# Patient Record
Sex: FEMALE | Race: WHITE | NOT HISPANIC OR LATINO | Employment: OTHER | URBAN - METROPOLITAN AREA
[De-identification: names, ages, dates, MRNs, and addresses within clinical notes are randomized per-mention and may not be internally consistent; named-entity substitution may affect disease eponyms.]

---

## 2017-01-05 ENCOUNTER — OFFICE VISIT (OUTPATIENT)
Dept: PODIATRY | Facility: CLINIC | Age: 82
End: 2017-01-05
Payer: COMMERCIAL

## 2017-01-05 DIAGNOSIS — L60.2 ONYCHAUXIS: Primary | ICD-10-CM

## 2017-01-05 PROCEDURE — 17999 UNLISTD PX SKN MUC MEMB SUBQ: CPT | Mod: CSM,,,

## 2017-01-05 PROCEDURE — 99499 UNLISTED E&M SERVICE: CPT | Mod: CSM,,,

## 2017-01-05 PROCEDURE — 99999 PR PBB SHADOW E&M-EST. PATIENT-LVL II: CPT | Mod: PBBFAC,,,

## 2017-01-05 NOTE — MR AVS SNAPSHOT
[facility]  [address] [facility]  [facility] [address]  Phone: [phone]                  Judy Thompson   2017 11:00 AM   Office Visit    Description:  Female : 1933   Provider:  Braeden Vazquez DPM   [facility]           Reason for Visit     Routine Foot Care                To Do List           Future Appointments        Provider Department Dept Phone    2017 10:00 AM DIANA Quinn - Outpatient Rehab [phone]    2017 11:00 AM LAB, [place] [facility] [phone]    2017 1:40 PM Kristopher Santizo MD [facility] Hematology [phone]    2017 2:00 PM [address]AIR [address] [facility] CHE[facility] [phone]    3/9/2017 11:15 AM [facility] [phone]      Goals (5 Years of Data)     None      [facility]     [facility] Nurse Care Line -  Assistance  Registered nurses in the [facility] provide clinical advisement, health education, appointment booking, and other advisory services.  Call for this free service at [phone].             Medications           Message regarding Medications     Verify the changes and/or additions to your medication regime listed below are the same as discussed with your clinician today.  If any of these changes or additions are incorrect, please notify your healthcare provider.             Verify that the below list of medications is an accurate representation of the medications you are currently taking.  If none reported, the list may be blank. If incorrect, please contact your healthcare provider. Carry this list with you in case of emergency.           Current Medications     ASCORBATE CALCIUM (VITAMIN C ORAL) Take 1 tablet by mouth.    aspirin 81 mg Tab Take 1 tablet by mouth Daily.    benazepril (LOTENSIN) 40 MG tablet TAKE 1 TABLET BY MOUTH EVERY DAY    calcium carbonate-vit D3-min (CALCIUM 600 +  MINERALS) 600 mg calcium- 400 unit Tab Take 1 tablet by mouth once daily.    cholecalciferol, vitamin D3, (VITAMIN D3) 400 unit Chew Take by mouth.    DENOSUMAB (PROLIA SUBQ) Inject into the skin. Every 6 months    diazePAM (VALIUM) 5 MG tablet Take 1 tablet (5 mg total) by mouth 2 (two) times daily as needed.    DUEXIS 800-26.6 mg Tab Take 800 mg by mouth 3 (three) times daily.     FSH/FLX/PRM/BLK/BOR/OM3,6,9 #5 (OMEGA 3-6-9 FATTY ACIDS ORAL) Take 1 capsule by mouth.    glucosamine-chondroitin 500-400 mg tablet Take 1 tablet by mouth once daily.    multivitamin (THERAGRAN) per tablet Take 1 tablet by mouth Daily.    tavaborole (KERYDIN) 5 % Karuna Apply 1 application topically once daily.           Clinical Reference Information           Allergies as of 1/5/2017     Codeine      Immunizations Administered on Date of Encounter - 1/5/2017     None

## 2017-01-07 NOTE — PROGRESS NOTES
Pt presents for routine non-covered foot care. Pt. does not have high risk feet. Pedal pulses are palpable. Nails are elongated, thickened Bilaterally. Diagnosis is onychauxis. Nails were reduced Bilaterally. Patient tolerated well and related relief. RTC p.r.n. as PROC B

## 2017-01-10 RX ORDER — BENAZEPRIL HYDROCHLORIDE 40 MG/1
TABLET ORAL
Qty: 90 TABLET | Refills: 0 | Status: SHIPPED | OUTPATIENT
Start: 2017-01-10 | End: 2017-04-11 | Stop reason: SDUPTHER

## 2017-01-13 ENCOUNTER — CLINICAL SUPPORT (OUTPATIENT)
Dept: REHABILITATION | Facility: HOSPITAL | Age: 82
End: 2017-01-13
Attending: FAMILY MEDICINE
Payer: COMMERCIAL

## 2017-01-13 DIAGNOSIS — M81.0 OSTEOPOROSIS: ICD-10-CM

## 2017-01-13 DIAGNOSIS — Z91.81 RISK FOR FALLS: ICD-10-CM

## 2017-01-13 PROCEDURE — G8979 MOBILITY GOAL STATUS: HCPCS | Mod: CJ,PN

## 2017-01-13 PROCEDURE — G8978 MOBILITY CURRENT STATUS: HCPCS | Mod: CJ,PN

## 2017-01-13 PROCEDURE — 97112 NEUROMUSCULAR REEDUCATION: CPT | Mod: PN

## 2017-01-13 PROCEDURE — 97110 THERAPEUTIC EXERCISES: CPT | Mod: PN

## 2017-01-13 PROCEDURE — 97116 GAIT TRAINING THERAPY: CPT | Mod: PN
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
## 2017-01-18 ENCOUNTER — TELEPHONE (OUTPATIENT)
Dept: HEMATOLOGY/ONCOLOGY | Facility: CLINIC | Age: 82
End: 2017-01-18

## 2017-01-18 ENCOUNTER — CLINICAL SUPPORT (OUTPATIENT)
Dept: REHABILITATION | Facility: HOSPITAL | Age: 82
End: 2017-01-18
Attending: FAMILY MEDICINE
Payer: COMMERCIAL

## 2017-01-18 DIAGNOSIS — Z91.81 RISK FOR FALLS: ICD-10-CM

## 2017-01-18 DIAGNOSIS — M81.0 OSTEOPOROSIS: ICD-10-CM

## 2017-01-18 PROCEDURE — 97116 GAIT TRAINING THERAPY: CPT | Mod: PN

## 2017-01-18 PROCEDURE — 97112 NEUROMUSCULAR REEDUCATION: CPT | Mod: PN

## 2017-01-18 PROCEDURE — 97110 THERAPEUTIC EXERCISES: CPT | Mod: PN

## 2017-01-18 NOTE — TELEPHONE ENCOUNTER
----- Message from Julee Cordoba sent at 1/18/2017  2:51 PM CST -----  Contact: self: [phone]  Patient has a blood test on 1/20/17. She would like to change it to 1/21/17. Please call to reschedule.
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
## 2017-01-20 ENCOUNTER — TELEPHONE (OUTPATIENT)
Dept: HEMATOLOGY/ONCOLOGY | Facility: CLINIC | Age: 82
End: 2017-01-20

## 2017-01-20 NOTE — TELEPHONE ENCOUNTER
----- Message from Evon Esposito sent at 1/19/2017  2:42 PM CST -----  Pt called stated that she's been waiting on a call back regarding changing her lab appt to Saturday/pls call back at [phone]

## 2017-01-21 ENCOUNTER — LAB VISIT (OUTPATIENT)
Dept: LAB | Facility: HOSPITAL | Age: 82
End: 2017-01-21
Attending: INTERNAL MEDICINE
Payer: COMMERCIAL

## 2017-01-21 DIAGNOSIS — C50.911 BREAST CANCER, RIGHT: ICD-10-CM

## 2017-01-21 DIAGNOSIS — S72.001A HIP FRACTURE, RIGHT, CLOSED, INITIAL ENCOUNTER: ICD-10-CM

## 2017-01-21 DIAGNOSIS — M81.0 OSTEOPOROSIS, SENILE: ICD-10-CM

## 2017-01-21 LAB
ANION GAP SERPL CALC-SCNC: 13 MMOL/L
BUN SERPL-MCNC: 16 MG/DL
CALCIUM SERPL-MCNC: 9.4 MG/DL
CHLORIDE SERPL-SCNC: 101 MMOL/L
CO2 SERPL-SCNC: 23 MMOL/L
CREAT SERPL-MCNC: 0.7 MG/DL
EST. GFR  (AFRICAN AMERICAN): >60 ML/MIN/1.73 M^2
EST. GFR  (NON AFRICAN AMERICAN): >60 ML/MIN/1.73 M^2
GLUCOSE SERPL-MCNC: 97 MG/DL
POTASSIUM SERPL-SCNC: 4.6 MMOL/L
SODIUM SERPL-SCNC: 137 MMOL/L

## 2017-01-21 PROCEDURE — 80048 BASIC METABOLIC PNL TOTAL CA: CPT | Mod: PO

## 2017-01-21 PROCEDURE — 36415 COLL VENOUS BLD VENIPUNCTURE: CPT | Mod: PO

## 2017-01-23 ENCOUNTER — OFFICE VISIT (OUTPATIENT)
Dept: HEMATOLOGY/ONCOLOGY | Facility: CLINIC | Age: 82
End: 2017-01-23
Payer: COMMERCIAL

## 2017-01-23 ENCOUNTER — INFUSION (OUTPATIENT)
Dept: INFUSION THERAPY | Facility: HOSPITAL | Age: 82
End: 2017-01-23
Attending: INTERNAL MEDICINE
Payer: COMMERCIAL

## 2017-01-23 VITALS
WEIGHT: 138.69 LBS | DIASTOLIC BLOOD PRESSURE: 74 MMHG | HEART RATE: 61 BPM | SYSTOLIC BLOOD PRESSURE: 173 MMHG | HEIGHT: 63 IN | RESPIRATION RATE: 19 BRPM | BODY MASS INDEX: 24.57 KG/M2

## 2017-01-23 DIAGNOSIS — S72.001A HIP FRACTURE, RIGHT, CLOSED, INITIAL ENCOUNTER: ICD-10-CM

## 2017-01-23 DIAGNOSIS — C50.211 MALIGNANT NEOPLASM OF UPPER-INNER QUADRANT OF RIGHT FEMALE BREAST: ICD-10-CM

## 2017-01-23 DIAGNOSIS — M81.0 OSTEOPOROSIS: ICD-10-CM

## 2017-01-23 DIAGNOSIS — M81.0 OSTEOPOROSIS: Primary | ICD-10-CM

## 2017-01-23 DIAGNOSIS — M81.0 OSTEOPOROSIS, SENILE: Primary | ICD-10-CM

## 2017-01-23 PROCEDURE — 1157F ADVNC CARE PLAN IN RCRD: CPT | Mod: S$GLB,,, | Performed by: INTERNAL MEDICINE

## 2017-01-23 PROCEDURE — 1160F RVW MEDS BY RX/DR IN RCRD: CPT | Mod: S$GLB,,, | Performed by: INTERNAL MEDICINE

## 2017-01-23 PROCEDURE — 96372 THER/PROPH/DIAG INJ SC/IM: CPT | Mod: PN

## 2017-01-23 PROCEDURE — 99214 OFFICE O/P EST MOD 30 MIN: CPT | Mod: S$GLB,,, | Performed by: INTERNAL MEDICINE

## 2017-01-23 PROCEDURE — 1159F MED LIST DOCD IN RCRD: CPT | Mod: S$GLB,,, | Performed by: INTERNAL MEDICINE

## 2017-01-23 PROCEDURE — 63600175 PHARM REV CODE 636 W HCPCS: Mod: PN | Performed by: INTERNAL MEDICINE

## 2017-01-23 PROCEDURE — 3078F DIAST BP <80 MM HG: CPT | Mod: S$GLB,,, | Performed by: INTERNAL MEDICINE

## 2017-01-23 PROCEDURE — 1125F AMNT PAIN NOTED PAIN PRSNT: CPT | Mod: S$GLB,,, | Performed by: INTERNAL MEDICINE

## 2017-01-23 PROCEDURE — 99999 PR PBB SHADOW E&M-EST. PATIENT-LVL III: CPT | Mod: PBBFAC,,, | Performed by: INTERNAL MEDICINE

## 2017-01-23 PROCEDURE — 3077F SYST BP >= 140 MM HG: CPT | Mod: S$GLB,,, | Performed by: INTERNAL MEDICINE

## 2017-01-23 RX ADMIN — DENOSUMAB 60 MG: 60 INJECTION SUBCUTANEOUS at 02:01
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
## 2017-01-24 ENCOUNTER — CLINICAL SUPPORT (OUTPATIENT)
Dept: REHABILITATION | Facility: HOSPITAL | Age: 82
End: 2017-01-24
Attending: FAMILY MEDICINE
Payer: COMMERCIAL

## 2017-01-24 DIAGNOSIS — M81.0 OSTEOPOROSIS: ICD-10-CM

## 2017-01-24 DIAGNOSIS — Z91.81 RISK FOR FALLS: ICD-10-CM

## 2017-01-24 PROCEDURE — 97112 NEUROMUSCULAR REEDUCATION: CPT | Mod: PN

## 2017-01-24 PROCEDURE — 97110 THERAPEUTIC EXERCISES: CPT | Mod: PN

## 2017-01-24 PROCEDURE — 97116 GAIT TRAINING THERAPY: CPT | Mod: PN
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
## 2017-01-26 ENCOUNTER — CLINICAL SUPPORT (OUTPATIENT)
Dept: REHABILITATION | Facility: HOSPITAL | Age: 82
End: 2017-01-26
Attending: FAMILY MEDICINE
Payer: COMMERCIAL

## 2017-01-26 DIAGNOSIS — M81.0 OSTEOPOROSIS: ICD-10-CM

## 2017-01-26 DIAGNOSIS — Z91.81 RISK FOR FALLS: ICD-10-CM

## 2017-01-26 PROCEDURE — 97110 THERAPEUTIC EXERCISES: CPT | Mod: PN

## 2017-01-26 PROCEDURE — 97112 NEUROMUSCULAR REEDUCATION: CPT | Mod: PN

## 2017-01-26 PROCEDURE — 97116 GAIT TRAINING THERAPY: CPT | Mod: PN

## 2017-01-26 NOTE — PROGRESS NOTES
"Name: Judy Thompson  Clinic Number: [id number]  Date of Treatment: 01/26/2017   Diagnosis:   Encounter Diagnoses   Name Primary?    Risk for falls     Osteoporosis        Visit number: 10  Start date: 12/8/16  POC End date: 2/2/17    Time in: 10:58  Time out: 11:50  Tx time: 50 min     Precautions: HTN, decreased balance, R ANKIT in 2015 in [place]    Subjective:    Patient reports improvement of pain in her R low back since falling onto the step. Rates her R knee pain to be 3/10 on a 0-10 scale with 0 being no pain and 10 being the worst pain imaginable.     Objective     Judy instructed in and performed therapeutic exercises to develop strength, endurance, flexibility, posture and core stabilization for 20 minutes including:   Rec bike (seat 4) level 2 x5 min  Seated HS stretch 2 x 60" each  Calf stretch on slant board x 2'   Calf raises 3 x 10   Standing hip abd 2 x 10 B   Sit to stand with BIG movement x 10   Up and down steps x 2    Neuromuscular Re-education for improved balance, coordination of LEs, postural stability with narrowed DEEDEE  X 15 min   Standing balance: SLS x 10" x 3 trials each with UE support, modified tandem stance without UE assist 3x10 sec  PNF lifts with Red tball from mat to shelf x 10 each   1/4 turns over objects x 10 B directions with UE support as needed.     Gait tx for improved stability with direction changes and obstacle negotiations: X 15 min   Dynamic gait activities: head turns, obstacle negotiations (especially step overs with minimal UE assist), direction changes, speed changes in and out of parallel bars with varying assist from UEs     Written Home Exercises Provided: to continue with current written HEP.  Pt demonstrated good understanding of education provided with good return demonstration of exercises.    Assessment:     Judy tolerated treatment session well this date. She continues with hesitation to perform step over activities without use of UEs due to fear of falling. " Improved tolerance to sit-stand with BIG movements however remains limited due to R knee pain. Improved foot clearance with cone step overs. Required several verbal cues for increased step height with several activities. She required UE use during gait and functional activities due to mild balance deficits and LE weakness.   Pt will continue to benefit from skilled PT intervention. Medical Necessity is demonstrated by:  Fall Risk, Pain limits function of effected part for some activities, Unable to participate fully in daily activities, Requires skilled supervision to complete and progress HEP and Weakness.    Patient is making good progress towards established goals.    GOALS:   Short term goals: 4 weeks, pt agrees to goals set.  1. Increased Read score by 2-3 points for increased safety and balance for daily activities  2. Pt will ambulate on uneven surface with or without SC Mod I  3. Pt will ascend/descend 20 steps with 1 rail mod I  4. Pt will tolerate 45 minutes of physical therapy treatment with 1 rest break to demonstrate overall improvement in CV endurance  5. Pt will increase B LE strength by 1/3 muscle grade in all deficient planes for increased ease with stairs.     Long term goals: 8 weeks, pt agrees to goals set  1. Pt will ambulate 15-20 ft on grass with/without SC mod I  2. Pt will be independent with HEP.  3. Pt will increase B LE strength by 1/3 muscle grade in all deficient planes for increased ease with stairs.  4. Pt will be able to perform > or = 10 chair rises without UE support in order to demonstrate improved LE strength and endurance.    Plan:  Continue with established Plan of Care towards PT goals. Continue to progress balance challenges and gait train on unlevel surfaces.     Safia Rodriguez

## 2017-02-01 ENCOUNTER — CLINICAL SUPPORT (OUTPATIENT)
Dept: REHABILITATION | Facility: HOSPITAL | Age: 82
End: 2017-02-01
Attending: FAMILY MEDICINE
Payer: COMMERCIAL

## 2017-02-01 DIAGNOSIS — Z91.81 RISK FOR FALLS: Primary | ICD-10-CM

## 2017-02-01 DIAGNOSIS — M81.0 OSTEOPOROSIS: ICD-10-CM

## 2017-02-01 PROCEDURE — G8979 MOBILITY GOAL STATUS: HCPCS | Mod: CJ,PN

## 2017-02-01 PROCEDURE — G8978 MOBILITY CURRENT STATUS: HCPCS | Mod: CJ,PN

## 2017-02-01 PROCEDURE — 97112 NEUROMUSCULAR REEDUCATION: CPT | Mod: PN

## 2017-02-01 PROCEDURE — 97110 THERAPEUTIC EXERCISES: CPT | Mod: PN

## 2017-02-01 PROCEDURE — 97116 GAIT TRAINING THERAPY: CPT | Mod: PN

## 2017-02-01 NOTE — PLAN OF CARE
Name: Judy Thompson  Clinic Number: [id number]  Date of Treatment: 02/01/2017   Diagnosis:   Encounter Diagnoses   Name Primary?    Risk for falls Yes    Osteoporosis        Visit number: 11  Start date: 12/8/16  POC End date: 2/2/17  Updated Plan of Care: 2/2/17     Precautions: HTN, decreased balance, R ANKIT in 2015 in [place]    Subjective:    Patient reports improvement of pain in her R low back since falling onto the step. Rates her R knee pain to be 3/10 on a 0-10 scale with 0 being no pain and 10 being the worst pain imaginable.     Objective     Hip abduction 4-/5  lena     Name: Judy Thompson, Clinic# [id number], 1/13/17      1. Gait level surfaces:  3 Normal Walks 20' w/no A.D., good speed, no evidence for imbalance,normal gait pattern   2 Mild Impairment Walks 20', uses A.D., slower speed, mild gait deviation   1 Moderate Impairment Walks 20', slow speed, abdnormal gait pattern, evidence of imbalance   0 Severe Impairment Cannot walk 20' without A.D., severe gait deviations or imbalance       Score:_2_____      2. Change in Gait Speed:  3 Normal Able to smoothly change walking speed without loss of balance or gait deviation. Shows a significant difference in walking speeds between normal, fast and slow speeds.   2 Mild Impairment Is able to change speed but demonstrates mild gait deviations or no gait deviations but unable to achieve a significant change in velocity or uses and A.D.   1 Moderate Impairment Makes only minor adjustments in walking speed or accomplishes a change in speed with significant gait deviations or changes speed but loses significant gait deviations or changes speed but loses balance but is able to recover and continue walking   0 Severe Impairment Cannot change speeds or loses balance and has to reach for walls or be caught       Score:_2____      3: Gait with Horizontal Head Turns:  3 Normal Performs Head turns smoothly with no change in gait.   2 Mild Impairment Performs head turns  smoothly with slight change in gait velocity, i.e minor disruptions to smooth gait path or uses a walking aid.    1 Moderate Impairment Performs head turns with moderate changes in gait velocity, slows down, staggers but recovers, can continue to walk.   0 Severe Impairment Performs task with severe disruption of gait , ie. Staggers outside 15 degree path, loses balance, stops reaches for wall.        Score:_3____      4. Gait with vertical Head turns:   3 Normal  Performs head turns with no change in gait   2 Mild impairment Performs task with slight change in gait velocity ie. Minor disruption in smooth gait path or uses walking aid.   1  Moderate impairment Performs task with moderate change in gait velocity, slows down, staggers but recovers, can continue to walk   0 Severe impairment Performs task with severe disruption of gait ie.staggers outside 15 degree path loses balance, stops reaches for wall       Score:__3_____          5. Gait and pivot turn:   3 Normal Pivot turns safely within 3 seconds and stops quickly with no loss of balance.   2 Mild Impairment Pivot turns safely in >3 seconds and stops quickly with no loss of balance.   1 Moderate Impairment Turns slowly, requires verbal cuing, requires several small steps to catch balance following turn and stop.   0 Severe Impairment Cannot turn safely, requires assistance to turn and stop.       Score:_2_____      6. Step over Obstacle:  3 Normal Is able to step over box without changing gait speed, no evidence of imbalance   2  Mild Impairment Is able to step over box but must slow down and adjust steps to clear box safely   1 Moderate Impairment Is able to step over box but must stop, then step over. May require verbal cueing.   0 Severe Impairment Unable to clear objects, walks into one or both, or requires physical assistance.       Score: _2______      7. Step Around Obstacles:   3  Normal Is able to walk around objects safely without changing gait  "speed, no evidence of imbalance   2 Mild impairment Is able to step around objects but must slow down and adjust steps to clear cones.   1 Moderate Impairment Is able to clear objects but must significantly slow speed to accomplish task or requires verbal cues.   0 Severe Impairment Unable to clear objects, walks into one or both or requires physical assistance.       Score:__2_____      8. Steps:   3 Normal Alternating feet, no rail   2 Mild Impairment Alternating feet, must use rail   1 Moderate Impiarment Two feet to a stair, must use rail   0 Severe impairment Cannot do safely.       Score:__2_____          TOTAL SCORE:_18/24__(20<40% impaired)_____  Goal: 19/24 (20<40% impaired)      Scoring/Attaching Appropriate Modifiers      Test score Modifier Impairment Limitation Restriction   24/24 CH 0 percent impaired, limited or restricted   20-23/24 CI @ least 1% but <20% impaired limited or restricted   15-19/24 CJ @ least 20% < 40% impaired, limited or restricted   10-14/24 CK @ least 40% < 60% impaired, limited or restricted   5-9/24 CL @ least 60% < 80% impaired, limited or restricted   1-4/24 CM @ least 80% < 100% impaired, limited or restricted   0/24 % impaired, limited or restricted              Judy instructed in and performed therapeutic exercises to develop strength, endurance, flexibility, posture and core stabilization for 30 minutes including:   Rec bike (seat 4) level 2 x5 min  Seated HS stretch 2 x 60" each  Calf stretch on slant board x 2'   Calf raises 3 x 10   Standing hip abd 2 x 10 B x10 with 2#   Up and down steps x 2    Neuromuscular Re-education for improved balance, coordination of LEs, postural stability with narrowed DEEDEE  X 15 min   Standing balance: SLS x 10" x 3 trials each with UE support, modified tandem stance without UE assist 3x10 sec  PNF lifts with Red tball from mat to shelf x 10 each   1/4 turns over objects x 10 B directions with UE support as needed.     Gait tx for " improved stability with direction changes and obstacle negotiations: X 15 min   Dynamic gait activities: head turns, obstacle negotiations (especially step overs with minimal UE assist), direction changes, speed changes in and out of parallel bars with varying assist from UEs     Written Home Exercises Provided: to continue with current written HEP.  Pt demonstrated good understanding of education provided with good return demonstration of exercises.    Assessment:     Judy tolerated treatment session well this date. She continues with hesitation to perform step over activities without use of UEs due to fear of falling. Improved tolerance to sit-stand with BIG movements however remains limited due to R knee pain. Improved foot clearance with cone step overs. Required several verbal cues for increased step height with several activities. She required UE use during gait and functional activities due to mild balance deficits and LE weakness.   Pt will continue to benefit from skilled PT intervention. Medical Necessity is demonstrated by:  Fall Risk, Pain limits function of effected part for some activities, Unable to participate fully in daily activities, Requires skilled supervision to complete and progress HEP and Weakness.    Patient is making good progress towards established goals.    GOALS:   Short term goals: 4 weeks, pt agrees to goals set.  1. Increased Read score by 2-3 points for increased safety and balance for daily activities  2. Pt will ambulate on uneven surface with or without SC Mod I  3. Pt will ascend/descend 20 steps with 1 rail mod I ~MET  4. Pt will tolerate 45 minutes of physical therapy treatment with 1 rest break to demonstrate overall improvement in CV endurance  5. Pt will increase B LE strength by 1/3 muscle grade in all deficient planes for increased ease with stairs.     Long term goals: 8 weeks, pt agrees to goals set  1. Pt will ambulate 15-20 ft on grass with/without SC mod I ~SBA on  2/1/17  2. Pt will be independent with HEP. ~progessing 2/1/17   3. Pt will increase B LE strength by 1/3 muscle grade in all deficient planes for increased ease with stairs.  4. Pt will be able to perform > or = 10 chair rises without UE support in order to demonstrate improved LE strength and endurance.  5. Dynamic Gait index > 20/34 for decreased fall risk     Plan:  Update PT plan of care 2 x week for 4 weeks as of 2/2/17 progressing towards current goals       Eladio Sánchez        I certify the need for these services furnished under this plan of treatment and while under my care.  ____________________________________ Physician/Referring Practitioner   Date of Signature

## 2017-02-01 NOTE — PROGRESS NOTES
Name: Judy Thompson  Clinic Number: [id number]  Date of Treatment: 02/01/2017   Diagnosis:   Encounter Diagnoses   Name Primary?    Risk for falls Yes    Osteoporosis        Visit number: 11  Start date: 12/8/16  POC End date: 2/2/17  Updated Plan of Care: 2/2/17     Precautions: HTN, decreased balance, R ANKIT in 2015 in [place]    Subjective:    Patient reports improvement of pain in her R low back since falling onto the step. Rates her R knee pain to be 3/10 on a 0-10 scale with 0 being no pain and 10 being the worst pain imaginable.     Objective     Hip abduction 4-/5  lena     Name: Judy Thompson, Clinic# [id number], 1/13/17      1. Gait level surfaces:  3 Normal Walks 20' w/no A.D., good speed, no evidence for imbalance,normal gait pattern   2 Mild Impairment Walks 20', uses A.D., slower speed, mild gait deviation   1 Moderate Impairment Walks 20', slow speed, abdnormal gait pattern, evidence of imbalance   0 Severe Impairment Cannot walk 20' without A.D., severe gait deviations or imbalance       Score:_2_____      2. Change in Gait Speed:  3 Normal Able to smoothly change walking speed without loss of balance or gait deviation. Shows a significant difference in walking speeds between normal, fast and slow speeds.   2 Mild Impairment Is able to change speed but demonstrates mild gait deviations or no gait deviations but unable to achieve a significant change in velocity or uses and A.D.   1 Moderate Impairment Makes only minor adjustments in walking speed or accomplishes a change in speed with significant gait deviations or changes speed but loses significant gait deviations or changes speed but loses balance but is able to recover and continue walking   0 Severe Impairment Cannot change speeds or loses balance and has to reach for walls or be caught       Score:_2____      3: Gait with Horizontal Head Turns:  3 Normal Performs Head turns smoothly with no change in gait.   2 Mild Impairment Performs head turns  smoothly with slight change in gait velocity, i.e minor disruptions to smooth gait path or uses a walking aid.    1 Moderate Impairment Performs head turns with moderate changes in gait velocity, slows down, staggers but recovers, can continue to walk.   0 Severe Impairment Performs task with severe disruption of gait , ie. Staggers outside 15 degree path, loses balance, stops reaches for wall.        Score:_3____      4. Gait with vertical Head turns:   3 Normal  Performs head turns with no change in gait   2 Mild impairment Performs task with slight change in gait velocity ie. Minor disruption in smooth gait path or uses walking aid.   1  Moderate impairment Performs task with moderate change in gait velocity, slows down, staggers but recovers, can continue to walk   0 Severe impairment Performs task with severe disruption of gait ie.staggers outside 15 degree path loses balance, stops reaches for wall       Score:__3_____          5. Gait and pivot turn:   3 Normal Pivot turns safely within 3 seconds and stops quickly with no loss of balance.   2 Mild Impairment Pivot turns safely in >3 seconds and stops quickly with no loss of balance.   1 Moderate Impairment Turns slowly, requires verbal cuing, requires several small steps to catch balance following turn and stop.   0 Severe Impairment Cannot turn safely, requires assistance to turn and stop.       Score:_2_____      6. Step over Obstacle:  3 Normal Is able to step over box without changing gait speed, no evidence of imbalance   2  Mild Impairment Is able to step over box but must slow down and adjust steps to clear box safely   1 Moderate Impairment Is able to step over box but must stop, then step over. May require verbal cueing.   0 Severe Impairment Unable to clear objects, walks into one or both, or requires physical assistance.       Score: _2______      7. Step Around Obstacles:   3  Normal Is able to walk around objects safely without changing gait  "speed, no evidence of imbalance   2 Mild impairment Is able to step around objects but must slow down and adjust steps to clear cones.   1 Moderate Impairment Is able to clear objects but must significantly slow speed to accomplish task or requires verbal cues.   0 Severe Impairment Unable to clear objects, walks into one or both or requires physical assistance.       Score:__2_____      8. Steps:   3 Normal Alternating feet, no rail   2 Mild Impairment Alternating feet, must use rail   1 Moderate Impiarment Two feet to a stair, must use rail   0 Severe impairment Cannot do safely.       Score:__2_____          TOTAL SCORE:_18/24__(20<40% impaired)_____  Goal: 19/24 (20<40% impaired)      Scoring/Attaching Appropriate Modifiers      Test score Modifier Impairment Limitation Restriction   24/24 CH 0 percent impaired, limited or restricted   20-23/24 CI @ least 1% but <20% impaired limited or restricted   15-19/24 CJ @ least 20% < 40% impaired, limited or restricted   10-14/24 CK @ least 40% < 60% impaired, limited or restricted   5-9/24 CL @ least 60% < 80% impaired, limited or restricted   1-4/24 CM @ least 80% < 100% impaired, limited or restricted   0/24 % impaired, limited or restricted              Judy instructed in and performed therapeutic exercises to develop strength, endurance, flexibility, posture and core stabilization for 30 minutes including:   Rec bike (seat 4) level 2 x5 min  Seated HS stretch 2 x 60" each  Calf stretch on slant board x 2'   Calf raises 3 x 10   Standing hip abd 2 x 10 B x10 with 2#   Up and down steps x 2    Neuromuscular Re-education for improved balance, coordination of LEs, postural stability with narrowed DEEDEE  X 15 min   Standing balance: SLS x 10" x 3 trials each with UE support, modified tandem stance without UE assist 3x10 sec  PNF lifts with Red tball from mat to shelf x 10 each   1/4 turns over objects x 10 B directions with UE support as needed.     Gait tx for " improved stability with direction changes and obstacle negotiations: X 15 min   Dynamic gait activities: head turns, obstacle negotiations (especially step overs with minimal UE assist), direction changes, speed changes in and out of parallel bars with varying assist from UEs     Written Home Exercises Provided: to continue with current written HEP.  Pt demonstrated good understanding of education provided with good return demonstration of exercises.    Assessment:     Judy tolerated treatment session well this date. She continues with hesitation to perform step over activities without use of UEs due to fear of falling. Improved tolerance to sit-stand with BIG movements however remains limited due to R knee pain. Improved foot clearance with cone step overs. Required several verbal cues for increased step height with several activities. She required UE use during gait and functional activities due to mild balance deficits and LE weakness.   Pt will continue to benefit from skilled PT intervention. Medical Necessity is demonstrated by:  Fall Risk, Pain limits function of effected part for some activities, Unable to participate fully in daily activities, Requires skilled supervision to complete and progress HEP and Weakness.    Patient is making good progress towards established goals.    GOALS:   Short term goals: 4 weeks, pt agrees to goals set.  1. Increased Read score by 2-3 points for increased safety and balance for daily activities  2. Pt will ambulate on uneven surface with or without SC Mod I  3. Pt will ascend/descend 20 steps with 1 rail mod I ~MET  4. Pt will tolerate 45 minutes of physical therapy treatment with 1 rest break to demonstrate overall improvement in CV endurance  5. Pt will increase B LE strength by 1/3 muscle grade in all deficient planes for increased ease with stairs.     Long term goals: 8 weeks, pt agrees to goals set  1. Pt will ambulate 15-20 ft on grass with/without SC mod I ~SBA on  2/1/17  2. Pt will be independent with HEP. ~progessing 2/1/17   3. Pt will increase B LE strength by 1/3 muscle grade in all deficient planes for increased ease with stairs.  4. Pt will be able to perform > or = 10 chair rises without UE support in order to demonstrate improved LE strength and endurance.  5. Dynamic Gait index > 20/34 for decreased fall risk     Plan:  Update PT plan of care 2 x week for 4 weeks as of 2/2/17 progressing towards current goals       Eladio Sánchez        I certify the need for these services furnished under this plan of treatment and while under my care.  ____________________________________ Physician/Referring Practitioner   Date of Signature

## 2017-02-03 ENCOUNTER — CLINICAL SUPPORT (OUTPATIENT)
Dept: REHABILITATION | Facility: HOSPITAL | Age: 82
End: 2017-02-03
Attending: FAMILY MEDICINE
Payer: COMMERCIAL

## 2017-02-03 DIAGNOSIS — Z91.81 RISK FOR FALLS: ICD-10-CM

## 2017-02-03 DIAGNOSIS — M81.0 OSTEOPOROSIS: ICD-10-CM

## 2017-02-03 PROCEDURE — 97116 GAIT TRAINING THERAPY: CPT | Mod: PN

## 2017-02-03 PROCEDURE — 97110 THERAPEUTIC EXERCISES: CPT | Mod: PN

## 2017-02-03 NOTE — PROGRESS NOTES
"Name: Judy Thompson  Clinic Number: [id number]  Date of Treatment: 02/03/2017   Diagnosis:   Encounter Diagnoses   Name Primary?    Risk for falls     Osteoporosis        Visit number: 12  Start date: 12/8/16  POC End date: 2/2/17  Updated Plan of Care: 2/2/17     Start time: 11:07  End time: 12:03  Total Treatment Time: 55  Group time: 25    Precautions: HTN, decreased balance, R ANKIT in 2015 in [place]    Subjective:    Patient reports improvement of pain in her R low back since falling onto the step. Rates her R knee pain to be 3/10 on a 0-10 scale with 0 being no pain and 10 being the worst pain imaginable.     Objective     Judy instructed in and performed therapeutic exercises to develop strength, endurance, flexibility, posture and core stabilization for 30 minutes including:   Rec bike (seat 4) level 2 x5 min  Seated HS stretch 2 x 60" each  Calf stretch on slant board x 2'   Calf raises 3 x 10   Standing hip abd 2 x 10 B x10 with 2#   Up and down steps x 2 (NP)    Neuromuscular Re-education for improved balance, coordination of LEs, postural stability with narrowed DEEDEE  X 5 min   Standing balance: SLS x 10" x 3 trials each with UE support, modified tandem stance without UE assist 3x10 sec  PNF lifts with Red tball from mat to shelf x 10 each (NP)  1/4 turns over objects x 10 B directions with UE support as needed. (NP)    Gait tx for improved stability with direction changes and obstacle negotiations: X 20 min   Dynamic gait activities: head turns, obstacle negotiations (especially step overs with minimal UE assist), direction changes, speed changes in and out of parallel bars with varying assist from UEs     Written Home Exercises Provided: to continue with current written HEP.  Pt demonstrated good understanding of education provided with good return demonstration of exercises.    Assessment:     Improved step clearance during ambulation with decreased use of UE for gait activities in // bars. Judy tolerated " treatment session well overall yet reported mild increase of R knee fatigue at end of session. She continues with mild hesitation to avoid use of UEs due to fear of falling. Tandem balance worse with L LE in front.   Pt will continue to benefit from skilled PT intervention. Medical Necessity is demonstrated by:  Fall Risk, Pain limits function of effected part for some activities, Unable to participate fully in daily activities, Requires skilled supervision to complete and progress HEP and Weakness.    Patient is making good progress towards established goals.    GOALS:   Short term goals: 4 weeks, pt agrees to goals set.  1. Increased Reda score by 2-3 points for increased safety and balance for daily activities  2. Pt will ambulate on uneven surface with or without SC Mod I  3. Pt will ascend/descend 20 steps with 1 rail mod I ~MET  4. Pt will tolerate 45 minutes of physical therapy treatment with 1 rest break to demonstrate overall improvement in CV endurance  5. Pt will increase B LE strength by 1/3 muscle grade in all deficient planes for increased ease with stairs.     Long term goals: 8 weeks, pt agrees to goals set  1. Pt will ambulate 15-20 ft on grass with/without SC mod I ~SBA on 2/1/17  2. Pt will be independent with HEP. ~progessing 2/1/17   3. Pt will increase B LE strength by 1/3 muscle grade in all deficient planes for increased ease with stairs.  4. Pt will be able to perform > or = 10 chair rises without UE support in order to demonstrate improved LE strength and endurance.  5. Dynamic Gait index > 20/34 for decreased fall risk     Plan:  Update PT plan of care 2 x week for 4 weeks as of 2/2/17 progressing towards current goals       Safia Rodriguez

## 2017-02-07 ENCOUNTER — CLINICAL SUPPORT (OUTPATIENT)
Dept: REHABILITATION | Facility: HOSPITAL | Age: 82
End: 2017-02-07
Attending: FAMILY MEDICINE
Payer: COMMERCIAL

## 2017-02-07 DIAGNOSIS — Z91.81 RISK FOR FALLS: ICD-10-CM

## 2017-02-07 DIAGNOSIS — M81.0 OSTEOPOROSIS: ICD-10-CM

## 2017-02-07 PROCEDURE — 97110 THERAPEUTIC EXERCISES: CPT | Mod: PN

## 2017-02-07 PROCEDURE — 97112 NEUROMUSCULAR REEDUCATION: CPT | Mod: PN

## 2017-02-07 PROCEDURE — 97116 GAIT TRAINING THERAPY: CPT | Mod: PN

## 2017-02-07 NOTE — PROGRESS NOTES
"Name: Judy Thompson  Clinic Number: [id number]  Date of Treatment: 02/07/2017   Diagnosis:   Encounter Diagnoses   Name Primary?    Risk for falls     Osteoporosis        Visit number: 12  Start date: 12/8/16  POC End date: 2/2/17  Updated Plan of Care: 2/2/17     Start time: 12:03  End time: 12:58  Total Treatment Time: 55  Group time: 0    Precautions: HTN, decreased balance, [place]KIT in [place]    Subjective:    Patient reports continued R knee pain which has mild relief with use of heating pad. Rates pain in the R knee to be 3/10 on a 0-10 scale with 0 being no pain and 10 being the worst pain imaginable.     Objective     Judy instructed in and performed therapeutic exercises to develop strength, endurance, flexibility, posture and core stabilization for 30 minutes including:   Rec bike level 2 x5 min  Seated HS stretch 2 x 60" each  Calf stretch on slant board x 2'   Calf raises 3 x 10   Standing hip abd 2# 2 x 10    Standing hip ext 2# x10  Up and down steps x 2 (NP)    Neuromuscular Re-education for improved balance, coordination of LEs, postural stability with narrowed DEEDEE  X 10 min   Standing balance: SLS x 10" x 3 trials each with UE support, modified tandem stance without UE assist 3x10 sec  PNF lifts with Red tball from mat to shelf x 10 each   1/4 turns over objects x 10 B directions with UE support as needed.     Gait tx for improved stability with direction changes and obstacle negotiations: X 15 min   Dynamic gait activities: head turns, obstacle negotiations (especially step overs with minimal UE assist), direction changes, speed changes in and out of parallel bars with varying assist from UEs     Written Home Exercises Provided: to continue with current written HEP.  Pt demonstrated good understanding of education provided with good return demonstration of exercises.    Assessment:     Judy tolerated treatment session well without increase of R knee pain. Reported mild dizziness following " ambulation with head turns toward left and right. Decreased savita and step length with walking and turning of head toward L, R, up, and down. She demonstrated improved step clearance during ambulation and decreased use of UE for gait activities in // bars. Continues with impaired balance on single leg, limited due to weakness, balance deficits, and R knee pain.   Pt will continue to benefit from skilled PT intervention. Medical Necessity is demonstrated by:  Fall Risk, Pain limits function of effected part for some activities, Unable to participate fully in daily activities, Requires skilled supervision to complete and progress HEP and Weakness.    Patient is making good progress towards established goals.    GOALS:   Short term goals: 4 weeks, pt agrees to goals set.  1. Increased Read score by 2-3 points for increased safety and balance for daily activities  2. Pt will ambulate on uneven surface with or without SC Mod I  3. Pt will ascend/descend 20 steps with 1 rail mod I ~MET  4. Pt will tolerate 45 minutes of physical therapy treatment with 1 rest break to demonstrate overall improvement in CV endurance  5. Pt will increase B LE strength by 1/3 muscle grade in all deficient planes for increased ease with stairs.     Long term goals: 8 weeks, pt agrees to goals set  1. Pt will ambulate 15-20 ft on grass with/without SC mod I ~SBA on 2/1/17  2. Pt will be independent with HEP. ~progessing 2/1/17   3. Pt will increase B LE strength by 1/3 muscle grade in all deficient planes for increased ease with stairs.  4. Pt will be able to perform > or = 10 chair rises without UE support in order to demonstrate improved LE strength and endurance.  5. Dynamic Gait index > 20/34 for decreased fall risk     Plan:  Update PT plan of care 2 x week for 4 weeks as of 2/2/17 progressing towards current goals       Safia Rodriguez

## 2017-02-09 ENCOUNTER — CLINICAL SUPPORT (OUTPATIENT)
Dept: REHABILITATION | Facility: HOSPITAL | Age: 82
End: 2017-02-09
Attending: FAMILY MEDICINE
Payer: COMMERCIAL

## 2017-02-09 DIAGNOSIS — Z91.81 RISK FOR FALLS: ICD-10-CM

## 2017-02-09 DIAGNOSIS — M81.0 OSTEOPOROSIS: ICD-10-CM

## 2017-02-09 PROCEDURE — 97110 THERAPEUTIC EXERCISES: CPT | Mod: PN

## 2017-02-09 PROCEDURE — 97112 NEUROMUSCULAR REEDUCATION: CPT | Mod: PN

## 2017-02-09 NOTE — PROGRESS NOTES
"Name: Judy Thompson  Clinic Number: [id number]  Date of Treatment: 02/09/2017   Diagnosis:   Encounter Diagnoses   Name Primary?    Risk for falls     Osteoporosis        Visit number: 13  Start date: 12/8/16  POC End date: 2/2/17  Updated Plan of Care: 2/2/17     Start time: 1:05  End time: 2:00  Total Treatment Time: 55  Group time: 30    Precautions: HTN, decreased balance, [place]IT in [place]    Subjective:    Patient reports continued R knee pain which has mild relief with use of heating pad. Rates pain in the R knee to be 3/10 on a 0-10 scale with 0 being no pain and 10 being the worst pain imaginable.     Objective     Judy instructed in and performed therapeutic exercises to develop strength, endurance, flexibility, posture and core stabilization for 30 minutes including:   Rec bike level 2 x5 min  Seated HS stretch 2 x 60" each  Calf stretch on slant board x 2'   Calf raises 3 x 10   Standing hip abd 2# 2 x 10    Standing hip ext with knee flexed2# x10  Up and down steps x 2    Neuromuscular Re-education for improved balance, coordination of LEs, postural stability with narrowed DEEDEE  X 10 min   Standing balance: SLS x 10" x 3 trials each with UE support, modified tandem stance without UE assist 3x10 sec  PNF lifts with Red tball from mat to shelf x 10 each   1/4 turns over objects x 10 B directions with UE support as needed.     Gait tx for improved stability with direction changes and obstacle negotiations: X 15 min   Dynamic gait activities: head turns, obstacle negotiations (especially step overs with minimal UE assist), direction changes, speed changes in and out of parallel bars with varying assist from UEs     Written Home Exercises Provided: to continue with current written HEP.  Pt demonstrated good understanding of education provided with good return demonstration of exercises.    Assessment:     Judy tolerated treatment session well without increase of R knee pain. Strength and endurance with " standing activities continues to improve. Balance with ambulation over hurdles in // bars has improved with decreased dependence on use of UEs. She demonstrated improved step clearance during ambulation on level surfaces. She remains impaired with balance on single leg due to LE weakness, balance deficits, and R knee pain.   Pt will continue to benefit from skilled PT intervention. Medical Necessity is demonstrated by:  Fall Risk, Pain limits function of effected part for some activities, Unable to participate fully in daily activities, Requires skilled supervision to complete and progress HEP and Weakness.    Patient is making good progress towards established goals.    GOALS:   Short term goals: 4 weeks, pt agrees to goals set.  1. Increased Read score by 2-3 points for increased safety and balance for daily activities  2. Pt will ambulate on uneven surface with or without SC Mod I  3. Pt will ascend/descend 20 steps with 1 rail mod I ~MET  4. Pt will tolerate 45 minutes of physical therapy treatment with 1 rest break to demonstrate overall improvement in CV endurance  5. Pt will increase B LE strength by 1/3 muscle grade in all deficient planes for increased ease with stairs.     Long term goals: 8 weeks, pt agrees to goals set  1. Pt will ambulate 15-20 ft on grass with/without SC mod I ~SBA on 2/1/17  2. Pt will be independent with HEP. ~progessing 2/1/17   3. Pt will increase B LE strength by 1/3 muscle grade in all deficient planes for increased ease with stairs.  4. Pt will be able to perform > or = 10 chair rises without UE support in order to demonstrate improved LE strength and endurance.  5. Dynamic Gait index > 20/34 for decreased fall risk     Plan:  Update PT plan of care 2 x week for 4 weeks as of 2/2/17 progressing towards current goals       Safia Rodriguez

## 2017-02-14 ENCOUNTER — CLINICAL SUPPORT (OUTPATIENT)
Dept: REHABILITATION | Facility: HOSPITAL | Age: 82
End: 2017-02-14
Attending: FAMILY MEDICINE
Payer: COMMERCIAL

## 2017-02-14 DIAGNOSIS — Z91.81 RISK FOR FALLS: Primary | ICD-10-CM

## 2017-02-14 DIAGNOSIS — M81.0 OSTEOPOROSIS: ICD-10-CM

## 2017-02-14 PROCEDURE — 97112 NEUROMUSCULAR REEDUCATION: CPT | Mod: PN

## 2017-02-14 PROCEDURE — 97110 THERAPEUTIC EXERCISES: CPT | Mod: PN

## 2017-02-14 NOTE — PROGRESS NOTES
"Name: Judy Thompson  Clinic Number: [id number]  Date of Treatment: 02/14/2017   Diagnosis:   No diagnosis found.    Visit number: 14  Start date: 12/8/16  POC End date: 2/2/17  Updated Plan of Care: 2/2/17     Start time: 1:00  End time: 2:00  Total Treatment Time: 55  Group time: 30    Precautions: HTN, decreased balance, [place]KIT in [place] in [place]    Subjective:    Patient reports continued R knee pain . Rates pain in the R knee to be 1-2/10 on a 0-10 scale with 0 being no pain and 10 being the worst pain imaginable.     Objective     Judy instructed in and performed therapeutic exercises to develop strength, endurance, flexibility, posture and core stabilization for 30 minutes (15 min 1:1) including:     Gait conditioning exercises:   Side leg kicks, leg swings, back leg kicks, knee straightners , arm swings, big steps,      bike level 2 x 5 min  Up and down steps  4-6 " steps 2 x 10     Neuromuscular Re-education for improved balance, coordination of LEs, postural stability with narrowed DEEDEE  X 15 min (1:1)  Stepping over hurdles   4 square stepping  In parallel bars       Written Home Exercises Provided: to continue with current written HEP.  Pt demonstrated good understanding of education provided with good return demonstration of exercises.    Assessment:   Improving hip strength and stability with gait noted with improved upright posture and stability during dynamic balance activity. Pt given updated HEP including  Pt will continue to benefit from skilled PT intervention. Medical Necessity is demonstrated by:  Fall Risk, Pain limits function of effected part for some activities, Unable to participate fully in daily activities, Requires skilled supervision to complete and progress HEP and Weakness.     Patient is making good progress towards established goals.    GOALS:   Short term goals: 4 weeks, pt agrees to goals set.  1. Increased Read score by 2-3 points for increased safety and balance for daily " activities  2. Pt will ambulate on uneven surface with or without SC Mod I -met   3. Pt will ascend/descend 20 steps with 1 rail mod I ~MET  4. Pt will tolerate 45 minutes of physical therapy treatment with 1 rest break to demonstrate overall improvement in CV endurance -progressing  5. Pt will increase B LE strength by 1/3 muscle grade in all deficient planes for increased ease with stairs. -met       Long term goals: 8 weeks, pt agrees to goals set  1. Pt will ambulate 15-20 ft on grass with/without SC mod I ~SBA on 2/1/17  2. Pt will be independent with HEP. ~progessing 2/1/17   3. Pt will increase B LE strength by 1/3 muscle grade in all deficient planes for increased ease with stairs.  4. Pt will be able to perform > or = 10 chair rises without UE support in order to demonstrate improved LE strength and endurance.  5. Dynamic Gait index > 20/34 for decreased fall risk     Plan:  Update PT plan of care 2 x week for 4 weeks as of 2/2/17 progressing towards current goals       Eladio Sánchez

## 2017-02-21 ENCOUNTER — CLINICAL SUPPORT (OUTPATIENT)
Dept: REHABILITATION | Facility: HOSPITAL | Age: 82
End: 2017-02-21
Attending: FAMILY MEDICINE
Payer: COMMERCIAL

## 2017-02-21 DIAGNOSIS — Z91.81 RISK FOR FALLS: Primary | ICD-10-CM

## 2017-02-21 PROCEDURE — 97110 THERAPEUTIC EXERCISES: CPT | Mod: PN

## 2017-02-21 PROCEDURE — 97112 NEUROMUSCULAR REEDUCATION: CPT | Mod: PN

## 2017-02-21 NOTE — PROGRESS NOTES
"Name: Judy Thompson  Clinic Number: [id number]  Date of Treatment: 02/21/2017   Diagnosis:   No diagnosis found.    Visit number: 15  Start date: 12/8/16        Precautions: HTN, decreased balance, [place]IT in [place] in [place]    Subjective:    Patient reports continued R knee pain . Rates pain in the R knee to be 1-2/10 on a 0-10 scale with 0 being no pain and 10 being the worst pain imaginable.     Objective     Judy instructed in and performed therapeutic exercises to develop strength, endurance, flexibility, posture and core stabilization for 30 minutes (15 min 1:1) including:     Gait conditioning exercises:   Side leg kicks, leg swings, back leg kicks, knee straightners , arm swings, big steps,      bike level 2 x 5 min  Up and down steps  4-6 " steps 2 x 10 (NP)    Neuromuscular Re-education for improved balance, coordination of LEs, postural stability with narrowed DEEDEE  X 15 min (1:1)  Stepping over hurdles   4 square stepping  In parallel bars   Static stance on airex with manual perturbations x 60 sec x 2     Written Home Exercises Provided: to continue with current written HEP.  Pt demonstrated good understanding of education provided with good return demonstration of exercises.    Assessment:   Improving hip strength and stability with gait noted with improved upright posture and stability during dynamic balance activity. Pt given updated HEP including    Pt will continue to benefit from skilled PT intervention. Medical Necessity is demonstrated by:  Fall Risk, Pain limits function of effected part for some activities, Unable to participate fully in daily activities, Requires skilled supervision to complete and progress HEP and Weakness.     Patient is making good progress towards established goals.    GOALS:   Short term goals: 4 weeks, pt agrees to goals set.  1. Increased Read score by 2-3 points for increased safety and balance for daily activities  2. Pt will ambulate on uneven surface with or without SC Mod " I -met   3. Pt will ascend/descend 20 steps with 1 rail mod I ~MET  4. Pt will tolerate 45 minutes of physical therapy treatment with 1 rest break to demonstrate overall improvement in CV endurance -progressing  5. Pt will increase B LE strength by 1/3 muscle grade in all deficient planes for increased ease with stairs. -met       Long term goals: 8 weeks, pt agrees to goals set  1. Pt will ambulate 15-20 ft on grass with/without SC mod I ~SBA on 2/1/17  2. Pt will be independent with HEP. ~progessing 2/1/17   3. Pt will increase B LE strength by 1/3 muscle grade in all deficient planes for increased ease with stairs.  4. Pt will be able to perform > or = 10 chair rises without UE support in order to demonstrate improved LE strength and endurance.  5. Dynamic Gait index > 20/34 for decreased fall risk     Plan:  Update PT plan of care 2 x week for 4 weeks as of 2/2/17 progressing towards current goals       Eladio Sánchez

## 2017-03-08 ENCOUNTER — CLINICAL SUPPORT (OUTPATIENT)
Dept: REHABILITATION | Facility: HOSPITAL | Age: 82
End: 2017-03-08
Attending: FAMILY MEDICINE
Payer: COMMERCIAL

## 2017-03-08 DIAGNOSIS — M81.0 OSTEOPOROSIS: ICD-10-CM

## 2017-03-08 DIAGNOSIS — Z91.81 RISK FOR FALLS: ICD-10-CM

## 2017-03-08 PROCEDURE — 97112 NEUROMUSCULAR REEDUCATION: CPT | Mod: PN

## 2017-03-08 PROCEDURE — 97110 THERAPEUTIC EXERCISES: CPT | Mod: PN

## 2017-03-08 NOTE — PROGRESS NOTES
"Name: Judy Thompson  Clinic Number: [id number]  Date of Treatment: 03/08/2017   Diagnosis:   Encounter Diagnoses   Name Primary?    Risk for falls     Osteoporosis        Visit number: 16  Start date: 12/8/16    Start time: 11:59  End time: 12:47  Total Treatment time: 45  Group time: 0    Precautions: HTN, decreased balance, [place] ANKIT in [place] in [place]    Subjective:    Patient reports continued R knee pain . Rates pain in the R knee to be 1-2/10 on a 0-10 scale with 0 being no pain and 10 being the worst pain imaginable.     Objective     Judy instructed in and performed therapeutic exercises to develop strength, endurance, flexibility, posture and core stabilization for 30 minutes including:     Gait conditioning exercises:   Side leg kicks, leg swings, back leg kicks, knee straightners, arm swings, big steps, hip circles, forward-moving lateral steps; 2x10 each    Rec bike (seat 7) level 2 x 5 min  Up and down stairs four 6" steps x3     Neuromuscular Re-education for improved balance, coordination of LEs, postural stability with narrowed DEEDEE  X 15 minutes including:  Stepping over hurdles   4 square stepping  In parallel bars   Static stance on airex with manual perturbations x 60 sec x 2     Written Home Exercises Provided: to continue with current written HEP with improved compliance.  Pt demonstrated good understanding of education provided with good return demonstration of exercises.    Assessment:   Judy tolerated treatment session well overall without increased knee pain. Hip strength and stability are improving yet pt remains hesitant with prolonged single leg standing or balance activities with NBOS. Pt static and dynamic stability are slowly improving.    Pt will continue to benefit from skilled PT intervention. Medical Necessity is demonstrated by:  Fall Risk, Pain limits function of effected part for some activities, Unable to participate fully in daily activities, Requires skilled supervision to complete " and progress HEP and Weakness.     Patient is making good progress towards established goals.    GOALS:   Short term goals: 4 weeks, pt agrees to goals set.  1. Increased Read score by 2-3 points for increased safety and balance for daily activities  2. Pt will ambulate on uneven surface with or without SC Mod I -met   3. Pt will ascend/descend 20 steps with 1 rail mod I ~MET  4. Pt will tolerate 45 minutes of physical therapy treatment with 1 rest break to demonstrate overall improvement in CV endurance -progressing  5. Pt will increase B LE strength by 1/3 muscle grade in all deficient planes for increased ease with stairs. -met    Long term goals: 8 weeks, pt agrees to goals set  1. Pt will ambulate 15-20 ft on grass with/without SC mod I ~SBA on 2/1/17  2. Pt will be independent with HEP. ~progessing 2/1/17   3. Pt will increase B LE strength by 1/3 muscle grade in all deficient planes for increased ease with stairs.  4. Pt will be able to perform > or = 10 chair rises without UE support in order to demonstrate improved LE strength and endurance.  5. Dynamic Gait index > 20/34 for decreased fall risk     Plan:  Update PT plan of care 2 x week for 4 weeks as of 2/2/17 progressing towards current goals       Safia Rodriguez

## 2017-03-09 ENCOUNTER — OFFICE VISIT (OUTPATIENT)
Dept: PODIATRY | Facility: CLINIC | Age: 82
End: 2017-03-09
Payer: MEDICARE

## 2017-03-09 DIAGNOSIS — L60.2 ONYCHAUXIS: Primary | ICD-10-CM

## 2017-03-09 PROCEDURE — 99212 OFFICE O/P EST SF 10 MIN: CPT | Mod: PBBFAC,PO

## 2017-03-09 PROCEDURE — 99499 UNLISTED E&M SERVICE: CPT | Mod: CSM,,,

## 2017-03-09 PROCEDURE — 17999 UNLISTD PX SKN MUC MEMB SUBQ: CPT | Mod: CSM,,,

## 2017-03-09 PROCEDURE — 99999 PR PBB SHADOW E&M-EST. PATIENT-LVL II: CPT | Mod: PBBFAC,,,

## 2017-03-09 NOTE — MR AVS SNAPSHOT
[facility]  [address] [facility]  [facility] [address]  Phone: [phone]                  Judy Thompson   3/9/2017 11:15 AM   Office Visit    Description:  Female : 1933   Provider:  Braeden Vazquez DPM   Department:  [facility]           Reason for Visit     Routine Foot Care                To Do List           Future Appointments        Provider Department Dept Phone    3/15/2017 2:00 PM Safia Rodriguez, PTA [facility] [phone]    3/21/2017 11:00 AM Eladio Sánchez, PT [facility] [phone]    2017 10:45 AM Braeden Vazquez DPM [facility] [phone]    2017 10:30 AM [facility] [phone]    2017 10:00 AM [facility] [place] [facility] [phone]      Goals (5 Years of Data)     None      [facility]     [facility] -  Assistance  Registered nurses in the [facility] provide clinical advisement, health education, appointment booking, and other advisory services.  Call for this free service at [phone].             Medications           Message regarding Medications     Verify the changes and/or additions to your medication regime listed below are the same as discussed with your clinician today.  If any of these changes or additions are incorrect, please notify your healthcare provider.             Verify that the below list of medications is an accurate representation of the medications you are currently taking.  If none reported, the list may be blank. If incorrect, please contact your healthcare provider. Carry this list with you in case of emergency.           Current Medications     ASCORBATE CALCIUM (VITAMIN C ORAL) Take 1 tablet by mouth.    aspirin 81 mg Tab Take 1 tablet by mouth Daily.    benazepril (LOTENSIN) 40 MG tablet TAKE 1 TABLET BY MOUTH DAILY    calcium carbonate-vit D3-min (CALCIUM 600 + MINERALS)  600 mg calcium- 400 unit Tab Take 1 tablet by mouth once daily.    cholecalciferol, vitamin D3, (VITAMIN D3) 400 unit Chew Take by mouth.    DENOSUMAB (PROLIA SUBQ) Inject into the skin. Every 6 months    diazePAM (VALIUM) 5 MG tablet Take 1 tablet (5 mg total) by mouth 2 (two) times daily as needed.    DUEXIS 800-26.6 mg Tab Take 800 mg by mouth 3 (three) times daily.     FSH/FLX/PRM/BLK/BOR/OM3,6,9 #5 (OMEGA 3-6-9 FATTY ACIDS ORAL) Take 1 capsule by mouth.    glucosamine-chondroitin 500-400 mg tablet Take 1 tablet by mouth once daily.    multivitamin (THERAGRAN) per tablet Take 1 tablet by mouth Daily.    tavaborole (KERYDIN) 5 % Karuna Apply 1 application topically once daily.           Clinical Reference Information           Allergies as of 3/9/2017     Codeine      Immunizations Administered on Date of Encounter - 3/9/2017     None      Language Assistance Services     ATTENTION: Language assistance services are available, free of charge. Please call [phone].      ATENCIÓN: Si lexi jaime, tiene a pena disposición servicios gratuitos de asistencia lingüística. Llame al [phone].     KALE Ý: N?u b?n nói Ti?ng Vi?t, có các d?ch v? h? tr? ngôn ng? mi?n phí dành cho b?n. G?i s? [phone].         [facility] complies with applicable Federal civil rights laws and does not discriminate on the basis of race, color, national origin, age, disability, or sex.

## 2017-03-15 ENCOUNTER — CLINICAL SUPPORT (OUTPATIENT)
Dept: REHABILITATION | Facility: HOSPITAL | Age: 82
End: 2017-03-15
Attending: FAMILY MEDICINE
Payer: COMMERCIAL

## 2017-03-15 DIAGNOSIS — Z91.81 RISK FOR FALLS: ICD-10-CM

## 2017-03-15 DIAGNOSIS — M81.0 OSTEOPOROSIS: ICD-10-CM

## 2017-03-15 PROCEDURE — 97110 THERAPEUTIC EXERCISES: CPT | Mod: PN

## 2017-03-15 PROCEDURE — 97112 NEUROMUSCULAR REEDUCATION: CPT | Mod: PN

## 2017-03-15 NOTE — PROGRESS NOTES
"Name: Judy Thompson  Clinic Number: [id number]  Date of Treatment: 03/15/2017   Diagnosis:   Encounter Diagnoses   Name Primary?    Risk for falls     Osteoporosis        Visit number: 18  Start date: 12/8/16    Start time: 2:00  End time: 2:50  Total Treatment time: 50  Group time: 0    Precautions: HTN, decreased balance, R ANKIT in 2015 in [place]    Subjective:    Patient reports minimal R knee pain this date. States she is having mild difficulty with her R ear/hearing aid and is seeing her doctor for that tomorrow. Rates pain in the R knee to be 1-2/10 on a 0-10 scale with 0 being no pain and 10 being the worst pain imaginable.     Objective     Judy instructed in and performed therapeutic exercises to develop strength, endurance, flexibility, posture and core stabilization for 30 minutes including:     Gait conditioning exercises:   Side leg kicks, leg swings, back leg kicks, knee straightners, arm swings, big steps, hip circles, forward-moving lateral steps; 2x10 each    Rec bike (seat 7) level 2 x 5 min  Up and down stairs four 6" steps x4  Sit-stand without UE use x 10    Neuromuscular Re-education for improved balance, coordination of LEs, postural stability with narrowed DEEDEE  X 20 minutes including:  Forward stepping over hurdles in // bars  4 square stepping in // bars with U UE hand hold  Side-stepping over hurdles in // bars  Static stance on airex with manual perturbations x 90 sec  Balance on airex with e/c x 45 sec       Written Home Exercises Provided: to continue with current written HEP with improved compliance.  Pt demonstrated good understanding of education provided with good return demonstration of exercises.    Assessment:   Pt tolerated treatment session well this date with improved balance in airex pad and with tandem standing. Continued hesitation with stepping activities without use of UE assistance. LE strength and balance are slowly improving. No complaint of increased R knee pain.     Pt " will continue to benefit from skilled PT intervention. Medical Necessity is demonstrated by:  Fall Risk, Pain limits function of effected part for some activities, Unable to participate fully in daily activities, Requires skilled supervision to complete and progress HEP and Weakness.     Patient is making good progress towards established goals.    GOALS:   Short term goals: 4 weeks, pt agrees to goals set.  1. Increased Read score by 2-3 points for increased safety and balance for daily activities  2. Pt will ambulate on uneven surface with or without SC Mod I -met   3. Pt will ascend/descend 20 steps with 1 rail mod I ~MET  4. Pt will tolerate 45 minutes of physical therapy treatment with 1 rest break to demonstrate overall improvement in CV endurance -progressing  5. Pt will increase B LE strength by 1/3 muscle grade in all deficient planes for increased ease with stairs. -met    Long term goals: 8 weeks, pt agrees to goals set  1. Pt will ambulate 15-20 ft on grass with/without SC mod I ~SBA on 2/1/17  2. Pt will be independent with HEP. ~progessing 2/1/17   3. Pt will increase B LE strength by 1/3 muscle grade in all deficient planes for increased ease with stairs.  4. Pt will be able to perform > or = 10 chair rises without UE support in order to demonstrate improved LE strength and endurance.  5. Dynamic Gait index > 20/34 for decreased fall risk     Plan:  Update PT plan of care 2 x week for 4 weeks as of 2/2/17 progressing towards current goals       Safia Rodriguez

## 2017-03-21 ENCOUNTER — CLINICAL SUPPORT (OUTPATIENT)
Dept: REHABILITATION | Facility: HOSPITAL | Age: 82
End: 2017-03-21
Attending: FAMILY MEDICINE
Payer: COMMERCIAL

## 2017-03-21 DIAGNOSIS — Z91.81 RISK FOR FALLS: Primary | ICD-10-CM

## 2017-03-21 PROCEDURE — G8978 MOBILITY CURRENT STATUS: HCPCS | Mod: CJ,PN

## 2017-03-21 PROCEDURE — G8979 MOBILITY GOAL STATUS: HCPCS | Mod: CJ,PN

## 2017-03-21 PROCEDURE — 97110 THERAPEUTIC EXERCISES: CPT | Mod: PN

## 2017-03-28 ENCOUNTER — CLINICAL SUPPORT (OUTPATIENT)
Dept: REHABILITATION | Facility: HOSPITAL | Age: 82
End: 2017-03-28
Attending: FAMILY MEDICINE
Payer: COMMERCIAL

## 2017-03-28 DIAGNOSIS — Z91.81 RISK FOR FALLS: Primary | ICD-10-CM

## 2017-03-28 PROCEDURE — 97110 THERAPEUTIC EXERCISES: CPT | Mod: PN

## 2017-03-28 NOTE — PROGRESS NOTES
"Name: Judy Thompson  Clinic Number: [id number]  Date of Treatment: 03/28/2017   Diagnosis:   Encounter Diagnosis   Name Primary?    Risk for falls Yes       Visit number: 13/25   Start date: 12/8/16      Precautions: HTN, decreased balance, [place] ANKIT in 2015 in [place]    Subjective: overall pt doing well, walking better, with improved balance, complains of pain in R lateral knee with prolonged walking, still feels the R Leg is weaker than L.     Objective     Tight psoas and adductors on R hip  Decreased glute med strength < 4/5 with tight anterior hip structures   SLS on R with Increased femoral IR due to weak abductors       Judy instructed in and performed therapeutic exercises to develop strength, endurance, flexibility, posture and core stabilization for 60 (30 min 1:1)minutes including:     Rec bike (seat 7) level 2 x 5 min  Psoas stretch off EOB x 60"     Prone   quad stretch x 60"   anterior hip PA mobs (R)   Quad set/Glute set x 10     Quadruped: rock back with PPT x 15     S/L:   Clamshells with OTB 3 x 10 R   Hip abduction with Sussy 2 x 10     Shuttle:   2 bands, Orion squat with Hip add teena using ball x 3'     Standing:   Calf stretch on slant board x 2'   Calf raises with ecc lowering 2 x 10   Lateral step ups/hip hikes on R w/ 2 ' block 3 x 10          Written Home Exercises Provided:   Gait conditioning exercises:   Side leg kicks, leg swings, back leg kicks, knee straightners, arm swings, big steps, hip circles, forward-moving lateral steps; 2x10 each    Pt demonstrated good understanding of education provided with good return demonstration of exercises.    Assessment:   Pt tolerated treatment session well this date with improved balance in airex pad and with tandem standing. Continued hesitation with stepping activities without use of UE assistance. LE strength and balance are slowly improving. No complaint of increased R knee pain.     Pt will continue to benefit from skilled PT intervention. Medical " Necessity is demonstrated by:  Fall Risk, Pain limits function of effected part for some activities, Unable to participate fully in daily activities, Requires skilled supervision to complete and progress HEP and Weakness.     Patient is making good progress towards established goals.    GOALS:     Long term goals: 8 weeks, pt agrees to goals set  1. Pt will ambulate 15-20 ft on grass with/without SC mod I ~MET   2. Pt will be independent with HEP. ~min A   3. Pt will increase B LE strength by 1/3 muscle grade in all deficient planes for increased ease with stairs. progressing   4. Pt will be able to perform > or = 10 chair rises without UE support in order to demonstrate improved LE strength and endurance. ~not met   5. Dynamic Gait index > 20/34 for decreased fall risk ~improving   6. Improved hip abduction and extension strength 4/5 on RLE for improved gait stability      Plan: Continue with Plan of care progressing towards established goals 1- 2 x per week cert pd 3/21/16 - 5/5/17      Eladio Sánchez       \I certify the need for these services furnished under this plan of treatment and while under my care.  ____________________________________ Physician/Referring Practitioner   Date of Signature

## 2017-03-28 NOTE — PROGRESS NOTES
"Name: Judy Thompson  Clinic Number: [id number]  Date of Treatment: 03/28/2017   Diagnosis:   Encounter Diagnosis   Name Primary?    Risk for falls Yes       Visit number: 14  Start date: 12/8/16      Precautions: HTN, decreased balance, [place] ANKIT in 2015 in [place]    Subjective:    Patient reports minimal R knee pain this date., continued weakness in RLE, some muscle soreness after last session in R hip     Objective     Judy instructed in and performed therapeutic exercises x 45 min ( 30 min 1:1)  to develop strength, endurance, flexibility, posture and core stabilization for 30 minutes including:       Rec bike (seat 7) level 2 x 5 min  Judy instructed in and performed therapeutic exercises to develop strength, endurance, flexibility, posture and core stabilization for 30 minutes including:      Rec bike (seat 7) level 2 x 5 min  Psoas stretch off EOB x 60"      Prone   quad stretch x 60"   anterior hip PA mobs (R)   Quad set/Glute set x 10      Quadruped: rock back with PPT x 15      S/L:   Clamshells with OTB 3 x 10 R   Hip abduction with Sussy 2 x 10      Shuttle:   2 bands, Orion squat with Hip add teena using ball x 3'      Standing:   Calf stretch on slant board x 2'   Calf raises with ecc lowering 2 x 10   Lateral step ups/hip hikes on R w/ 2 ' block 3 x 10           Written Home Exercises Provided: to continue with current written HEP with improved compliance.  Gait conditioning exercises:   Side leg kicks, leg swings, back leg kicks, knee straightners, arm swings, big steps, hip circles, forward-moving lateral steps; 2x10 each    Pt demonstrated good understanding of education provided with good return demonstration of exercises.    Assessment: Devante tx well, progress R hip strength/stability as tolerated     Pt will continue to benefit from skilled PT intervention. Medical Necessity is demonstrated by:  Fall Risk, Pain limits function of effected part for some activities, Unable to participate fully in daily " activities, Requires skilled supervision to complete and progress HEP and Weakness.     Patient is making good progress towards established goals.    GOALS:     Long term goals: 8 weeks, pt agrees to goals set  1. Pt will ambulate 15-20 ft on grass with/without SC mod I ~SBA on 2/1/17  2. Pt will be independent with HEP. ~progessing 2/1/17   3. Pt will increase B LE strength by 1/3 muscle grade in all deficient planes for increased ease with stairs.  4. Pt will be able to perform > or = 10 chair rises without UE support in order to demonstrate improved LE strength and endurance.  5. Dynamic Gait index > 20/34 for decreased fall risk   6. Increased posterior and lateral R hip strength 4/5 or greater for improved gait stability     Plan:  PT POC 3/21/17 -5/5/17 progressing towards current goals       Eladio Sánchez

## 2017-03-29 NOTE — PLAN OF CARE
"Name: Judy Thompson  Clinic Number: [id number]  Date of Treatment: 03/28/2017   Diagnosis:   Encounter Diagnosis   Name Primary?    Risk for falls Yes       Visit number: 13/25   Start date: 12/8/16      Precautions: HTN, decreased balance, [place] ANKIT in 2015 in [place]    Subjective: overall pt doing well, walking better, with improved balance, complains of pain in R lateral knee with prolonged walking, still feels the R Leg is weaker than L.     Objective     Tight psoas and adductors on R hip  Decreased glute med strength < 4/5 with tight anterior hip structures   SLS on R with Increased femoral IR due to weak abductors       Judy instructed in and performed therapeutic exercises to develop strength, endurance, flexibility, posture and core stabilization for 30 minutes including:     Rec bike (seat 7) level 2 x 5 min  Psoas stretch off EOB x 60"     Prone   quad stretch x 60"   anterior hip PA mobs (R)   Quad set/Glute set x 10     Quadruped: rock back with PPT x 15     S/L:   Clamshells with OTB 3 x 10 R   Hip abduction with Sussy 2 x 10     Shuttle:   2 bands, Orion squat with Hip add teena using ball x 3'     Standing:   Calf stretch on slant board x 2'   Calf raises with ecc lowering 2 x 10   Lateral step ups/hip hikes on R w/ 2 ' block 3 x 10          Written Home Exercises Provided:   Gait conditioning exercises:   Side leg kicks, leg swings, back leg kicks, knee straightners, arm swings, big steps, hip circles, forward-moving lateral steps; 2x10 each    Pt demonstrated good understanding of education provided with good return demonstration of exercises.    Assessment:   Pt tolerated treatment session well this date with improved balance in airex pad and with tandem standing. Continued hesitation with stepping activities without use of UE assistance. LE strength and balance are slowly improving. No complaint of increased R knee pain.     Pt will continue to benefit from skilled PT intervention. Medical Necessity is " demonstrated by:  Fall Risk, Pain limits function of effected part for some activities, Unable to participate fully in daily activities, Requires skilled supervision to complete and progress HEP and Weakness.     Patient is making good progress towards established goals.    GOALS:     Long term goals: 8 weeks, pt agrees to goals set  1. Pt will ambulate 15-20 ft on grass with/without SC mod I ~MET   2. Pt will be independent with HEP. ~min A   3. Pt will increase B LE strength by 1/3 muscle grade in all deficient planes for increased ease with stairs. progressing   4. Pt will be able to perform > or = 10 chair rises without UE support in order to demonstrate improved LE strength and endurance. ~not met   5. Dynamic Gait index > 20/34 for decreased fall risk ~improving   6. Improved hip abduction and extension strength 4/5 on RLE for improved gait stability      Plan: Continue with Plan of care progressing towards established goals 1- 2 x per week cert pd 3/21/16 - 5/5/17      Eladio Sánchez       \I certify the need for these services furnished under this plan of treatment and while under my care.  ____________________________________ Physician/Referring Practitioner   Date of Signature

## 2017-04-04 ENCOUNTER — CLINICAL SUPPORT (OUTPATIENT)
Dept: REHABILITATION | Facility: HOSPITAL | Age: 82
End: 2017-04-04
Attending: FAMILY MEDICINE
Payer: COMMERCIAL

## 2017-04-04 DIAGNOSIS — Z91.81 RISK FOR FALLS: Primary | ICD-10-CM

## 2017-04-04 PROCEDURE — 97110 THERAPEUTIC EXERCISES: CPT | Mod: PN

## 2017-04-04 NOTE — PROGRESS NOTES
"Name: Judy Thompson  Clinic Number: [id number]  Date of Treatment: 04/04/2017   Diagnosis:   Encounter Diagnosis   Name Primary?    Risk for falls Yes       Visit number: 14  Start date: 12/8/16      Precautions: HTN, decreased balance, R ANKIT in 2015 in [place]    Subjective:    Patient reports minimal R knee pain this date., continued weakness in RLE, some muscle soreness after last session in R hip     Objective     Judy instructed in and performed therapeutic exercises x 60 min ( 30 min 1:1)  to develop strength, endurance, flexibility, posture and core stabilization for 30 minutes including:     Judy instructed in and performed therapeutic exercises to develop strength, endurance, flexibility, posture and core stabilization for 30 minutes including:      Rec bike (seat 7) level 2 x 5 min    Supine   Hamstring stretch Orion (passive)  R adductor stretch (passive)   Psoas stretch off EOB x 60"  Hip add squeeze with ball x 10 + bridge x 10   Hip abd with RTB x 10 + bridge x 10       Prone   quad stretch x 60"   anterior hip PA mobs (R)   Quad set/Glute set x 10      Quadruped: rock back with PPT x 15      S/L:   Clamshells with OTB 3 x 10 R   Hip abduction with Sussy 2 x 10      Shuttle: (NP)   2 bands, Orion squat with Hip add teena using ball x 3'      Standing:   Calf stretch on slant board x 2'   Calf raises with ecc lowering 2 x 10   Lateral step ups/hip hikes on R w/ 2 ' block 3 x 10           Written Home Exercises Provided: to continue with current written HEP with improved compliance.  Gait conditioning exercises:   Side leg kicks, leg swings, back leg kicks, knee straightners, arm swings, big steps, hip circles, forward-moving lateral steps; 2x10 each    Pt demonstrated good understanding of education provided with good return demonstration of exercises.    Assessment: Devante tx well, progress R hip strength/stability as tolerated; cues needed     Pt will continue to benefit from skilled PT intervention. Medical " Necessity is demonstrated by:  Fall Risk, Pain limits function of effected part for some activities, Unable to participate fully in daily activities, Requires skilled supervision to complete and progress HEP and Weakness.     Patient is making good progress towards established goals.    GOALS:     Long term goals: 8 weeks, pt agrees to goals set  1. Pt will ambulate 15-20 ft on grass with/without SC mod I ~SBA on 2/1/17  2. Pt will be independent with HEP. ~progessing 2/1/17   3. Pt will increase B LE strength by 1/3 muscle grade in all deficient planes for increased ease with stairs.  4. Pt will be able to perform > or = 10 chair rises without UE support in order to demonstrate improved LE strength and endurance.  5. Dynamic Gait index > 20/34 for decreased fall risk   6. Increased posterior and lateral R hip strength 4/5 or greater for improved gait stability     Plan:  PT POC 3/21/17 -5/5/17 progressing towards current goals       Eladio Sánchez

## 2017-04-08 RX ORDER — BENAZEPRIL HYDROCHLORIDE 40 MG/1
TABLET ORAL
Qty: 90 TABLET | Refills: 0 | Status: CANCELLED | OUTPATIENT
Start: 2017-04-08

## 2017-04-11 ENCOUNTER — CLINICAL SUPPORT (OUTPATIENT)
Dept: REHABILITATION | Facility: HOSPITAL | Age: 82
End: 2017-04-11
Attending: FAMILY MEDICINE
Payer: COMMERCIAL

## 2017-04-11 DIAGNOSIS — Z91.81 RISK FOR FALLS: ICD-10-CM

## 2017-04-11 DIAGNOSIS — M81.0 OSTEOPOROSIS, UNSPECIFIED OSTEOPOROSIS TYPE, UNSPECIFIED PATHOLOGICAL FRACTURE PRESENCE: ICD-10-CM

## 2017-04-11 PROCEDURE — 97110 THERAPEUTIC EXERCISES: CPT | Mod: PN

## 2017-04-11 RX ORDER — BENAZEPRIL HYDROCHLORIDE 40 MG/1
40 TABLET ORAL DAILY
Qty: 90 TABLET | Refills: 3 | Status: SHIPPED | OUTPATIENT
Start: 2017-04-11 | End: 2018-03-26 | Stop reason: SDUPTHER

## 2017-04-11 NOTE — TELEPHONE ENCOUNTER
----- Message from Leona Alston sent at 4/11/2017  1:17 PM CDT -----  Contact: self  Needs refill on BP pills, states pharmacy has already contacted you.  Please call back at     [facility]. [id number] [place] [address] [address] [address]  Phone: [phone] Fax: [phone]

## 2017-04-11 NOTE — PROGRESS NOTES
"Name: Judy Thompson  Clinic Number: [id number]  Date of Treatment: 04/11/2017   Diagnosis:   Encounter Diagnoses   Name Primary?    Risk for falls     Osteoporosis, unspecified osteoporosis type, unspecified pathological fracture presence        Visit number: 15  Start date: 12/8/16      Precautions: HTN, decreased balance, [place]KIT in [place] in [place]    Subjective:    Patient reports minimal R knee pain this date., continued weakness in RLE, some muscle soreness after last session in R hip     Objective     Judy instructed in and performed therapeutic exercises to develop strength, endurance, flexibility, posture and core stabilization x 60 minutes including:     Rec bike (seat 7) level 2 x 5 min    Supine   Hamstring stretch Orion (passive)  R adductor stretch (passive)   Psoas stretch off EOB x 60"  Hip add squeeze with ball x 10 + bridge x 10   Hip abd with RTB x 10 + bridge 2 x 10       Prone   quad stretch x 60"   anterior hip PA mobs (R) (NP)  Quad set/Glute set x 10      Quadruped: rock back with PPT x 15 (NP)     S/L:   Clamshells with RTB 3 x 10 R   Hip abduction with Sussy 2 x 10      Shuttle: (previous)   2 bands, Orion squat with Hip add teena using ball x 3'      Standing:   Calf stretch on slant board x 2'   Calf raises with ecc lowering 2 x 10   Lateral step ups/hip hikes on R w/ 2 ' block 3 x 10      Written Home Exercises Provided: to continue with current written HEP with improved compliance.  Pt demonstrated good understanding of education provided with good return demonstration of exercises.    Assessment:   Judy tolerated treatment session well overall this date however she continues with limited hip and lumbar mobility and core weakness. Verbal and manual cues required with several exercises for proper technique and Sussy for SL hip abd. She will benefit from continued core and LE strengthening.     Pt will continue to benefit from skilled PT intervention. Medical Necessity is demonstrated by:  Fall Risk, " Pain limits function of effected part for some activities, Unable to participate fully in daily activities, Requires skilled supervision to complete and progress HEP and Weakness.     Patient is making good progress towards established goals.    GOALS:     Long term goals: 8 weeks, pt agrees to goals set  1. Pt will ambulate 15-20 ft on grass with/without SC mod I ~SBA on 2/1/17  2. Pt will be independent with HEP. ~progessing 2/1/17   3. Pt will increase B LE strength by 1/3 muscle grade in all deficient planes for increased ease with stairs.  4. Pt will be able to perform > or = 10 chair rises without UE support in order to demonstrate improved LE strength and endurance.  5. Dynamic Gait index > 20/34 for decreased fall risk   6. Increased posterior and lateral R hip strength 4/5 or greater for improved gait stability     Plan:  PT POC 3/21/17 -5/5/17 progressing towards current goals     Safia Rodriguez

## 2017-04-17 RX ORDER — IBUPROFEN AND FAMOTIDINE 26.6; 8 MG/1; MG/1
800 TABLET ORAL 3 TIMES DAILY
Qty: 90 TABLET | Refills: 3 | Status: SHIPPED | OUTPATIENT
Start: 2017-04-17 | End: 2017-09-21 | Stop reason: SDUPTHER

## 2017-04-17 NOTE — TELEPHONE ENCOUNTER
----- Message from Nuha Cannon sent at 4/17/2017 11:38 AM CDT -----  Contact: [phone]  Pt is requesting a rx for Duexis Ibuprofren/Famotidine/800/26.6 mg/Taken 1 tab 3 times daily /Pt uses [facility] on [address]/Please call

## 2017-04-25 ENCOUNTER — CLINICAL SUPPORT (OUTPATIENT)
Dept: REHABILITATION | Facility: HOSPITAL | Age: 82
End: 2017-04-25
Attending: FAMILY MEDICINE
Payer: COMMERCIAL

## 2017-04-25 DIAGNOSIS — Z91.81 RISK FOR FALLS: Primary | ICD-10-CM

## 2017-04-25 DIAGNOSIS — M81.0 OSTEOPOROSIS, UNSPECIFIED OSTEOPOROSIS TYPE, UNSPECIFIED PATHOLOGICAL FRACTURE PRESENCE: ICD-10-CM

## 2017-04-25 PROCEDURE — 97110 THERAPEUTIC EXERCISES: CPT | Mod: PN

## 2017-04-25 NOTE — PROGRESS NOTES
"Name: Judy Thompson  Clinic Number: [id number]  Date of Treatment: 04/25/2017   Diagnosis:   Encounter Diagnoses   Name Primary?    Risk for falls Yes    Osteoporosis, unspecified osteoporosis type, unspecified pathological fracture presence        Visit number: 17  Start date: 12/8/16      Precautions: HTN, decreased balance, [place]KIT in [place] in [place]    Subjective: no complaints of pain today     Objective     Judy instructed in and performed therapeutic exercises to develop strength, endurance, flexibility, posture and core stabilization x 60 minutes including:     Rec bike (seat 7) level 2 x 5 min    Supine   Hamstring stretch Orion (passive)  R adductor stretch (passive)   Psoas stretch off EOB x 60"  Hip add squeeze with ball x 10 + bridge x 10   Hip abd with RTB x 10 + bridge 2 x 10       Prone   quad stretch x 60"   anterior hip PA mobs (R)   Hip ext 2 x 10 (R)      Quadruped: rock back with PPT x 15      S/L:   Clamshells with RTB 3 x 10 R   Hip abduction with Sussy 2 x 10      Shuttle: (previous)   2 bands, Orion squat with Hip add teena using ball x 3'      Standing:   Calf stretch on slant board x 2'   Calf raises with ecc lowering 2 x 10   Lateral step ups/hip hikes on R w/ 2 ' block 3 x 10      Written Home Exercises Provided: to continue with current written HEP with improved compliance.  Pt demonstrated good understanding of education provided with good return demonstration of exercises.    Assessment:   Judy tolerated treatment session well overall this date however she continues with  Hip and core weakness. She will benefit from continued core and LE strengthening.     Pt will continue to benefit from skilled PT intervention. Medical Necessity is demonstrated by:  Fall Risk, Pain limits function of effected part for some activities, Unable to participate fully in daily activities, Requires skilled supervision to complete and progress HEP and Weakness.     Patient is making good progress towards established " goals.    GOALS:     Long term goals: 8 weeks, pt agrees to goals set  1. Pt will ambulate 15-20 ft on grass with/without SC mod I ~SBA on 2/1/17  2. Pt will be independent with HEP. ~progessing 2/1/17   3. Pt will increase B LE strength by 1/3 muscle grade in all deficient planes for increased ease with stairs.  4. Pt will be able to perform > or = 10 chair rises without UE support in order to demonstrate improved LE strength and endurance.  5. Dynamic Gait index > 20/34 for decreased fall risk   6. Increased posterior and lateral R hip strength 4/5 or greater for improved gait stability     Plan:  PT POC 3/21/17 -5/5/17 progressing towards current goals     Eladio Sánchez

## 2017-05-01 RX ORDER — DIAZEPAM 5 MG/1
TABLET ORAL
Qty: 60 TABLET | Refills: 0 | Status: SHIPPED | OUTPATIENT
Start: 2017-05-01 | End: 2017-06-23 | Stop reason: SDUPTHER

## 2017-05-09 ENCOUNTER — CLINICAL SUPPORT (OUTPATIENT)
Dept: REHABILITATION | Facility: HOSPITAL | Age: 82
End: 2017-05-09
Attending: FAMILY MEDICINE
Payer: COMMERCIAL

## 2017-05-09 DIAGNOSIS — Z91.81 RISK FOR FALLS: ICD-10-CM

## 2017-05-09 DIAGNOSIS — M81.0 OSTEOPOROSIS, UNSPECIFIED OSTEOPOROSIS TYPE, UNSPECIFIED PATHOLOGICAL FRACTURE PRESENCE: ICD-10-CM

## 2017-05-09 PROCEDURE — 97110 THERAPEUTIC EXERCISES: CPT | Mod: PN

## 2017-05-09 NOTE — PROGRESS NOTES
"Name: Judy Thompson  Clinic Number: [id number]  Date of Treatment: 05/09/2017   Diagnosis:   Encounter Diagnoses   Name Primary?    Risk for falls     Osteoporosis, unspecified osteoporosis type, unspecified pathological fracture presence        Visit number: 18  Start date: 12/8/16      Precautions: HTN, decreased balance, R ANKIT in 2015 in [place]    Subjective: Judy states she has been having more pain in B knees, plans to possibly have more injections soon. Rates B knee pain at 6/10, R > L knee.     Objective     Judy instructed in and performed therapeutic exercises to develop strength, endurance, flexibility, posture and core stabilization x 60 minutes including:     Rec bike (seat 7) level 2 x 7 min    Supine   Hamstring stretch Orion (passive)  R adductor stretch (passive)   Psoas stretch off EOB x 60"  Hip add squeeze with ball x 10 + bridge x 10   Hip abd with RTB x 10 + bridge 2 x 10       Prone   quad stretch x 60"   anterior hip PA mobs (R)   Hip ext 2 x 10 (R)      Quadruped: rock back with PPT x 15      S/L:   Clamshells with RTB 3 x 10 R   Hip abduction with Sussy 2 x 10      Shuttle: (previous)   2 bands, Orion squat with Hip add teena using ball x 3'      Standing:   Calf stretch on slant board x 2'   Calf raises with ecc lowering 2 x 10   Lateral step ups/hip hikes on R w/ 2 ' block 3 x 10      Written Home Exercises Provided: to continue with current written HEP with improved compliance.  Pt demonstrated good understanding of education provided with good return demonstration of exercises.    Assessment:   Pt demonstrated overall good tolerance to treatment session this date. No change of knee pain reported at end of session. She continues with B hip and core weakness, R > L and remains limited with functional activity due to weakness and R knee pain. She will benefit from continued progression of strength and stability.     Pt will continue to benefit from skilled PT intervention. Medical Necessity is " demonstrated by:  Fall Risk, Pain limits function of effected part for some activities, Unable to participate fully in daily activities, Requires skilled supervision to complete and progress HEP and Weakness.     Patient is making good progress towards established goals.    GOALS:     Long term goals: 8 weeks, pt agrees to goals set  1. Pt will ambulate 15-20 ft on grass with/without SC mod I ~SBA on 2/1/17  2. Pt will be independent with HEP. ~progessing 2/1/17   3. Pt will increase B LE strength by 1/3 muscle grade in all deficient planes for increased ease with stairs.  4. Pt will be able to perform > or = 10 chair rises without UE support in order to demonstrate improved LE strength and endurance.  5. Dynamic Gait index > 20/34 for decreased fall risk   6. Increased posterior and lateral R hip strength 4/5 or greater for improved gait stability     Plan:  PT POC 3/21/17 -5/5/17 progressing towards current goals     Safia Rodriguez

## 2017-05-16 ENCOUNTER — CLINICAL SUPPORT (OUTPATIENT)
Dept: REHABILITATION | Facility: HOSPITAL | Age: 82
End: 2017-05-16
Attending: FAMILY MEDICINE
Payer: COMMERCIAL

## 2017-05-16 DIAGNOSIS — Z91.81 RISK FOR FALLS: Primary | ICD-10-CM

## 2017-05-16 DIAGNOSIS — M81.0 OSTEOPOROSIS, UNSPECIFIED OSTEOPOROSIS TYPE, UNSPECIFIED PATHOLOGICAL FRACTURE PRESENCE: ICD-10-CM

## 2017-05-16 PROCEDURE — G8979 MOBILITY GOAL STATUS: HCPCS | Mod: CJ,PN

## 2017-05-16 PROCEDURE — 97110 THERAPEUTIC EXERCISES: CPT | Mod: KX,PN

## 2017-05-16 PROCEDURE — G8978 MOBILITY CURRENT STATUS: HCPCS | Mod: CJ,PN

## 2017-05-23 ENCOUNTER — CLINICAL SUPPORT (OUTPATIENT)
Dept: REHABILITATION | Facility: HOSPITAL | Age: 82
End: 2017-05-23
Attending: FAMILY MEDICINE
Payer: COMMERCIAL

## 2017-05-23 DIAGNOSIS — Z91.81 RISK FOR FALLS: ICD-10-CM

## 2017-05-23 DIAGNOSIS — M81.0 OSTEOPOROSIS, UNSPECIFIED OSTEOPOROSIS TYPE, UNSPECIFIED PATHOLOGICAL FRACTURE PRESENCE: ICD-10-CM

## 2017-05-23 PROCEDURE — 97110 THERAPEUTIC EXERCISES: CPT | Mod: PN

## 2017-05-23 NOTE — PROGRESS NOTES
"Name: Judy Thompson  Clinic Number: [id number]  Date of Treatment: 05/23/2017   Diagnosis:   Encounter Diagnoses   Name Primary?    Risk for falls     Osteoporosis, unspecified osteoporosis type, unspecified pathological fracture presence        Visit number: 20  Start date: 12/8/16    Precautions: HTN, decreased balance, R ANKIT in 2015 in [place]    Subjective:   Judy states she has not been having much improvement in the knees since having the injections. Having another injection soon. Rates B knee pain at 5/10, R > L knee.     Objective:     Dynamic gait index ~ 20/30  (prior)    Judy instructed in and performed therapeutic exercises to develop strength, endurance, flexibility, posture and core stabilization x 60 minutes including:     Rec bike (seat 7) level 2 x 8 min    Supine:    Active Hamstring stretch Orion 5" x 10 each  R adductor stretch (passive)   Psoas stretch off EOB x 60"  Hip add squeeze with ball 2x10 + bridge x10   Hip abd with RTB x 10 + bridge 2 x 10       Prone:   quad stretch x 60"   anterior hip PA mobs (R)   Hip ext 2 x 10 (R)      Quadruped:  rock back with PPT x 15 (NP)     S/L:   Clamshells with YTB 3 x 10 R   Hip abduction with YTB 2 x 10 R     Shuttle: (NP)   2 bands, Orion squat with Hip add teena using ball x 3'      Standing:   Calf stretch on slant board x 2'   Calf raises with ecc lowering 2"/4" 2 x 10   Lateral step ups/hip hikes on R w/ 2in block 3 x 10      Written Home Exercises Provided: to continue with current written HEP with improved compliance.  Pt demonstrated good understanding of education provided with good return demonstration of exercises.      Assessment:   Judy tolerated treatment session well this date. She continues with LE fatigue with standing exercises. Decreased knee pain reported at end of session with minimal pain reported. She continues with B hip and core weakness, R > L LE. Difficulty with functional activity due to weakness and R knee pain. Patient will " continue to benefit from progressive strength and stability training as tolerated.    Pt will continue to benefit from skilled PT intervention. Medical Necessity is demonstrated by:  Fall Risk, Pain limits function of effected part for some activities, Unable to participate fully in daily activities, Requires skilled supervision to complete and progress HEP and Weakness.     GOALS:  8 weeks, pt agrees to goals set:   1. Pt will ambulate 15-20 ft on grass with/without SC mod I ~MET   2. Pt will be independent with HEP. ~progessing   3. Pt will increase B LE strength by 1/3 muscle grade in all deficient planes for increased ease with stairs.~ 4/5 hip abduction     4. Pt will be able to perform > or = 10 chair rises without UE support in order to demonstrate improved LE strength and endurance.    5. Dynamic Gait index > 20/30 for decreased fall risk ~progressing   6. Increased posterior and lateral R hip strength 4/5 or greater for improved gait stability ~MET       Plan:  Extend PT POC 1 x per week for 6-8 weeks as pt will be out of town for 2 weeks.      Safia Rodriguez

## 2017-05-30 ENCOUNTER — OFFICE VISIT (OUTPATIENT)
Dept: PODIATRY | Facility: CLINIC | Age: 82
End: 2017-05-30
Payer: COMMERCIAL

## 2017-05-30 VITALS — BODY MASS INDEX: 24.5 KG/M2 | HEIGHT: 63 IN | WEIGHT: 138.25 LBS

## 2017-05-30 DIAGNOSIS — L60.2 ONYCHAUXIS: Primary | ICD-10-CM

## 2017-05-30 PROCEDURE — 99999 PR PBB SHADOW E&M-EST. PATIENT-LVL II: CPT | Mod: PBBFAC,,,

## 2017-05-30 PROCEDURE — 17999 UNLISTD PX SKN MUC MEMB SUBQ: CPT | Mod: S$GLB,,,

## 2017-05-30 PROCEDURE — 99499 UNLISTED E&M SERVICE: CPT | Mod: S$GLB,,,

## 2017-05-31 ENCOUNTER — CLINICAL SUPPORT (OUTPATIENT)
Dept: REHABILITATION | Facility: HOSPITAL | Age: 82
End: 2017-05-31
Attending: FAMILY MEDICINE
Payer: COMMERCIAL

## 2017-05-31 DIAGNOSIS — M81.0 OSTEOPOROSIS, UNSPECIFIED OSTEOPOROSIS TYPE, UNSPECIFIED PATHOLOGICAL FRACTURE PRESENCE: ICD-10-CM

## 2017-05-31 DIAGNOSIS — Z91.81 RISK FOR FALLS: ICD-10-CM

## 2017-05-31 PROCEDURE — 97110 THERAPEUTIC EXERCISES: CPT | Mod: KX,PN

## 2017-05-31 NOTE — PROGRESS NOTES
"Name: Judy Thompson  Clinic Number: [id number]  Date of Treatment: 05/31/2017   Diagnosis:   Encounter Diagnoses   Name Primary?    Risk for falls     Osteoporosis, unspecified osteoporosis type, unspecified pathological fracture presence        Visit number: 21  Start date: 12/8/16    Precautions: HTN, decreased balance, R ANKIT in 2015 in [place]    Subjective:   Judy states her R knee has been feeling better since getting injections.  States she has one more planned for tomorrow, and then will be out of town for two weeks beginning this coming weekend.     Objective:     Dynamic gait index ~ 20/30  (prior)    Judy instructed in and performed therapeutic exercises to develop strength, endurance, flexibility, posture and core stabilization x 60 minutes including:     Rec bike (seat 7) level 2 x 8 min    Supine:    Active Hamstring stretch Orion 5" x 10 each  R adductor stretch (passive)   Psoas stretch off EOB x 60"  Hip add squeeze with ball 2x10 + bridge x10   Hip abd with RTB + bridge 3 x 10       Prone: (not performed)  quad stretch x 60"   anterior hip PA mobs (R)   Hip ext 2 x 10 (R)      Quadruped:  rock back with PPT x 15 (NP)     S/L:   Clamshells with YTB 3 x 10 R   Hip abduction with YTB 2 x 10 R     Shuttle:  2 bands, Orion squat with Hip add teena using ball x 3'      Standing:   Calf stretch on slant board x 2'   Calf raises with ecc lowering 2"/4" 2 x 10   Lateral step ups/hip hikes on R w/ 2in block 3 x 10      Written Home Exercises Provided: to continue with current written HEP with improved compliance.  Pt demonstrated good understanding of education provided with good return demonstration of exercises.      Assessment:   Pt continues with overall good tolerance to treatment session. She presents with continued tightness of B gastroc, HS, and hip flexors. LE fatigue with prolonged standing exercises. Strength very slowly improving yet pt continues with core and LE weakness, R > L. Patient will continue to " benefit from progressive strength and stability training as tolerated.    Pt will continue to benefit from skilled PT intervention. Medical Necessity is demonstrated by:  Fall Risk, Pain limits function of effected part for some activities, Unable to participate fully in daily activities, Requires skilled supervision to complete and progress HEP and Weakness.     GOALS:  8 weeks, pt agrees to goals set:   1. Pt will ambulate 15-20 ft on grass with/without SC mod I ~MET   2. Pt will be independent with HEP. ~progessing   3. Pt will increase B LE strength by 1/3 muscle grade in all deficient planes for increased ease with stairs.~ 4/5 hip abduction     4. Pt will be able to perform > or = 10 chair rises without UE support in order to demonstrate improved LE strength and endurance.    5. Dynamic Gait index > 20/30 for decreased fall risk ~progressing   6. Increased posterior and lateral R hip strength 4/5 or greater for improved gait stability ~MET       Plan:  Extend PT POC 1 x per week for 6-8 weeks as pt will be out of town for 2 weeks.      Safia Rodriguez

## 2017-06-22 ENCOUNTER — HOSPITAL ENCOUNTER (OUTPATIENT)
Dept: RADIOLOGY | Facility: HOSPITAL | Age: 82
Discharge: HOME | End: 2017-06-22
Attending: INTERNAL MEDICINE
Payer: COMMERCIAL

## 2017-06-22 DIAGNOSIS — M81.0 OSTEOPOROSIS, SENILE: ICD-10-CM

## 2017-06-22 DIAGNOSIS — C50.911 BREAST CANCER, RIGHT: ICD-10-CM

## 2017-06-22 DIAGNOSIS — S72.001A HIP FRACTURE, RIGHT, CLOSED, INITIAL ENCOUNTER: ICD-10-CM

## 2017-06-22 DIAGNOSIS — Z12.31 VISIT FOR SCREENING MAMMOGRAM: ICD-10-CM

## 2017-06-22 DIAGNOSIS — C50.911 MALIGNANT NEOPLASM OF RIGHT BREAST: ICD-10-CM

## 2017-06-22 PROCEDURE — 77067 SCR MAMMO BI INCL CAD: CPT | Mod: TC

## 2017-06-22 PROCEDURE — 77067 SCR MAMMO BI INCL CAD: CPT | Mod: 26,,, | Performed by: RADIOLOGY

## 2017-06-22 PROCEDURE — 77063 BREAST TOMOSYNTHESIS BI: CPT | Mod: 26,,, | Performed by: RADIOLOGY

## 2017-06-23 ENCOUNTER — OFFICE VISIT (OUTPATIENT)
Dept: FAMILY MEDICINE | Facility: CLINIC | Age: 82
End: 2017-06-23
Payer: MEDICARE

## 2017-06-23 VITALS
DIASTOLIC BLOOD PRESSURE: 60 MMHG | SYSTOLIC BLOOD PRESSURE: 150 MMHG | WEIGHT: 134.94 LBS | OXYGEN SATURATION: 96 % | HEART RATE: 60 BPM | BODY MASS INDEX: 23.04 KG/M2 | HEIGHT: 64 IN | TEMPERATURE: 98 F

## 2017-06-23 DIAGNOSIS — I10 ESSENTIAL HYPERTENSION: ICD-10-CM

## 2017-06-23 DIAGNOSIS — F41.9 ANXIETY: Primary | ICD-10-CM

## 2017-06-23 PROCEDURE — 1126F AMNT PAIN NOTED NONE PRSNT: CPT | Mod: S$GLB,,, | Performed by: NURSE PRACTITIONER

## 2017-06-23 PROCEDURE — 99214 OFFICE O/P EST MOD 30 MIN: CPT | Mod: S$GLB,,, | Performed by: NURSE PRACTITIONER

## 2017-06-23 PROCEDURE — 99999 PR PBB SHADOW E&M-EST. PATIENT-LVL IV: CPT | Mod: PBBFAC,,, | Performed by: NURSE PRACTITIONER

## 2017-06-23 PROCEDURE — 1159F MED LIST DOCD IN RCRD: CPT | Mod: S$GLB,,, | Performed by: NURSE PRACTITIONER

## 2017-06-23 RX ORDER — DIAZEPAM 5 MG/1
5 TABLET ORAL 2 TIMES DAILY PRN
Qty: 30 TABLET | Refills: 2 | Status: SHIPPED | OUTPATIENT
Start: 2017-06-23 | End: 2017-09-25 | Stop reason: SDUPTHER

## 2017-06-23 RX ORDER — AMLODIPINE BESYLATE 2.5 MG/1
2.5 TABLET ORAL DAILY
Qty: 30 TABLET | Refills: 2 | Status: SHIPPED | OUTPATIENT
Start: 2017-06-23 | End: 2017-09-11 | Stop reason: SDUPTHER

## 2017-06-23 NOTE — PROGRESS NOTES
Subjective:       Patient ID: Judy Thompson is a 84 y.o. female.    Chief Complaint: Medication Refill    HPI     Patient presents to clinic for f/u of anxiety. She is maintained on Valium 5 mg daily PRN with relief.     HTN - she is maintained Lotensin 40 mg daily. Her b/p is elevated in clinic, reports that she monitors her b/p occasionally at home and notes it to range near 150s-160s/80s.    Review of Systems   Constitutional: Negative for chills and fever.   Respiratory: Negative for cough and shortness of breath.    Cardiovascular: Negative for chest pain and palpitations.   Skin: Negative for rash and wound.   Neurological: Negative for dizziness and headaches.   Psychiatric/Behavioral: Positive for dysphoric mood. Negative for sleep disturbance. The patient is nervous/anxious.        Objective:      Physical Exam   Constitutional: She is oriented to person, place, and time. She appears well-developed and well-nourished.   HENT:   Head: Normocephalic and atraumatic.   Cardiovascular: Normal rate, regular rhythm and normal heart sounds.    No murmur heard.  Pulmonary/Chest: Effort normal and breath sounds normal. No respiratory distress. She has no wheezes. She has no rales.   Musculoskeletal: Normal range of motion. She exhibits no edema, tenderness or deformity.   Neurological: She is alert and oriented to person, place, and time. No cranial nerve deficit.   Skin: Skin is warm and dry.   Psychiatric: She has a normal mood and affect. Her behavior is normal.   Nursing note and vitals reviewed.      Assessment:       1. Anxiety    2. Essential hypertension        Plan:   Judy was seen today for medication refill.    Diagnoses and all orders for this visit:    Anxiety  -     VALIUM 5 mg tablet; Take 1 tablet (5 mg total) by mouth 2 (two) times daily as needed.  Side effects and precautions of medication use reviewed with patient, expressed understanding. No questions or concerns.  Self-care, sx monitoring, and  counseling reviewed. Patient receptive to discussion.     Essential hypertension  -     amlodipine (NORVASC) 2.5 MG tablet; Take 1 tablet (2.5 mg total) by mouth once daily.  -     Lipid panel; Future  Continue Benazepril 40 mg daily. Add norvasc daily. Home b/p monitoring.   RTC in 1 month for re-evaluation.

## 2017-07-17 ENCOUNTER — HOSPITAL ENCOUNTER (OUTPATIENT)
Dept: RADIOLOGY | Facility: HOSPITAL | Age: 82
Discharge: HOME | End: 2017-07-17
Attending: INTERNAL MEDICINE
Payer: COMMERCIAL

## 2017-07-17 DIAGNOSIS — S72.001A HIP FRACTURE, RIGHT, CLOSED, INITIAL ENCOUNTER: ICD-10-CM

## 2017-07-17 DIAGNOSIS — M81.0 OSTEOPOROSIS, SENILE: ICD-10-CM

## 2017-07-17 DIAGNOSIS — C50.911 BREAST CANCER, RIGHT: ICD-10-CM

## 2017-07-17 PROCEDURE — 71020 XR CHEST PA AND LATERAL: CPT | Mod: 26,,, | Performed by: RADIOLOGY

## 2017-07-17 PROCEDURE — 71020 XR CHEST PA AND LATERAL: CPT | Mod: TC,PO

## 2017-07-24 ENCOUNTER — INFUSION (OUTPATIENT)
Dept: INFUSION THERAPY | Facility: HOSPITAL | Age: 82
End: 2017-07-24
Attending: NURSE PRACTITIONER
Payer: COMMERCIAL

## 2017-07-24 ENCOUNTER — OFFICE VISIT (OUTPATIENT)
Dept: HEMATOLOGY/ONCOLOGY | Facility: CLINIC | Age: 82
End: 2017-07-24
Payer: COMMERCIAL

## 2017-07-24 VITALS
HEIGHT: 63 IN | RESPIRATION RATE: 16 BRPM | HEIGHT: 63 IN | HEART RATE: 65 BPM | RESPIRATION RATE: 16 BRPM | WEIGHT: 134.5 LBS | HEART RATE: 65 BPM | WEIGHT: 134.5 LBS | SYSTOLIC BLOOD PRESSURE: 133 MMHG | DIASTOLIC BLOOD PRESSURE: 65 MMHG | OXYGEN SATURATION: 96 % | TEMPERATURE: 98 F | TEMPERATURE: 98 F | DIASTOLIC BLOOD PRESSURE: 65 MMHG | BODY MASS INDEX: 23.83 KG/M2 | BODY MASS INDEX: 23.83 KG/M2 | SYSTOLIC BLOOD PRESSURE: 133 MMHG

## 2017-07-24 DIAGNOSIS — Z17.0 MALIGNANT NEOPLASM OF RIGHT BREAST IN FEMALE, ESTROGEN RECEPTOR POSITIVE, UNSPECIFIED SITE OF BREAST: Primary | ICD-10-CM

## 2017-07-24 DIAGNOSIS — C50.911 MALIGNANT NEOPLASM OF RIGHT BREAST IN FEMALE, ESTROGEN RECEPTOR POSITIVE, UNSPECIFIED SITE OF BREAST: Primary | ICD-10-CM

## 2017-07-24 DIAGNOSIS — M81.0 AGE-RELATED OSTEOPOROSIS WITHOUT CURRENT PATHOLOGICAL FRACTURE: ICD-10-CM

## 2017-07-24 DIAGNOSIS — M81.0 AGE RELATED OSTEOPOROSIS, UNSPECIFIED PATHOLOGICAL FRACTURE PRESENCE: Primary | ICD-10-CM

## 2017-07-24 DIAGNOSIS — I10 ESSENTIAL HYPERTENSION: ICD-10-CM

## 2017-07-24 DIAGNOSIS — S72.001A HIP FRACTURE, RIGHT, CLOSED, INITIAL ENCOUNTER: ICD-10-CM

## 2017-07-24 PROCEDURE — 1159F MED LIST DOCD IN RCRD: CPT | Mod: S$GLB,,, | Performed by: NURSE PRACTITIONER

## 2017-07-24 PROCEDURE — 99214 OFFICE O/P EST MOD 30 MIN: CPT | Mod: S$GLB,,, | Performed by: NURSE PRACTITIONER

## 2017-07-24 PROCEDURE — 1126F AMNT PAIN NOTED NONE PRSNT: CPT | Mod: S$GLB,,, | Performed by: NURSE PRACTITIONER

## 2017-07-24 PROCEDURE — 63600175 PHARM REV CODE 636 W HCPCS: Mod: PN | Performed by: INTERNAL MEDICINE

## 2017-07-24 PROCEDURE — 99999 PR PBB SHADOW E&M-EST. PATIENT-LVL V: CPT | Mod: PBBFAC,,, | Performed by: NURSE PRACTITIONER

## 2017-07-24 PROCEDURE — 96401 CHEMO ANTI-NEOPL SQ/IM: CPT | Mod: PN

## 2017-07-24 RX ADMIN — DENOSUMAB 60 MG: 60 INJECTION SUBCUTANEOUS at 02:07

## 2017-07-24 NOTE — PROGRESS NOTES
HISTORY OF PRESENT ILLNESS:  This is an 84-year-old white female known to Dr. 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    The patient is status post lumpectomy, post-lumpectomy irradiation and five   years of adjuvant Arimidex/Zometa.  The patient has had bilateral breast   augmentation following surgery and has been out of treatment over ten years.    The patient also carries a diagnosis for melanoma which was widely resected   in her right lower extremity.  The patient had carried a diagnosis of   osteopenia with high risk for fracture.  She suffered a fall while on a cruise   in April 2015 and sustained a right hip fracture.  Her diagnosis was   subsequently advanced to osteoporosis.  The patient presents to the clinic today   for her annual breast evaluation and next Prolia injection.  She remains    compliant in taking calcium and vitamin D supplementation.  She denies any   invasive dental procedures in the last three months nor plans for the future.    She denies any recent fevers, chills, cold, cough, congestion, bleeding,   dysuria, breast concerns, chest pain, abdominal discomfort, nausea, diarrhea,etc.   No other new complaints or pertinent findings on a 14-point review of systems.    PHYSICAL EXAMINATION:  GENERAL:  Well-developed, elderly white female in no acute distress.  Alert and   oriented x4.  VITAL SIGNS:  Weight 134.5 pounds (decrease of 4 pounds in six months),   BP 1133/65, pulse 65 and regular, respirations 16, temperature 98.0.  HEENT:  Normocephalic, atraumatic.  Oral mucosa pink and moist.  Lips without   lesions.  Tongue midline.  Oropharynx clear.  Nonicteric sclerae.  NECK:  Supple.  No adenopathy.  HEART:  Regular rate and rhythm without murmur, gallop or rub.  LUNGS:  Clear to auscultation bilaterally.  ABDOMEN:  Soft, nontender and nondistended with positive normoactive bowel   sounds.  No hepatosplenomegaly.  EXTREMITIES:  No cyanosis, clubbing  or edema.  Distal pulses are intact.  AXILLAE AND GROIN:  No palpable pathologic lymphadenopathy is appreciated.        SKIN:  Intact/turgor normal                                                       NEUROLOGIC:  Cranial nerves II-XII grossly intact.  Motor:  Good muscle bulk and   tone.  Strength/sensory 5/5 throughout.  Gait stable.   BREASTS: Bilateral reconstruction with no signs of reoccurrence.    LABORATORY:    Lab Results   Component Value Date    WBC 5.91 07/17/2017    HGB 13.1 07/17/2017    HCT 37.4 07/17/2017    MCV 93 07/17/2017     07/17/2017     Unremarkable differential    CMP  Sodium   Date Value Ref Range Status   07/17/2017 135 (L) 136 - 145 mmol/L Final     Potassium   Date Value Ref Range Status   07/17/2017 4.1 3.5 - 5.1 mmol/L Final     Chloride   Date Value Ref Range Status   07/17/2017 99 95 - 110 mmol/L Final     CO2   Date Value Ref Range Status   07/17/2017 25 23 - 29 mmol/L Final     Glucose   Date Value Ref Range Status   07/17/2017 83 70 - 110 mg/dL Final     BUN, Bld   Date Value Ref Range Status   07/17/2017 13 8 - 23 mg/dL Final     Creatinine   Date Value Ref Range Status   07/17/2017 0.7 0.5 - 1.4 mg/dL Final     Calcium   Date Value Ref Range Status   07/17/2017 8.9 8.7 - 10.5 mg/dL Final     Total Protein   Date Value Ref Range Status   07/17/2017 7.2 6.0 - 8.4 g/dL Final     Albumin   Date Value Ref Range Status   07/17/2017 4.1 3.5 - 5.2 g/dL Final     Total Bilirubin   Date Value Ref Range Status   07/17/2017 0.5 0.1 - 1.0 mg/dL Final     Comment:     For infants and newborns, interpretation of results should be based  on gestational age, weight and in agreement with clinical  observations.  Premature Infant recommended reference ranges:  Up to 24 hours.............<8.0 mg/dL  Up to 48 hours............<12.0 mg/dL  3-5 days..................<15.0 mg/dL  6-29 days.................<15.0 mg/dL       Alkaline Phosphatase   Date Value Ref Range Status   07/17/2017 37 (L)  55 - 135 U/L Final     AST   Date Value Ref Range Status   07/17/2017 33 10 - 40 U/L Final     ALT   Date Value Ref Range Status   07/17/2017 29 10 - 44 U/L Final     Anion Gap   Date Value Ref Range Status   07/17/2017 11 8 - 16 mmol/L Final     eGFR if    Date Value Ref Range Status   07/17/2017 >60 >60 mL/min/1.73 m^2 Final     eGFR if non    Date Value Ref Range Status   07/17/2017 >60 >60 mL/min/1.73 m^2 Final     Comment:     Calculation used to obtain the estimated glomerular filtration  rate (eGFR) is the CKD-EPI equation. Since race is unknown   in our information system, the eGFR values for   -American and Non--American patients are given   for each creatinine result.           RADIOLOGY:  CXR dated 07/17/17:  Impression:  Chronic findings of right basilar fibrosis and mild cardiomegaly   with no evidence of active chest disease.    MAMMOGRAM:  Dated 06/22/17:  No mammographic evidence of malignancy.  BI-RADS Category 1:  Negative.  F/u mammogram in 1 year is recommended for both breasts.    IMPRESSION:  1.  Osteoporosis.  2.  Status post right hip fracture in April 2015.  3.  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  4.  Completely resected malignant melanoma involving the right lower extremity.    PLAN:  1.  Proceed with Prolia 60 mg subQ today.  2.  Continue taking calcium and vitamin D supplementation and notify the office for any need of invasive dental work.  3.  Return to clinic in six months with interval BMP, Vitamin D prior to next Prolia.  4.  Return to clinic in one year for annual breast/Prolia with interval CBC, CMP, LDH, chest x-ray, mammogram, and bone mineral density (on or after   06/22/2018).    Assessment/plan reviewed and approved by Dr. Santizo.

## 2017-07-24 NOTE — PATIENT INSTRUCTIONS
Denosumab injection  What is this medicine?  DENOSUMAB (den oh hadley mab) slows bone breakdown. Prolia is used to treat osteoporosis in women after menopause and in men. Xgeva is used to prevent bone fractures and other bone problems caused by cancer bone metastases. Xgeva is also used to treat giant cell tumor of the bone.  How should I use this medicine?  This medicine is for injection under the skin. It is given by a health care professional in a hospital or clinic setting.  If you are getting Prolia, a special MedGuide will be given to you by the pharmacist with each prescription and refill. Be sure to read this information carefully each time.  For Prolia, talk to your pediatrician regarding the use of this medicine in children. Special care may be needed. For Xgeva, talk to your pediatrician regarding the use of this medicine in children. While this drug may be prescribed for children as young as 13 years for selected conditions, precautions do apply.  What side effects may I notice from receiving this medicine?  Side effects that you should report to your doctor or health care professional as soon as possible:  · allergic reactions like skin rash, itching or hives, swelling of the face, lips, or tongue  · breathing problems  · chest pain  · fast, irregular heartbeat  · feeling faint or lightheaded, falls  · fever, chills, or any other sign of infection  · muscle spasms, tightening, or twitches  · numbness or tingling  · skin blisters or bumps, or is dry, peels, or red  · slow healing or unexplained pain in the mouth or jaw  · unusual bleeding or bruising  Side effects that usually do not require medical attention (Report these to your doctor or health care professional if they continue or are bothersome.):  · muscle pain  · stomach upset, gas  What may interact with this medicine?  Do not take this medicine with any of the following medications:  · other medicines containing denosumab  This medicine may also  interact with the following medications:  · medicines that suppress the immune system  · medicines that treat cancer  · steroid medicines like prednisone or cortisone  What if I miss a dose?  It is important not to miss your dose. Call your doctor or health care professional if you are unable to keep an appointment.  Where should I keep my medicine?  This medicine is only given in a clinic, doctor's office, or other health care setting and will not be stored at home.  What should I tell my health care provider before I take this medicine?  They need to know if you have any of these conditions:  · dental disease  · eczema  · infection or history of infections  · kidney disease or on dialysis  · low blood calcium or vitamin D  · malabsorption syndrome  · scheduled to have surgery or tooth extraction  · taking medicine that contains denosumab  · thyroid or parathyroid disease  · an unusual reaction to denosumab, other medicines, foods, dyes, or preservatives  · pregnant or trying to get pregnant  · breast-feeding  What should I watch for while using this medicine?  Visit your doctor or health care professional for regular checks on your progress. Your doctor or health care professional may order blood tests and other tests to see how you are doing.  Call your doctor or health care professional if you get a cold or other infection while receiving this medicine. Do not treat yourself. This medicine may decrease your body's ability to fight infection.  You should make sure you get enough calcium and vitamin D while you are taking this medicine, unless your doctor tells you not to. Discuss the foods you eat and the vitamins you take with your health care professional.  See your dentist regularly. Brush and floss your teeth as directed. Before you have any dental work done, tell your dentist you are receiving this medicine.  Do not become pregnant while taking this medicine or for 5 months after stopping it. Women should  inform their doctor if they wish to become pregnant or think they might be pregnant. There is a potential for serious side effects to an unborn child. Talk to your health care professional or pharmacist for more information.  Date Last Reviewed:   NOTE:This sheet is a summary. It may not cover all possible information. If you have questions about this medicine, talk to your doctor, pharmacist, or health care provider. Copyright© 2016 [employer]        Preventing Falls in the Home  As you get older, falls are more likely for many reasons. One reason is because your reaction time slows. Your muscles and joints may also get stiffer, making them less flexible and therefore more prone to injury. Illness, medicines, and vision changes can also affect your balance. This increases your risk of falling. A fall could leave you unable to live on your own. To make your home safer, follow these tips:   Floors  Make floors safer by doing the following:   · Put nonskid pads under area rugs.  · Remove throw rugs.  · Replace worn floor coverings.  · Tack carpets firmly to each step on carpeted stairs. Put nonskid strips on the edges of uncarpeted stairs.  · Keep floors and stairs free of clutter and cords.  · Arrange furniture so there are clear pathways.  · Clean up any spills right away.  Bathrooms  Make bathrooms safer by doing the following:   ·   Install grab bars in the tub or shower.  · Apply nonskid strips or put a nonskid rubber mat in the tub or shower.  · Sit on a bath chair to bathe.  · Use bathmats with nonskid backing.  Lighting  Tips to light your way:   · Keep a flashlight in each room.  · Put a nightlight along the pathway between the bedroom and the bathroom.  Date Last Reviewed: 8/1/2016  © 2403-5114 [employer]. [address] [place] [address], [place] [address]. All rights reserved. This information is not intended as a substitute for professional medical care. Always follow your healthcare  "professional's instructions.        Preventing Falls: Are You At Risk of Falling?  As you get older, you're not as steady on your feet as you once were. And you may have health problems you didn't have when you were younger. So, it's not surprising that older people are more likely to trip and fall. Falling can be very serious. It can change your overall health and quality of life. That's why it's important to be aware of your own risk of falling.    The dangers of falling  Falls are one of the main causes of injury in people over age 65. An older person who falls may take longer to get better than a younger person. And, after a fall, an older person is more likely to have problems that don't go away. So, preventing falls can help you avoid serious health problems.  Are you at risk of falling?  Answer these questions to rate your level of risk.  · Are you a woman?  · Have you fallen or stumbled in the last year?  · Are you over age 65?  · Are you ever dizzy or lightheaded with standing?  · Do you have a hard time getting in and out of the bathtub or on and off the toilet?  · Do you lean on objects to help you get around? Or do you use a cane or walker?  · Do you have vision or hearing problems? For example, do you need new glasses or hearing aids?  · Do you have 2 or more long-lasting (chronic) medical conditions?  · Do you take 3 or more medicines?  · Have you felt depressed recently?  · Have you had more trouble with your memory in recent months?  · Are there hazards in your home that might cause you to fall, such as loose rugs or poor lighting?  · Do you have a pet that jumps on you or might trip you?  · Have you stopped getting regular exercise?  · Do you have diabetes?   · Do you have a neurologic disease, such as Parkinson or Alzheimer disease?   · Do you drink alcohol?  · Do you wear athletic shoes or slippers, or go barefoot at home?  You can help prevent falls  If you answered "yes" to any of the above " questions, you should take steps to reduce your risk of a fall. Monitoring health conditions and keeping walkways in your home free of clutter are just 2 ways. Changing is sometimes easier said than done. But keep in mind that even small changes can make you less likely to fall.  The fear of falling  It's normal to be scared of falling, especially if you've fallen before. But being afraid can actually make you more likely to fall. This is because:  · Fear might cause you to become less active. Being less active can lead to a loss of strength and balance.  · Fear can lead to isolation from others, depression, or the use of more medicines or alcohol. And all these things make falling even more likely.  To break the cycle, learn more about ways to avoid falling. As you take control, you may find yourself feeling less afraid.   Date Last Reviewed: 6/12/2015  © 9329-5025 [x+1]. [address] [place] [address], [place] [address]. All rights reserved. This information is not intended as a substitute for professional medical care. Always follow your healthcare professional's instructions.        Exercises to Prevent Falls  Certain types of exercises may help make you less likely to fall. Try the ones below. Or do other exercises that your health care provider suggests. Depending on your health, you may need to start slowly. Don't let that stop you. Even small amounts of exercise can help you. Be sure to talk to your health care provider before starting any exercise program.    Improve balance  Many types of exercise can help improve balance. Edwardo chi and yoga are good examples. Here's another one to try. You can do it anytime and almost anywhere.  · Stand next to a counter or solid support.  · Push yourself up onto your tiptoes.  · Hold for 5 seconds. If you start to lose your balance, hold on to the counter.  · Rest and repeat 5 times. Work up to holding for 20 to 30 seconds, if you can.    Increase  "flexibility  Being more flexible makes it easier for you to move around safely. Try exercises like the seated hamstring stretch.  · Sit in a chair and put one foot on a stool.  · Straighten your leg and reach with both hands down either side of your leg. Reach as far down your leg as you can.  · Hold for about 20 seconds.  · Go back to the starting position. Then repeat 5 times. Switch legs.    Build strength  "Resistance" exercises help build strength. You can do them without equipment. Or you can use weights, elastic bands, or special machines. One such exercise is called the biceps curl. You can hold a 1-pound weight or even a can of soup. Do this exercise at least 3 times a week. Strive for every day.  · Sit up straight in a chair.  · Keep your elbow close to your body and your wrist straight.  · Bend your arm, moving your hand up to your shoulder. Then slowly lower your arm.  · Repeat 5 times. Switch to the other arm.    Build your staying power  Aerobic exercises make your heart and lungs stronger so you can keep moving longer. Walking and swimming are two of the best types of exercises you can do. Using a stationary bike is great, too. Find an aerobic exercise that you enjoy. Start slowly and build up. Even 5 minutes is helpful. Aim for a goal of 30 minutes, at least 3 times a week. You don't have to do 30 minutes in 1 session. Break it up and walk a little throughout the day.     More helpful tips  · Start easy. Slowly work up to doing more.  · Talk with your health care provider about the best exercises for you.  · Call senior centers or health clubs about exercise programs.  · If needed, have a family member watch you walk every so often to check your stability.  · Exercise with a friend. Choose an activity you both enjoy.  · Consider anita chi or yoga to strengthen your balance.  · Try exercises that you can do anytime, anywhere. Here are 2 examples. Have someone with you when you first try " these:  ¨ Practice walking by placing 1 foot right in front of the other.  ¨ Stand up and sit down 10 times. Repeat this throughout the day.   Date Last Reviewed: 6/13/2015  © 5695-5496 [employer]. [address] [place] [address], [place] [address]. All rights reserved. This information is not intended as a substitute for professional medical care. Always follow your healthcare professional's instructions.

## 2017-07-24 NOTE — PLAN OF CARE
Problem: Patient Care Overview  Goal: Plan of Care Review  Outcome: Ongoing (interventions implemented as appropriate)  Pt tolerated injection well without complaints. D/C to home with steady gait. Using cane.

## 2017-08-07 ENCOUNTER — TELEPHONE (OUTPATIENT)
Dept: HEMATOLOGY/ONCOLOGY | Facility: CLINIC | Age: 82
End: 2017-08-07

## 2017-08-07 NOTE — TELEPHONE ENCOUNTER
----- Message from Leona Alston sent at 8/7/2017 10:10 AM CDT -----  Contact: Darlene-friend  Call connected to pod.  Patient having reaction to shot, in extreme pain where shot was given.

## 2017-08-07 NOTE — TELEPHONE ENCOUNTER
Returned call, spoke with friend Sakshi who identifies herself as a [facility] nurse ([phone]) who was at patient's home and has discovered that patient is not taking Duexis as prescribed and has recently run out of of it. Sakshi states that patient now states pain is radiating from shoulder to hip . Discussed with Sakshi and Sakshi agrees that noncompliance with prescribed medication and now being out of that medication for arthritic pain has complicated the patient's condition and complaint of joint pain. Sakshi will investigate why the patient is out of medication and contact appropriate physicians office, if needed.

## 2017-08-08 ENCOUNTER — TELEPHONE (OUTPATIENT)
Dept: HEMATOLOGY/ONCOLOGY | Facility: CLINIC | Age: 82
End: 2017-08-08

## 2017-08-08 NOTE — TELEPHONE ENCOUNTER
I rev'd and Dr Panchal is not available at the time daughter can be here, since she has to drive from [place]. Made appt within 24 hour period with Dr Santizo for tomorrw. She agreed to this. Cm

## 2017-08-08 NOTE — TELEPHONE ENCOUNTER
----- Message from Edis Gruber sent at 8/8/2017  8:46 AM CDT -----  Contact: Judy Moseley is looking to speak with Dr. Santizo. She did not want to give me any details. Please call back .[phone]. Thanks!

## 2017-08-08 NOTE — TELEPHONE ENCOUNTER
Daughter in [place] and is coming back today and wants pt seen today for problem with her Prolia shot. Told her Dr Santizo has no openings, but I will discuss with his associate provider and see if she will see her mother today. Needs appt for 3pm. Told her I would call her back. Number is [phone] cm

## 2017-08-08 NOTE — TELEPHONE ENCOUNTER
This patient was contacted regarding another phone message today by Sheila Barker LPN and has an appointment tomorrow to see Dr. Santizo.

## 2017-08-09 ENCOUNTER — OFFICE VISIT (OUTPATIENT)
Dept: HEMATOLOGY/ONCOLOGY | Facility: CLINIC | Age: 82
End: 2017-08-09
Payer: COMMERCIAL

## 2017-08-09 VITALS
HEART RATE: 68 BPM | BODY MASS INDEX: 23.36 KG/M2 | HEIGHT: 63 IN | RESPIRATION RATE: 18 BRPM | DIASTOLIC BLOOD PRESSURE: 76 MMHG | TEMPERATURE: 98 F | WEIGHT: 131.81 LBS | SYSTOLIC BLOOD PRESSURE: 184 MMHG

## 2017-08-09 DIAGNOSIS — M81.0 OSTEOPOROSIS, SENILE: ICD-10-CM

## 2017-08-09 DIAGNOSIS — M54.10 RADICULOPATHY, UNSPECIFIED SPINAL REGION: ICD-10-CM

## 2017-08-09 DIAGNOSIS — C50.911 MALIGNANT NEOPLASM OF RIGHT BREAST IN FEMALE, ESTROGEN RECEPTOR POSITIVE, UNSPECIFIED SITE OF BREAST: Primary | ICD-10-CM

## 2017-08-09 DIAGNOSIS — Z17.0 MALIGNANT NEOPLASM OF RIGHT BREAST IN FEMALE, ESTROGEN RECEPTOR POSITIVE, UNSPECIFIED SITE OF BREAST: Primary | ICD-10-CM

## 2017-08-09 PROCEDURE — 3078F DIAST BP <80 MM HG: CPT | Mod: S$GLB,,, | Performed by: INTERNAL MEDICINE

## 2017-08-09 PROCEDURE — 1125F AMNT PAIN NOTED PAIN PRSNT: CPT | Mod: S$GLB,,, | Performed by: INTERNAL MEDICINE

## 2017-08-09 PROCEDURE — 99999 PR PBB SHADOW E&M-EST. PATIENT-LVL III: CPT | Mod: PBBFAC,,, | Performed by: INTERNAL MEDICINE

## 2017-08-09 PROCEDURE — 3077F SYST BP >= 140 MM HG: CPT | Mod: S$GLB,,, | Performed by: INTERNAL MEDICINE

## 2017-08-09 PROCEDURE — 99213 OFFICE O/P EST LOW 20 MIN: CPT | Mod: S$GLB,,, | Performed by: INTERNAL MEDICINE

## 2017-08-09 PROCEDURE — 1159F MED LIST DOCD IN RCRD: CPT | Mod: S$GLB,,, | Performed by: INTERNAL MEDICINE

## 2017-08-09 PROCEDURE — 3008F BODY MASS INDEX DOCD: CPT | Mod: S$GLB,,, | Performed by: INTERNAL MEDICINE

## 2017-08-09 NOTE — PROGRESS NOTES
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  The patient also suffers from senile osteoporosis and receives   biannual Prolia injection.  She recently received same and has had difficulty   with pain radiating down her left scapula, posterior shoulder and posterior   aspect of her upper extremity.  This has been unrelieved by Advil and other   conservative therapies.  The pain is interfering with the patient's ADLs as she   is unable to drive and do routine housework.  No other pertinent findings on a   10-point review of systems.    PHYSICAL EXAMINATION:  GENERAL:  Well-developed, well-nourished elderly white female in no acute   distress, who is hard of hearing.  VITAL SIGNS:  Weight of 131-1/2 pounds (decreased by 3 pounds).  HEENT:  Normocephalic, atraumatic.  Oral mucosa pink and moist.  Lips without   lesions.  Tongue midline.  Oropharynx clear.  Nonicteric sclerae.  NECK:  Supple.  No adenopathy.  HEART:  Regular rate and rhythm without murmur, gallop or rub.  LUNGS:  Clear to auscultation bilaterally.  ABDOMEN:  Soft, nontender and nondistended with positive normoactive bowel   sounds.  No hepatosplenomegaly.  EXTREMITIES:  No cyanosis, clubbing or edema.  Distal pulses are intact.  I   cannot find the injection site within the left shoulder.  There is no associated   mass lesion, swelling, erythema, or warmth.    IMPRESSION:  1.  Possible cervical radiculopathy.  2.  History of stage I invasive right breast carcinoma.  3.  Senile osteoporosis.    PLAN:  Obtain MRI of the cervical spine with and without contrast and review   results by phone.  Otherwise, keep previously scheduled followup.      NISHI/EDMOND  dd: 08/09/2017 11:32:44 (CDT)  td: 08/09/2017 20:11:10 (CDT)  Doc ID   #[id number]  Job ID #[id number]    CC:

## 2017-08-10 ENCOUNTER — HOSPITAL ENCOUNTER (OUTPATIENT)
Dept: RADIOLOGY | Facility: HOSPITAL | Age: 82
Discharge: HOME | End: 2017-08-10
Attending: INTERNAL MEDICINE
Payer: COMMERCIAL

## 2017-08-10 DIAGNOSIS — Z17.0 MALIGNANT NEOPLASM OF RIGHT BREAST IN FEMALE, ESTROGEN RECEPTOR POSITIVE, UNSPECIFIED SITE OF BREAST: ICD-10-CM

## 2017-08-10 DIAGNOSIS — M54.10 RADICULOPATHY, UNSPECIFIED SPINAL REGION: ICD-10-CM

## 2017-08-10 DIAGNOSIS — C50.911 MALIGNANT NEOPLASM OF RIGHT BREAST IN FEMALE, ESTROGEN RECEPTOR POSITIVE, UNSPECIFIED SITE OF BREAST: ICD-10-CM

## 2017-08-10 DIAGNOSIS — M81.0 OSTEOPOROSIS, SENILE: ICD-10-CM

## 2017-08-10 PROCEDURE — A9585 GADOBUTROL INJECTION: HCPCS | Mod: PO | Performed by: INTERNAL MEDICINE

## 2017-08-10 PROCEDURE — 25500020 PHARM REV CODE 255: Mod: PO | Performed by: INTERNAL MEDICINE

## 2017-08-10 PROCEDURE — 72156 MRI NECK SPINE W/O & W/DYE: CPT | Mod: TC,PO

## 2017-08-10 PROCEDURE — 72156 MRI NECK SPINE W/O & W/DYE: CPT | Mod: 26,,, | Performed by: RADIOLOGY

## 2017-08-10 RX ORDER — GADOBUTROL 604.72 MG/ML
5 INJECTION INTRAVENOUS
Status: COMPLETED | OUTPATIENT
Start: 2017-08-10 | End: 2017-08-10

## 2017-08-10 RX ADMIN — GADOBUTROL 5 ML: 604.72 INJECTION INTRAVENOUS at 01:08

## 2017-08-11 ENCOUNTER — TELEPHONE (OUTPATIENT)
Dept: HEMATOLOGY/ONCOLOGY | Facility: CLINIC | Age: 82
End: 2017-08-11

## 2017-08-11 DIAGNOSIS — M54.9 SPINE PAIN: Primary | ICD-10-CM

## 2017-08-11 NOTE — TELEPHONE ENCOUNTER
----- Message from Celina Gentile sent at 8/11/2017  8:40 AM CDT -----  Call [phone] returning call

## 2017-08-11 NOTE — TELEPHONE ENCOUNTER
----- Message from Kristopher Santizo MD sent at 8/11/2017  5:52 AM CDT -----  No evidence of metastasis or effect of recent prolia inj to explain RUE pain.  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  Send to [facility] for eval and rx.

## 2017-09-11 ENCOUNTER — TELEPHONE (OUTPATIENT)
Dept: PODIATRY | Facility: CLINIC | Age: 82
End: 2017-09-11

## 2017-09-11 DIAGNOSIS — I10 ESSENTIAL HYPERTENSION: ICD-10-CM

## 2017-09-11 NOTE — TELEPHONE ENCOUNTER
----- Message from Marlo Han sent at 9/11/2017 11:04 AM CDT -----  Contact: patient  Patient called requesting a latter appointment for tomorrow. Offered appointment patient declined.please call back at [phone]. Thanks,

## 2017-09-12 RX ORDER — AMLODIPINE BESYLATE 2.5 MG/1
TABLET ORAL
Qty: 30 TABLET | Refills: 0 | Status: SHIPPED | OUTPATIENT
Start: 2017-09-12 | End: 2017-10-11 | Stop reason: SDUPTHER

## 2017-09-21 ENCOUNTER — TELEPHONE (OUTPATIENT)
Dept: HEMATOLOGY/ONCOLOGY | Facility: CLINIC | Age: 82
End: 2017-09-21

## 2017-09-21 ENCOUNTER — OFFICE VISIT (OUTPATIENT)
Dept: PODIATRY | Facility: CLINIC | Age: 82
End: 2017-09-21
Payer: COMMERCIAL

## 2017-09-21 VITALS — BODY MASS INDEX: 24.06 KG/M2 | WEIGHT: 135.81 LBS | HEIGHT: 63 IN

## 2017-09-21 DIAGNOSIS — L60.2 ONYCHAUXIS: Primary | ICD-10-CM

## 2017-09-21 PROCEDURE — 99999 PR PBB SHADOW E&M-EST. PATIENT-LVL III: CPT | Mod: PBBFAC,,,

## 2017-09-21 PROCEDURE — 99499 UNLISTED E&M SERVICE: CPT | Mod: S$GLB,,,

## 2017-09-21 PROCEDURE — 17999 UNLISTD PX SKN MUC MEMB SUBQ: CPT | Mod: CSM,S$GLB,,

## 2017-09-21 NOTE — TELEPHONE ENCOUNTER
Called patient in regards to pain management appointment patient would like to cancel with dr. Muñiz as she is already seeing dr. Freeman

## 2017-09-21 NOTE — TELEPHONE ENCOUNTER
----- Message from Guillermo Norris sent at 9/21/2017  2:23 PM CDT -----  Contact: pt  Pt is requesting a callback, already has a pain management doctor needs to know why Dr Santizo wanted her to see Dr Harrison  Call Back#[phone]  Thanks

## 2017-09-25 DIAGNOSIS — F41.9 ANXIETY: ICD-10-CM

## 2017-09-26 RX ORDER — DIAZEPAM 5 MG/1
TABLET ORAL
Qty: 30 TABLET | Refills: 0 | Status: SHIPPED | OUTPATIENT
Start: 2017-09-26 | End: 2017-10-03 | Stop reason: SDUPTHER

## 2017-09-26 NOTE — TELEPHONE ENCOUNTER
Spoke with pt and scheduled her for 10/03/2017; pt is asking if she can get a few to last her till her appt due to she only has two days left? Pt said that she is taking 1 tablet every day.     Please review and advise.     Thanks.

## 2017-10-03 ENCOUNTER — OFFICE VISIT (OUTPATIENT)
Dept: FAMILY MEDICINE | Facility: CLINIC | Age: 82
End: 2017-10-03
Payer: COMMERCIAL

## 2017-10-03 VITALS
DIASTOLIC BLOOD PRESSURE: 80 MMHG | HEIGHT: 63 IN | TEMPERATURE: 98 F | BODY MASS INDEX: 24.04 KG/M2 | SYSTOLIC BLOOD PRESSURE: 158 MMHG | WEIGHT: 135.69 LBS | HEART RATE: 75 BPM | OXYGEN SATURATION: 95 %

## 2017-10-03 DIAGNOSIS — F41.9 ANXIETY: Primary | ICD-10-CM

## 2017-10-03 DIAGNOSIS — I10 ESSENTIAL HYPERTENSION: ICD-10-CM

## 2017-10-03 PROCEDURE — 99999 PR PBB SHADOW E&M-EST. PATIENT-LVL III: CPT | Mod: PBBFAC,,, | Performed by: NURSE PRACTITIONER

## 2017-10-03 PROCEDURE — 90471 IMMUNIZATION ADMIN: CPT | Mod: S$GLB,,, | Performed by: NURSE PRACTITIONER

## 2017-10-03 PROCEDURE — 99214 OFFICE O/P EST MOD 30 MIN: CPT | Mod: 25,S$GLB,, | Performed by: NURSE PRACTITIONER

## 2017-10-03 PROCEDURE — 90662 IIV NO PRSV INCREASED AG IM: CPT | Mod: S$GLB,,, | Performed by: NURSE PRACTITIONER

## 2017-10-03 RX ORDER — DIAZEPAM 5 MG/1
5 TABLET ORAL 2 TIMES DAILY PRN
Qty: 30 TABLET | Refills: 2 | Status: SHIPPED | OUTPATIENT
Start: 2017-11-01 | End: 2017-10-25 | Stop reason: SDUPTHER

## 2017-10-03 RX ORDER — DICLOFENAC SODIUM 20 MG/G
SOLUTION TOPICAL
Refills: 3 | COMMUNITY
Start: 2017-08-23 | End: 2021-01-01

## 2017-10-03 NOTE — PROGRESS NOTES
Subjective:       Patient ID: Judy Thompson is a 84 y.o. female.    Chief Complaint: Medication Refill    Medication Refill   Pertinent negatives include no chest pain, chills, coughing, fever, headaches or rash.      Patient presents to clinic for f/u of anxiety. She is maintained on Valium 5 mg daily PRN with relief, estimates use once daily.     HTN - she is maintained Lotensin 40 mg daily. Her b/p is elevated in clinic. LOV, she was placed on norvasc 2.5 mg. She has been monitoring her b/p at home, notes it to range 140s/80s.     Review of Systems   Constitutional: Negative for chills and fever.   Respiratory: Negative for cough and shortness of breath.    Cardiovascular: Negative for chest pain and palpitations.   Skin: Negative for rash and wound.   Neurological: Negative for dizziness and headaches.   Psychiatric/Behavioral: Positive for dysphoric mood. Negative for sleep disturbance. The patient is nervous/anxious.        Objective:      Physical Exam   Constitutional: She is oriented to person, place, and time. She appears well-developed and well-nourished.   HENT:   Head: Normocephalic and atraumatic.   Cardiovascular: Normal rate, regular rhythm and normal heart sounds.    No murmur heard.  Pulmonary/Chest: Effort normal and breath sounds normal. No respiratory distress. She has no wheezes. She has no rales.   Musculoskeletal: Normal range of motion. She exhibits no edema, tenderness or deformity.   Neurological: She is alert and oriented to person, place, and time. No cranial nerve deficit.   Skin: Skin is warm and dry.   Psychiatric: She has a normal mood and affect. Her behavior is normal.   Nursing note and vitals reviewed.      Assessment:       1. Anxiety    2. Essential hypertension        Plan:   Judy was seen today for medication refill.    Diagnoses and all orders for this visit:    Anxiety  -     VALIUM 5 mg tablet; Take 1 tablet (5 mg total) by mouth 2 (two) times daily as needed.  Side  effects and precautions of medication use reviewed with patient, expressed understanding. No questions or concerns.  Self-care, sx monitoring, and counseling reviewed. Patient receptive to discussion.     HTN  Stable. Continue current regimen. Routine f/u.

## 2017-10-11 DIAGNOSIS — I10 ESSENTIAL HYPERTENSION: ICD-10-CM

## 2017-10-11 RX ORDER — AMLODIPINE BESYLATE 2.5 MG/1
TABLET ORAL
Qty: 30 TABLET | Refills: 0 | Status: SHIPPED | OUTPATIENT
Start: 2017-10-11 | End: 2017-11-06 | Stop reason: SDUPTHER

## 2017-10-23 DIAGNOSIS — F41.9 ANXIETY: ICD-10-CM

## 2017-10-23 RX ORDER — DIAZEPAM 5 MG/1
TABLET ORAL
Qty: 30 TABLET | Refills: 0 | OUTPATIENT
Start: 2017-10-23

## 2017-10-25 RX ORDER — DIAZEPAM 5 MG/1
5 TABLET ORAL 2 TIMES DAILY PRN
Qty: 30 TABLET | Refills: 2 | Status: SHIPPED | OUTPATIENT
Start: 2017-11-01 | End: 2018-01-29 | Stop reason: SDUPTHER

## 2017-10-25 NOTE — TELEPHONE ENCOUNTER
Spoke w/ Diane at the pharmacy, and asked her if the pt had dropped off anything for refill. She stated that nothing has been dropped in for refill since May but pt did have a refill on 9-29-17 but nothing has been filled since then. Please advise thank you!     placed on desk for pt.

## 2017-10-25 NOTE — TELEPHONE ENCOUNTER
*pt of Brad*    Pt stated that she did not receive it @ [facility]      Checked Faxes and controlled RX .

## 2017-10-25 NOTE — TELEPHONE ENCOUNTER
----- Message from Genevieve Khan sent at 10/25/2017  8:49 AM CDT -----  Contact: self  Patient called regarding refill of diazePAM (VALIUM) 5 MG tablet . Stating last appt a Rx would be sent to pharmacy, no Rx received. Please contact [phone] (home)       [facility] [address] [place] [address] & [address] [address]  Phone: [phone] Fax: [phone]

## 2017-11-06 DIAGNOSIS — I10 ESSENTIAL HYPERTENSION: ICD-10-CM

## 2017-11-06 RX ORDER — AMLODIPINE BESYLATE 2.5 MG/1
TABLET ORAL
Qty: 30 TABLET | Refills: 1 | Status: SHIPPED | OUTPATIENT
Start: 2017-11-06 | End: 2018-01-16 | Stop reason: SDUPTHER

## 2017-11-16 ENCOUNTER — OFFICE VISIT (OUTPATIENT)
Dept: PODIATRY | Facility: CLINIC | Age: 82
End: 2017-11-16

## 2017-11-16 VITALS — WEIGHT: 135.56 LBS | HEIGHT: 63 IN | BODY MASS INDEX: 24.02 KG/M2

## 2017-11-16 DIAGNOSIS — B35.1 ONYCHOMYCOSIS DUE TO DERMATOPHYTE: Primary | ICD-10-CM

## 2017-11-16 PROCEDURE — 99499 UNLISTED E&M SERVICE: CPT | Mod: CSM,,, | Performed by: PODIATRIST

## 2017-11-16 PROCEDURE — 99999 PR PBB SHADOW E&M-EST. PATIENT-LVL III: CPT | Mod: PBBFAC,,, | Performed by: PODIATRIST

## 2017-11-16 PROCEDURE — 17999 UNLISTD PX SKN MUC MEMB SUBQ: CPT | Mod: CSM,,, | Performed by: PODIATRIST

## 2017-11-17 NOTE — PROGRESS NOTES
Patient presents for routine non-covered foot care.  Patient does not have high risk feet. Pedal pulses are palpable bilateral.  Nails are elongated, thickened bilateral.  Diagnosis is onychomycosis.  Nails were reduced bilateral.  Patient tolerated well and related relief.  RTC prn as a Procedure Brief.

## 2018-01-16 DIAGNOSIS — I10 ESSENTIAL HYPERTENSION: ICD-10-CM

## 2018-01-16 RX ORDER — AMLODIPINE BESYLATE 2.5 MG/1
TABLET ORAL
Qty: 90 TABLET | Refills: 1 | Status: SHIPPED | OUTPATIENT
Start: 2018-01-16 | End: 2018-07-16 | Stop reason: SDUPTHER

## 2018-01-23 ENCOUNTER — TELEPHONE (OUTPATIENT)
Dept: HEMATOLOGY/ONCOLOGY | Facility: CLINIC | Age: 83
End: 2018-01-23

## 2018-01-23 NOTE — TELEPHONE ENCOUNTER
Returned call to patient, rescheduled her appointment to Monday afternoon, please adjust her prolia appointment

## 2018-01-23 NOTE — TELEPHONE ENCOUNTER
----- Message from Genevieve Moreno sent at 1/22/2018  2:49 PM CST -----  Please call patient in regards to rescheduling appt [phone] (home)

## 2018-01-24 ENCOUNTER — OFFICE VISIT (OUTPATIENT)
Dept: PODIATRY | Facility: CLINIC | Age: 83
End: 2018-01-24
Payer: COMMERCIAL

## 2018-01-24 VITALS — HEIGHT: 63 IN | WEIGHT: 138 LBS | BODY MASS INDEX: 24.45 KG/M2

## 2018-01-24 DIAGNOSIS — L60.2 ONYCHAUXIS: ICD-10-CM

## 2018-01-24 DIAGNOSIS — B35.1 ONYCHOMYCOSIS DUE TO DERMATOPHYTE: Primary | ICD-10-CM

## 2018-01-24 PROCEDURE — 99499 UNLISTED E&M SERVICE: CPT | Mod: CSM,,, | Performed by: PODIATRIST

## 2018-01-24 PROCEDURE — 99999 PR PBB SHADOW E&M-EST. PATIENT-LVL III: CPT | Mod: PBBFAC,,, | Performed by: PODIATRIST

## 2018-01-24 PROCEDURE — 17999 UNLISTD PX SKN MUC MEMB SUBQ: CPT | Mod: CSM,,, | Performed by: PODIATRIST

## 2018-01-24 NOTE — PROGRESS NOTES
Patient presents for routine non-covered foot care.  Patient does not have high risk feet. Pedal pulses are palpable bilateral.  Nails are elongated, thickened bilateral.  Diagnosis is onychomycosis and onychauxis.  Nails were reduced bilateral.  Patient tolerated well and related relief.  RTC prn as a Procedure Brief.

## 2018-01-26 DIAGNOSIS — M81.0 SENILE OSTEOPOROSIS: Primary | ICD-10-CM

## 2018-01-29 ENCOUNTER — OFFICE VISIT (OUTPATIENT)
Dept: HEMATOLOGY/ONCOLOGY | Facility: CLINIC | Age: 83
End: 2018-01-29
Payer: COMMERCIAL

## 2018-01-29 ENCOUNTER — LAB VISIT (OUTPATIENT)
Dept: LAB | Facility: HOSPITAL | Age: 83
End: 2018-01-29
Attending: NURSE PRACTITIONER
Payer: COMMERCIAL

## 2018-01-29 ENCOUNTER — INFUSION (OUTPATIENT)
Dept: INFUSION THERAPY | Facility: HOSPITAL | Age: 83
End: 2018-01-29
Attending: NURSE PRACTITIONER
Payer: COMMERCIAL

## 2018-01-29 VITALS
RESPIRATION RATE: 19 BRPM | DIASTOLIC BLOOD PRESSURE: 71 MMHG | BODY MASS INDEX: 24.34 KG/M2 | HEART RATE: 63 BPM | WEIGHT: 137.38 LBS | SYSTOLIC BLOOD PRESSURE: 158 MMHG | TEMPERATURE: 98 F | HEIGHT: 63 IN

## 2018-01-29 VITALS
HEIGHT: 63 IN | RESPIRATION RATE: 19 BRPM | TEMPERATURE: 98 F | DIASTOLIC BLOOD PRESSURE: 71 MMHG | OXYGEN SATURATION: 98 % | HEART RATE: 63 BPM | WEIGHT: 137.38 LBS | BODY MASS INDEX: 24.34 KG/M2 | SYSTOLIC BLOOD PRESSURE: 158 MMHG

## 2018-01-29 DIAGNOSIS — M81.0 OSTEOPOROSIS, SENILE: Primary | ICD-10-CM

## 2018-01-29 DIAGNOSIS — S72.001A HIP FRACTURE, RIGHT, CLOSED, INITIAL ENCOUNTER: ICD-10-CM

## 2018-01-29 DIAGNOSIS — F41.9 ANXIETY: ICD-10-CM

## 2018-01-29 DIAGNOSIS — M81.0 AGE-RELATED OSTEOPOROSIS WITHOUT CURRENT PATHOLOGICAL FRACTURE: ICD-10-CM

## 2018-01-29 DIAGNOSIS — M81.0 SENILE OSTEOPOROSIS: ICD-10-CM

## 2018-01-29 LAB
25(OH)D3+25(OH)D2 SERPL-MCNC: 87 NG/ML
ANION GAP SERPL CALC-SCNC: 10 MMOL/L
BUN SERPL-MCNC: 19 MG/DL
CALCIUM SERPL-MCNC: 9.6 MG/DL
CHLORIDE SERPL-SCNC: 101 MMOL/L
CO2 SERPL-SCNC: 29 MMOL/L
CREAT SERPL-MCNC: 0.61 MG/DL
EST. GFR  (AFRICAN AMERICAN): >60 ML/MIN/1.73 M^2
EST. GFR  (NON AFRICAN AMERICAN): >60 ML/MIN/1.73 M^2
GLUCOSE SERPL-MCNC: 133 MG/DL
POTASSIUM SERPL-SCNC: 4 MMOL/L
SODIUM SERPL-SCNC: 140 MMOL/L

## 2018-01-29 PROCEDURE — 63600175 PHARM REV CODE 636 W HCPCS: Mod: TB,PN | Performed by: NURSE PRACTITIONER

## 2018-01-29 PROCEDURE — 99999 PR PBB SHADOW E&M-EST. PATIENT-LVL IV: CPT | Mod: PBBFAC,,, | Performed by: NURSE PRACTITIONER

## 2018-01-29 PROCEDURE — 80048 BASIC METABOLIC PNL TOTAL CA: CPT

## 2018-01-29 PROCEDURE — 36415 COLL VENOUS BLD VENIPUNCTURE: CPT | Mod: PN

## 2018-01-29 PROCEDURE — 82306 VITAMIN D 25 HYDROXY: CPT | Mod: PN

## 2018-01-29 PROCEDURE — 80048 BASIC METABOLIC PNL TOTAL CA: CPT | Mod: PN

## 2018-01-29 PROCEDURE — 82306 VITAMIN D 25 HYDROXY: CPT

## 2018-01-29 PROCEDURE — 99214 OFFICE O/P EST MOD 30 MIN: CPT | Mod: S$GLB,,, | Performed by: NURSE PRACTITIONER

## 2018-01-29 PROCEDURE — 96372 THER/PROPH/DIAG INJ SC/IM: CPT | Mod: PN

## 2018-01-29 RX ADMIN — DENOSUMAB 60 MG: 60 INJECTION SUBCUTANEOUS at 03:01

## 2018-01-29 NOTE — PROGRESS NOTES
HISTORY OF PRESENT ILLNESS:  This is an 85-year-old white female known to Dr. 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  The patient is status post lumpectomy,   post-lumpectomy irradiation and five years of adjuvant Arimidex/Zometa.  The patient has had bilateral breast   augmentation following surgery.  She is followed annually in June/July. The patient also carries a diagnosis for   melanoma which was widely resected in her right lower extremity.  The patient had carried a diagnosis of   osteopenia with high risk for fracture.  She suffered a fall while on a cruise in April 2015 and sustained a right   hip fracture.  Her diagnosis was subsequently advanced to osteoporosis.  The patient presents to the clinic today   for evaluation prior to her next Prolia injection (#6).  She remains compliant in taking calcium and vitamin D   supplementation.  She denies any invasive dental procedures in the last three months nor plans for the future.    She denies any recent fevers, chills, cold, cough, congestion, bleeding, dysuria, breast concerns, chest pain,   abdominal discomfort, nausea, diarrhea,etc. No other new complaints or pertinent findings on a 10-point review of systems.    PHYSICAL EXAMINATION:  GENERAL:  Well-developed, elderly white female in no acute distress.  Alert and oriented x3.  VITAL SIGNS:    Wt Readings from Last 3 Encounters:   01/29/18 62.3 kg (137 lb 5.6 oz)   01/29/18 62.3 kg (137 lb 5.6 oz)   01/24/18 62.6 kg (138 lb 0.1 oz)     Temp Readings from Last 3 Encounters:   01/29/18 97.9 °F (36.6 °C)   01/29/18 97.9 °F (36.6 °C) (Oral)   10/03/17 97.9 °F (36.6 °C) (Oral)     BP Readings from Last 3 Encounters:   01/29/18 (!) 158/71   01/29/18 (!) 158/71   10/03/17 (!) 158/80     Pulse Readings from Last 3 Encounters:   01/29/18 63   01/29/18 63   10/03/17 75     HEENT:  Normocephalic, atraumatic.  Oral mucosa pink and moist.  Lips without   lesions.  Tongue  midline.  Oropharynx clear.  Nonicteric sclerae.  NECK:  Supple.  No adenopathy.  HEART:  Regular rate and rhythm without murmur, gallop or rub.  LUNGS:  Clear to auscultation bilaterally.  ABDOMEN:  Soft, nontender and nondistended with positive normoactive bowel   sounds.  No hepatosplenomegaly.  EXTREMITIES:  No cyanosis, clubbing or edema.  Distal pulses are intact.    LABORATORY:    BMP  Lab Results   Component Value Date     01/29/2018    K 4.0 01/29/2018     01/29/2018    CO2 29 01/29/2018    BUN 19 (H) 01/29/2018    CREATININE 0.61 01/29/2018    CALCIUM 9.6 01/29/2018    ANIONGAP 10 01/29/2018    ESTGFRAFRICA >60 01/29/2018    EGFRNONAA >60 01/29/2018     Vitamin D: 87    IMPRESSION:  1.  Osteoporosis.  2.  Status post right hip fracture in April 2015.  3.  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  4.  Completely resected malignant melanoma involving the right lower extremity.    PLAN:  1.  Proceed with Prolia 60 mg subQ today.  2.  Continue taking calcium and vitamin D supplementation and notify the office for any need of invasive dental work.  3.  Return to clinic in 6 months for annual breast/Prolia with interval CBC, CMP, LDH, chest x-ray, mammogram and   bone mineral density (on or after 06/22/2018).    Assessment/plan reviewed and approved by Dr. Santizo.

## 2018-01-29 NOTE — PLAN OF CARE
Problem: Patient Care Overview  Goal: Plan of Care Review  Pt. Tolerated prolia injection without noted distress.  Pt. Admits to oral calcium w/ vitamin D replacement. Return in 6 months for next scheduled prolia. Ambulated off unit independently .

## 2018-01-29 NOTE — PATIENT INSTRUCTIONS
Denosumab injection  What is this medicine?  DENOSUMAB (den oh hadley mab) slows bone breakdown. Prolia is used to treat osteoporosis in women after menopause and in men. Xgeva is used to prevent bone fractures and other bone problems caused by cancer bone metastases. Xgeva is also used to treat giant cell tumor of the bone.  How should I use this medicine?  This medicine is for injection under the skin. It is given by a health care professional in a hospital or clinic setting.  If you are getting Prolia, a special MedGuide will be given to you by the pharmacist with each prescription and refill. Be sure to read this information carefully each time.  For Prolia, talk to your pediatrician regarding the use of this medicine in children. Special care may be needed. For Xgeva, talk to your pediatrician regarding the use of this medicine in children. While this drug may be prescribed for children as young as 13 years for selected conditions, precautions do apply.  What side effects may I notice from receiving this medicine?  Side effects that you should report to your doctor or health care professional as soon as possible:  · allergic reactions like skin rash, itching or hives, swelling of the face, lips, or tongue  · breathing problems  · chest pain  · fast, irregular heartbeat  · feeling faint or lightheaded, falls  · fever, chills, or any other sign of infection  · muscle spasms, tightening, or twitches  · numbness or tingling  · skin blisters or bumps, or is dry, peels, or red  · slow healing or unexplained pain in the mouth or jaw  · unusual bleeding or bruising  Side effects that usually do not require medical attention (Report these to your doctor or health care professional if they continue or are bothersome.):  · muscle pain  · stomach upset, gas  What may interact with this medicine?  Do not take this medicine with any of the following medications:  · other medicines containing denosumab  This medicine may also  interact with the following medications:  · medicines that suppress the immune system  · medicines that treat cancer  · steroid medicines like prednisone or cortisone  What if I miss a dose?  It is important not to miss your dose. Call your doctor or health care professional if you are unable to keep an appointment.  Where should I keep my medicine?  This medicine is only given in a clinic, doctor's office, or other health care setting and will not be stored at home.  What should I tell my health care provider before I take this medicine?  They need to know if you have any of these conditions:  · dental disease  · eczema  · infection or history of infections  · kidney disease or on dialysis  · low blood calcium or vitamin D  · malabsorption syndrome  · scheduled to have surgery or tooth extraction  · taking medicine that contains denosumab  · thyroid or parathyroid disease  · an unusual reaction to denosumab, other medicines, foods, dyes, or preservatives  · pregnant or trying to get pregnant  · breast-feeding  What should I watch for while using this medicine?  Visit your doctor or health care professional for regular checks on your progress. Your doctor or health care professional may order blood tests and other tests to see how you are doing.  Call your doctor or health care professional if you get a cold or other infection while receiving this medicine. Do not treat yourself. This medicine may decrease your body's ability to fight infection.  You should make sure you get enough calcium and vitamin D while you are taking this medicine, unless your doctor tells you not to. Discuss the foods you eat and the vitamins you take with your health care professional.  See your dentist regularly. Brush and floss your teeth as directed. Before you have any dental work done, tell your dentist you are receiving this medicine.  Do not become pregnant while taking this medicine or for 5 months after stopping it. Women should  inform their doctor if they wish to become pregnant or think they might be pregnant. There is a potential for serious side effects to an unborn child. Talk to your health care professional or pharmacist for more information.  NOTE:This sheet is a summary. It may not cover all possible information. If you have questions about this medicine, talk to your doctor, pharmacist, or health care provider. Copyright© 2017 [employer]        Fall Prevention  Falls often occur due to slipping, tripping or losing your balance. Millions of people fall every year and injure themselves. Here are ways to reduce your risk of falling again.  · Think about your fall, was there anything that caused your fall that can be fixed, removed, or replaced?  · Make your home safe by keeping walkways clear of objects you may trip over.  · Use non-slip pads under rugs. Do not use area rugs or small throw rugs.  · Use non-slip mats in bathtubs and showers.  · Install handrails and lights on staircases.  · Do not walk in poorly lit areas.  · Do not stand on chairs or wobbly ladders.  · Use caution when reaching overhead or looking upward. This position can cause a loss of balance.  · Be sure your shoes fit properly, have non-slip bottoms and are in good condition.   · Wear shoes both inside and out. Avoid going barefoot or wearing slippers.  · Be cautious when going up and down stairs, curbs, and when walking on uneven sidewalks.  · If your balance is poor, consider using a cane or walker.  · If your fall was related to alcohol use, stop or limit alcohol intake.   · If your fall was related to use of sleeping medicines, talk to your doctor about this. You may need to reduce your dosage at bedtime if you awaken during the night to go to the bathroom.    · To reduce the need for nighttime bathroom trips:  ¨ Avoid drinking fluids for several hours before going to bed  ¨ Empty your bladder before going to bed  ¨ Men can keep a urinal at the  bedside  · Stay as active as you can. Balance, flexibility, strength, and endurance all come from exercise. They all play a role in preventing falls. Ask your healthcare provider which types of activity are right for you.  · Get your vision checked on a regular basis.  · If you have pets, know where they are before you stand up or walk so you don't trip over them.  · Use night lights.  Date Last Reviewed: 11/5/2015  © 9783-6493 [employer]. [address] [place] [address], [place] [address]. All rights reserved. This information is not intended as a substitute for professional medical care. Always follow your healthcare professional's instructions.        Fall Prevention  Falls often occur due to slipping, tripping or losing your balance. Millions of people fall every year and injure themselves. Here are ways to reduce your risk of falling again.  · Think about your fall, was there anything that caused your fall that can be fixed, removed, or replaced?  · Make your home safe by keeping walkways clear of objects you may trip over.  · Use non-slip pads under rugs. Do not use area rugs or small throw rugs.  · Use non-slip mats in bathtubs and showers.  · Install handrails and lights on staircases.  · Do not walk in poorly lit areas.  · Do not stand on chairs or wobbly ladders.  · Use caution when reaching overhead or looking upward. This position can cause a loss of balance.  · Be sure your shoes fit properly, have non-slip bottoms and are in good condition.   · Wear shoes both inside and out. Avoid going barefoot or wearing slippers.  · Be cautious when going up and down stairs, curbs, and when walking on uneven sidewalks.  · If your balance is poor, consider using a cane or walker.  · If your fall was related to alcohol use, stop or limit alcohol intake.   · If your fall was related to use of sleeping medicines, talk to your doctor about this. You may need to reduce your dosage at bedtime if you awaken during  the night to go to the bathroom.    · To reduce the need for nighttime bathroom trips:  ¨ Avoid drinking fluids for several hours before going to bed  ¨ Empty your bladder before going to bed  ¨ Men can keep a urinal at the bedside  · Stay as active as you can. Balance, flexibility, strength, and endurance all come from exercise. They all play a role in preventing falls. Ask your healthcare provider which types of activity are right for you.  · Get your vision checked on a regular basis.  · If you have pets, know where they are before you stand up or walk so you don't trip over them.  · Use night lights.  Date Last Reviewed: 11/5/2015  © 3537-4194 [employer]. [address] [place] [address], [place] [address]. All rights reserved. This information is not intended as a substitute for professional medical care. Always follow your healthcare professional's instructions.

## 2018-02-01 ENCOUNTER — OFFICE VISIT (OUTPATIENT)
Dept: FAMILY MEDICINE | Facility: CLINIC | Age: 83
End: 2018-02-01
Payer: COMMERCIAL

## 2018-02-01 VITALS
WEIGHT: 138.25 LBS | BODY MASS INDEX: 24.5 KG/M2 | HEIGHT: 63 IN | DIASTOLIC BLOOD PRESSURE: 64 MMHG | SYSTOLIC BLOOD PRESSURE: 118 MMHG | TEMPERATURE: 98 F | HEART RATE: 60 BPM

## 2018-02-01 DIAGNOSIS — D48.9 NEOPLASM OF UNCERTAIN BEHAVIOR: ICD-10-CM

## 2018-02-01 DIAGNOSIS — I10 ESSENTIAL HYPERTENSION: ICD-10-CM

## 2018-02-01 DIAGNOSIS — Z85.3 HISTORY OF BREAST CANCER IN FEMALE: ICD-10-CM

## 2018-02-01 DIAGNOSIS — M81.0 OSTEOPOROSIS, SENILE: ICD-10-CM

## 2018-02-01 DIAGNOSIS — F41.9 ANXIETY: Primary | ICD-10-CM

## 2018-02-01 PROCEDURE — 1126F AMNT PAIN NOTED NONE PRSNT: CPT | Mod: S$GLB,,, | Performed by: NURSE PRACTITIONER

## 2018-02-01 PROCEDURE — 1159F MED LIST DOCD IN RCRD: CPT | Mod: S$GLB,,, | Performed by: NURSE PRACTITIONER

## 2018-02-01 PROCEDURE — 3008F BODY MASS INDEX DOCD: CPT | Mod: S$GLB,,, | Performed by: NURSE PRACTITIONER

## 2018-02-01 PROCEDURE — 99999 PR PBB SHADOW E&M-EST. PATIENT-LVL IV: CPT | Mod: PBBFAC,,, | Performed by: NURSE PRACTITIONER

## 2018-02-01 PROCEDURE — 99214 OFFICE O/P EST MOD 30 MIN: CPT | Mod: S$GLB,,, | Performed by: NURSE PRACTITIONER

## 2018-02-01 RX ORDER — DIAZEPAM 5 MG/1
TABLET ORAL
Qty: 30 TABLET | Refills: 2 | Status: SHIPPED | OUTPATIENT
Start: 2018-02-01 | End: 2018-04-18 | Stop reason: SDUPTHER

## 2018-02-01 NOTE — PROGRESS NOTES
Subjective:       Patient ID: Judy Thompson is a 85 y.o. female.    Chief Complaint: Medication Refill    HPI     Patient presents to clinic for f/u of anxiety. She is maintained on Valium 5 mg daily PRN with relief, estimates use of 1/2-1 tablet once daily.   She has been maintained on this regimen for several years. She has attempted to discontinue the medication in the past - feels as though sx significantly worsened.     She has hx of stage I invasive right breast carcinoma in association with DCIS s/p lumpectomy, radiation and five years of adjuvant Arimidex. She is established with Dr. Santizo for bi-annual screenings.   Also, has hx of senile osteoporosis and is maintained on Prolia. She is scheduled for repeat mammogram and DXA in the upcoming months.      HTN - her b/p is well controlled in clinic. She does not routinely monitor at home. She is maintained on benazepril 40 mg and Norvasc 2.5 mg daily.     DJD - Right knee, established with Pain Management for management and is maintained on Duexsis with some relief. Has also received Synvisc with Dr. Melendrez.     Lastly, she requests a derm referral for a lesion removal to her left shoulder.    Review of Systems   Constitutional: Negative for chills and fever.   Respiratory: Negative for cough, shortness of breath and wheezing.    Cardiovascular: Negative for chest pain, palpitations and leg swelling.   Gastrointestinal: Negative for blood in stool, diarrhea, nausea and vomiting.   Skin: Negative for rash and wound.   Neurological: Negative for dizziness, light-headedness and headaches.   Psychiatric/Behavioral: Negative for dysphoric mood and sleep disturbance. The patient is nervous/anxious (occasional ).        Objective:      Physical Exam   Constitutional: She is oriented to person, place, and time. She appears well-developed and well-nourished.   HENT:   Head: Normocephalic and atraumatic.   Cardiovascular: Normal rate, regular rhythm and normal heart  sounds.    No murmur heard.  Pulmonary/Chest: Effort normal and breath sounds normal. No respiratory distress. She has no wheezes. She has no rales.   Musculoskeletal: Normal range of motion. She exhibits no edema, tenderness or deformity.   Neurological: She is alert and oriented to person, place, and time. No cranial nerve deficit.   Skin: Skin is warm and dry.        Psychiatric: She has a normal mood and affect. Her behavior is normal.   Nursing note and vitals reviewed.      Assessment:       1. Anxiety    2. Essential hypertension    3. Osteoporosis, senile    4. Neoplasm of uncertain behavior    5. History of breast cancer in female        Plan:   Judy was seen today for medication refill.    Diagnoses and all orders for this visit:    Anxiety  Valium refilled. Side effects and precautions of medication use reviewed with patient, expressed understanding. No questions or concerns.  Self-care, sx monitoring, and counseling reviewed. Patient receptive to discussion.     Essential hypertension  Stable. Continue current regimen. Routine f/u.     Osteoporosis, senile  Stable. Continue current regimen. Routine f/u with hem/onc.     Neoplasm of uncertain behavior  -     Ambulatory referral to Dermatology  Reassured patient that lesion was c/w a seborrheic keratosis and benign.   Patient would like removal; she does not like the appearance.   She has previously seen Dr. Patel and would like a f/u appt.     History of breast cancer in female  Stable. Continue current regimen. Routine f/u with hem/onc.

## 2018-02-27 ENCOUNTER — TELEPHONE (OUTPATIENT)
Dept: DERMATOLOGY | Facility: CLINIC | Age: 83
End: 2018-02-27

## 2018-02-27 NOTE — TELEPHONE ENCOUNTER
----- Message from Essence Head sent at 2/27/2018 12:19 PM CST -----  Contact: self   Patient may have missed a call from your office, if so please call back at [phone] ([email or web address])

## 2018-03-26 RX ORDER — BENAZEPRIL HYDROCHLORIDE 40 MG/1
TABLET ORAL
Qty: 90 TABLET | Refills: 0 | Status: SHIPPED | OUTPATIENT
Start: 2018-03-26 | End: 2018-06-26 | Stop reason: SDUPTHER

## 2018-03-28 ENCOUNTER — OFFICE VISIT (OUTPATIENT)
Dept: DERMATOLOGY | Facility: CLINIC | Age: 83
End: 2018-03-28
Payer: COMMERCIAL

## 2018-03-28 VITALS — HEIGHT: 63 IN | WEIGHT: 137.38 LBS | BODY MASS INDEX: 24.34 KG/M2

## 2018-03-28 DIAGNOSIS — Z85.820 HISTORY OF MELANOMA: ICD-10-CM

## 2018-03-28 DIAGNOSIS — L82.1 SEBORRHEIC KERATOSES: ICD-10-CM

## 2018-03-28 DIAGNOSIS — L30.8 OTHER ECZEMA: ICD-10-CM

## 2018-03-28 DIAGNOSIS — L82.0 SEBORRHEIC KERATOSES, INFLAMED: Primary | ICD-10-CM

## 2018-03-28 PROCEDURE — 99999 PR PBB SHADOW E&M-EST. PATIENT-LVL II: CPT | Mod: PBBFAC,,, | Performed by: DERMATOLOGY

## 2018-03-28 PROCEDURE — 17110 DESTRUCTION B9 LES UP TO 14: CPT | Mod: S$GLB,,, | Performed by: DERMATOLOGY

## 2018-03-28 PROCEDURE — 99214 OFFICE O/P EST MOD 30 MIN: CPT | Mod: 25,S$GLB,, | Performed by: DERMATOLOGY

## 2018-03-28 RX ORDER — TRIAMCINOLONE ACETONIDE 1 MG/G
CREAM TOPICAL
Qty: 454 G | Refills: 3 | Status: SHIPPED | OUTPATIENT
Start: 2018-03-28

## 2018-03-28 NOTE — PROGRESS NOTES
Subjective:       Patient ID:  Judy Thompson is a 85 y.o. female who presents for No chief complaint on file.    HPI  High risk patient here for Follow up - last seen 2016  Declines complete TBSE or UBSE  Only wants area of concern addressed:    Lesion left temple, 15 years, recently got dryer, picked it off one night in sleep. Desires removal.    c/o spots to right breast, left shoulder and left flank- desires removal. Areas present x years. Irritating  Itching left upper back, months, no treatment.     History SCCIS left flank/back resolved s/p efudex therapy for 1 month. No longer scaly.   Cyst s/p biopsy on left lower leg at last visit- near site of melanoma excision (2013). No post procedure complaints.        Review of Systems   Skin: Negative for itching and rash.   Hematologic/Lymphatic: Does not bruise/bleed easily.        Objective:    Physical Exam   Constitutional: She appears well-developed and well-nourished. No distress.   Eyes: Lids are normal.  No conjunctival no injection.   Neurological: She is alert and oriented to person, place, and time. She is not disoriented.   Psychiatric: She has a normal mood and affect.   Skin:   Areas Examined (abnormalities noted in diagram):   Head / Face Inspection Performed  Neck Inspection Performed  Chest / Axilla Inspection Performed  Abdomen Inspection Performed  Back Inspection Performed  RUE Inspected  LUE Inspection Performed  LLE Inspection Performed                   Diagram Legend     Erythematous scaling macule/papule c/w actinic keratosis       Vascular papule c/w angioma      Pigmented verrucoid papule/plaque c/w seborrheic keratosis      Yellow umbilicated papule c/w sebaceous hyperplasia      Irregularly shaped tan macule c/w lentigo     1-2 mm smooth white papules consistent with Milia      Movable subcutaneous cyst with punctum c/w epidermal inclusion cyst      Subcutaneous movable cyst c/w pilar cyst      Firm pink to brown papule c/w  dermatofibroma      Pedunculated fleshy papule(s) c/w skin tag(s)      Evenly pigmented macule c/w junctional nevus     Mildly variegated pigmented, slightly irregular-bordered macule c/w mildly atypical nevus      Flesh colored to evenly pigmented papule c/w intradermal nevus       Pink pearly papule/plaque c/w basal cell carcinoma      Erythematous hyperkeratotic cursted plaque c/w SCC      Surgical scar with no sign of skin cancer recurrence      Open and closed comedones      Inflammatory papules and pustules      Verrucoid papule consistent consistent with wart     Erythematous eczematous patches and plaques     Dystrophic onycholytic nail with subungual debris c/w onychomycosis     Umbilicated papule    Erythematous-base heme-crusted tan verrucoid plaque consistent with inflamed seborrheic keratosis     Erythematous Silvery Scaling Plaque c/w Psoriasis     See annotation      Assessment / Plan:        Seborrheic keratoses, inflamed  Cryosurgery procedure note:    Verbal consent from the patient is obtained. Liquid nitrogen cryosurgery is applied to 4 lesions to produce a freeze injury. The patient is aware that blisters may form and is instructed on wound care with gentle cleansing and use of vaseline ointment to keep moist until healed. The patient is supplied a handout on cryosurgery and is instructed to call if lesions do not completely resolve. Risk of dyspigmentation discussed.       Other eczema  Back  Minimal findings today, primarily excoriations  TAC cream bid x 1-2 weeks then prn itching.     History of melanoma  Declines TBSE  Site of previous melanoma evaluated , NER    Seborrheic keratoses, trunk and extremities  These are benign inherited growths without a malignant potential. Reassurance given to patient. No treatment is necessary.                Follow-up in about 6 months (around 9/28/2018).

## 2018-03-28 NOTE — LETTER
March 28, 2018      Elle Hinojosa, NP  [address]  [place] [address]           [facility]  [address] [facility] [address] [place] [address]  Phone: [phone]  Fax: [phone]          Patient: Judy Thompson   MR Number: [id number]   YOB: 1933   Date of Visit: 3/28/2018       Dear Elle Hinojosa:    Thank you for referring Judy Thompson to me for evaluation. Attached you will find relevant portions of my assessment and plan of care.    If you have questions, please do not hesitate to call me. I look forward to following Judy Thompson along with you.    Sincerely,    Sara Patel MD    Enclosure  CC:  No Recipients    If you would like to receive this communication electronically, please contact [email or web address] or [phone] to request more information on [employer] access.    For providers and/or their staff who would like to refer a patient to [facility], please contact us through our one-stop-shop provider referral line, [facility], at [phone].    If you feel you have received this communication in error or would no longer like to receive these types of communications, please e-mail [email or web address]

## 2018-04-18 ENCOUNTER — OFFICE VISIT (OUTPATIENT)
Dept: FAMILY MEDICINE | Facility: CLINIC | Age: 83
End: 2018-04-18
Payer: COMMERCIAL

## 2018-04-18 VITALS
HEART RATE: 68 BPM | WEIGHT: 133.06 LBS | SYSTOLIC BLOOD PRESSURE: 142 MMHG | TEMPERATURE: 98 F | HEIGHT: 63 IN | DIASTOLIC BLOOD PRESSURE: 66 MMHG | BODY MASS INDEX: 23.57 KG/M2

## 2018-04-18 DIAGNOSIS — I10 ESSENTIAL HYPERTENSION: Primary | ICD-10-CM

## 2018-04-18 DIAGNOSIS — F41.9 ANXIETY: ICD-10-CM

## 2018-04-18 PROCEDURE — 99214 OFFICE O/P EST MOD 30 MIN: CPT | Mod: S$GLB,,, | Performed by: FAMILY MEDICINE

## 2018-04-18 PROCEDURE — 3078F DIAST BP <80 MM HG: CPT | Mod: CPTII,S$GLB,, | Performed by: FAMILY MEDICINE

## 2018-04-18 PROCEDURE — 3077F SYST BP >= 140 MM HG: CPT | Mod: CPTII,S$GLB,, | Performed by: FAMILY MEDICINE

## 2018-04-18 PROCEDURE — 99999 PR PBB SHADOW E&M-EST. PATIENT-LVL III: CPT | Mod: PBBFAC,,, | Performed by: FAMILY MEDICINE

## 2018-04-18 RX ORDER — DIAZEPAM 5 MG/1
5 TABLET ORAL DAILY
Qty: 90 TABLET | Refills: 0 | Status: SHIPPED | OUTPATIENT
Start: 2018-04-18 | End: 2018-07-02 | Stop reason: SDUPTHER

## 2018-04-18 NOTE — PROGRESS NOTES
"Subjective:       Patient ID: Judy Thompson is a 85 y.o. female.    Chief Complaint: Medication Refill    She is here for follow-up of Valium.  She takes a half of a 5 mg twice a day.  It helps her deal with some of the stresses in her life.  She has hypertension that has not been well controlled.  Amlodipine was added in the fall.  She does not remember checking her systolic pressures but she seems to remember her diastolic around 70.  No chest pain.  I reviewed previous lab work.      Medication Refill   Pertinent negatives include no abdominal pain, chest pain or fever.     Review of Systems   Constitutional: Negative for fever and unexpected weight change.   Respiratory: Negative for shortness of breath.    Cardiovascular: Negative for chest pain, palpitations and leg swelling.   Gastrointestinal: Negative for abdominal distention and abdominal pain.   Genitourinary: Negative for dysuria and frequency.       Objective:     Blood pressure (!) 142/66, pulse 68, temperature 97.8 °F (36.6 °C), height 5' 3" (1.6 m), weight 60.3 kg (133 lb 0.8 oz).      Physical Exam   Constitutional: She appears well-developed and well-nourished. No distress.   Cardiovascular: Normal rate, regular rhythm, normal heart sounds and intact distal pulses.    No murmur heard.  No carotid bruits   Pulmonary/Chest: Effort normal and breath sounds normal. No respiratory distress.   Abdominal: Soft. She exhibits no distension. There is no tenderness.   Musculoskeletal: She exhibits no edema.   Neurological: She is alert.   Psychiatric: She has a normal mood and affect.       Assessment:       1. Essential hypertension    2. Anxiety        Plan:       Monitor blood pressure.  Return to clinic in July with lab work.  I refilled 3 months of Valium.     "

## 2018-05-01 ENCOUNTER — OFFICE VISIT (OUTPATIENT)
Dept: PODIATRY | Facility: CLINIC | Age: 83
End: 2018-05-01

## 2018-05-01 VITALS — BODY MASS INDEX: 23.55 KG/M2 | WEIGHT: 132.94 LBS | HEIGHT: 63 IN

## 2018-05-01 DIAGNOSIS — L60.2 ONYCHAUXIS: ICD-10-CM

## 2018-05-01 DIAGNOSIS — B35.1 ONYCHOMYCOSIS DUE TO DERMATOPHYTE: Primary | ICD-10-CM

## 2018-05-01 PROCEDURE — 99999 PR PBB SHADOW E&M-EST. PATIENT-LVL III: CPT | Mod: PBBFAC,,, | Performed by: PODIATRIST

## 2018-05-01 PROCEDURE — 17999 UNLISTD PX SKN MUC MEMB SUBQ: CPT | Mod: CSM,S$GLB,, | Performed by: PODIATRIST

## 2018-05-01 PROCEDURE — 99499 UNLISTED E&M SERVICE: CPT | Mod: DBM,S$GLB,, | Performed by: PODIATRIST

## 2018-05-02 DIAGNOSIS — F41.9 ANXIETY: ICD-10-CM

## 2018-05-02 RX ORDER — DIAZEPAM 5 MG/1
TABLET ORAL
Qty: 30 TABLET | Refills: 0 | OUTPATIENT
Start: 2018-05-02

## 2018-06-25 ENCOUNTER — LAB VISIT (OUTPATIENT)
Dept: LAB | Facility: HOSPITAL | Age: 83
End: 2018-06-25
Attending: FAMILY MEDICINE
Payer: COMMERCIAL

## 2018-06-25 DIAGNOSIS — I10 ESSENTIAL HYPERTENSION: ICD-10-CM

## 2018-06-25 LAB
ALBUMIN SERPL BCP-MCNC: 4 G/DL
ALP SERPL-CCNC: 36 U/L
ALT SERPL W/O P-5'-P-CCNC: 14 U/L
ANION GAP SERPL CALC-SCNC: 6 MMOL/L
AST SERPL-CCNC: 19 U/L
BILIRUB SERPL-MCNC: 0.6 MG/DL
BUN SERPL-MCNC: 15 MG/DL
CALCIUM SERPL-MCNC: 9.3 MG/DL
CHLORIDE SERPL-SCNC: 103 MMOL/L
CHOLEST SERPL-MCNC: 167 MG/DL
CHOLEST/HDLC SERPL: 2.7 {RATIO}
CO2 SERPL-SCNC: 28 MMOL/L
CREAT SERPL-MCNC: 0.7 MG/DL
EST. GFR  (AFRICAN AMERICAN): >60 ML/MIN/1.73 M^2
EST. GFR  (NON AFRICAN AMERICAN): >60 ML/MIN/1.73 M^2
GLUCOSE SERPL-MCNC: 100 MG/DL
HDLC SERPL-MCNC: 62 MG/DL
HDLC SERPL: 37.1 %
LDLC SERPL CALC-MCNC: 90.2 MG/DL
NONHDLC SERPL-MCNC: 105 MG/DL
POTASSIUM SERPL-SCNC: 4.2 MMOL/L
PROT SERPL-MCNC: 6.8 G/DL
SODIUM SERPL-SCNC: 137 MMOL/L
TRIGL SERPL-MCNC: 74 MG/DL

## 2018-06-25 PROCEDURE — 36415 COLL VENOUS BLD VENIPUNCTURE: CPT | Mod: PN

## 2018-06-25 PROCEDURE — 80053 COMPREHEN METABOLIC PANEL: CPT

## 2018-06-25 PROCEDURE — 80061 LIPID PANEL: CPT

## 2018-06-26 RX ORDER — BENAZEPRIL HYDROCHLORIDE 40 MG/1
TABLET ORAL
Qty: 90 TABLET | Refills: 3 | Status: SHIPPED | OUTPATIENT
Start: 2018-06-26 | End: 2019-06-19 | Stop reason: SDUPTHER

## 2018-07-02 ENCOUNTER — OFFICE VISIT (OUTPATIENT)
Dept: FAMILY MEDICINE | Facility: CLINIC | Age: 83
End: 2018-07-02
Payer: COMMERCIAL

## 2018-07-02 VITALS
HEART RATE: 70 BPM | BODY MASS INDEX: 23.99 KG/M2 | TEMPERATURE: 98 F | DIASTOLIC BLOOD PRESSURE: 70 MMHG | HEIGHT: 63 IN | SYSTOLIC BLOOD PRESSURE: 158 MMHG | WEIGHT: 135.38 LBS

## 2018-07-02 DIAGNOSIS — I10 ESSENTIAL HYPERTENSION: Primary | ICD-10-CM

## 2018-07-02 DIAGNOSIS — Z91.81 RISK FOR FALLS: ICD-10-CM

## 2018-07-02 DIAGNOSIS — F41.9 ANXIETY: ICD-10-CM

## 2018-07-02 DIAGNOSIS — M17.11 ARTHRITIS OF KNEE, RIGHT: ICD-10-CM

## 2018-07-02 PROCEDURE — 99999 PR PBB SHADOW E&M-EST. PATIENT-LVL III: CPT | Mod: PBBFAC,,, | Performed by: FAMILY MEDICINE

## 2018-07-02 PROCEDURE — 3077F SYST BP >= 140 MM HG: CPT | Mod: CPTII,S$GLB,, | Performed by: FAMILY MEDICINE

## 2018-07-02 PROCEDURE — 99214 OFFICE O/P EST MOD 30 MIN: CPT | Mod: S$GLB,,, | Performed by: FAMILY MEDICINE

## 2018-07-02 PROCEDURE — 3078F DIAST BP <80 MM HG: CPT | Mod: CPTII,S$GLB,, | Performed by: FAMILY MEDICINE

## 2018-07-02 RX ORDER — DIAZEPAM 5 MG/1
5 TABLET ORAL DAILY
Qty: 90 TABLET | Refills: 1 | Status: SHIPPED | OUTPATIENT
Start: 2018-07-02 | End: 2018-10-11 | Stop reason: SDUPTHER

## 2018-07-02 NOTE — PROGRESS NOTES
"Subjective:       Patient ID: Judy Thompson is a 85 y.o. female.    Chief Complaint: Follow-up (lab done )    Follow-up hypertension.  She says that her blood pressure at home has been well controlled.  She does not remember the numbers.  She has been under a lot of stress recently with caring for her 3 grandchildren.  I reviewed her recent lab work.  She does not complain of any urinary tract infection symptoms so we will just follow the 9 white cells in her urine.  Her other blood work was in a good range.  Her current blood pressure medication is lisinopril 40 mg and amlodipine 2.5 mg.  She has some right knee arthritis and uses a cane.  She would like to do physical therapy again.  She has had some injections from Dr. Melendrez      Review of Systems   Constitutional: Negative for fever and unexpected weight change.   Respiratory: Negative for cough and wheezing.    Cardiovascular: Negative for chest pain and palpitations.   Musculoskeletal: Positive for arthralgias and joint swelling.       Objective:     Blood pressure (!) 158/70, pulse 70, temperature 98 °F (36.7 °C), temperature source Oral, height 5' 3" (1.6 m), weight 61.4 kg (135 lb 5.8 oz).      Physical Exam   Constitutional:   She is a thin lady.  Mild distress.   Cardiovascular: Normal rate and regular rhythm.    Pulmonary/Chest: Effort normal and breath sounds normal.   Musculoskeletal: She exhibits no edema.   Neurological: She is alert.       Assessment:       1. Essential hypertension    2. Anxiety    3. Risk for falls    4. Arthritis of knee, right        Plan:       She is due for refill of Valium.  Monitor blood pressure and return to clinic in October.  Physical therapy consult for knee arthritis and fall prevention.  She continues on Prolia for osteoporosis.      "

## 2018-07-16 DIAGNOSIS — I10 ESSENTIAL HYPERTENSION: ICD-10-CM

## 2018-07-16 RX ORDER — AMLODIPINE BESYLATE 2.5 MG/1
TABLET ORAL
Qty: 90 TABLET | Refills: 0 | Status: SHIPPED | OUTPATIENT
Start: 2018-07-16 | End: 2018-10-09 | Stop reason: SDUPTHER

## 2018-07-30 ENCOUNTER — OFFICE VISIT (OUTPATIENT)
Dept: HEMATOLOGY/ONCOLOGY | Facility: CLINIC | Age: 83
End: 2018-07-30
Payer: COMMERCIAL

## 2018-07-30 ENCOUNTER — INFUSION (OUTPATIENT)
Dept: INFUSION THERAPY | Facility: HOSPITAL | Age: 83
End: 2018-07-30
Attending: NURSE PRACTITIONER
Payer: COMMERCIAL

## 2018-07-30 VITALS
RESPIRATION RATE: 20 BRPM | HEART RATE: 66 BPM | HEIGHT: 63 IN | SYSTOLIC BLOOD PRESSURE: 148 MMHG | DIASTOLIC BLOOD PRESSURE: 65 MMHG | TEMPERATURE: 98 F | WEIGHT: 135.13 LBS | BODY MASS INDEX: 23.94 KG/M2

## 2018-07-30 VITALS
WEIGHT: 135.13 LBS | DIASTOLIC BLOOD PRESSURE: 65 MMHG | TEMPERATURE: 98 F | RESPIRATION RATE: 20 BRPM | HEART RATE: 66 BPM | HEIGHT: 63 IN | SYSTOLIC BLOOD PRESSURE: 148 MMHG | BODY MASS INDEX: 23.94 KG/M2

## 2018-07-30 DIAGNOSIS — S72.001A HIP FRACTURE, RIGHT, CLOSED, INITIAL ENCOUNTER: ICD-10-CM

## 2018-07-30 DIAGNOSIS — M81.0 OSTEOPOROSIS, SENILE: Primary | ICD-10-CM

## 2018-07-30 DIAGNOSIS — C50.911 MALIGNANT NEOPLASM OF RIGHT BREAST IN FEMALE, ESTROGEN RECEPTOR POSITIVE, UNSPECIFIED SITE OF BREAST: ICD-10-CM

## 2018-07-30 DIAGNOSIS — Z17.0 MALIGNANT NEOPLASM OF RIGHT BREAST IN FEMALE, ESTROGEN RECEPTOR POSITIVE, UNSPECIFIED SITE OF BREAST: ICD-10-CM

## 2018-07-30 PROCEDURE — 99999 PR PBB SHADOW E&M-EST. PATIENT-LVL IV: CPT | Mod: PBBFAC,,, | Performed by: NURSE PRACTITIONER

## 2018-07-30 PROCEDURE — 96372 THER/PROPH/DIAG INJ SC/IM: CPT | Mod: PN

## 2018-07-30 PROCEDURE — 99214 OFFICE O/P EST MOD 30 MIN: CPT | Mod: S$GLB,,, | Performed by: NURSE PRACTITIONER

## 2018-07-30 PROCEDURE — 3078F DIAST BP <80 MM HG: CPT | Mod: CPTII,S$GLB,, | Performed by: NURSE PRACTITIONER

## 2018-07-30 PROCEDURE — 63600175 PHARM REV CODE 636 W HCPCS: Mod: TB,PN | Performed by: NURSE PRACTITIONER

## 2018-07-30 PROCEDURE — 3077F SYST BP >= 140 MM HG: CPT | Mod: CPTII,S$GLB,, | Performed by: NURSE PRACTITIONER

## 2018-07-30 RX ADMIN — DENOSUMAB 60 MG: 60 INJECTION SUBCUTANEOUS at 02:07

## 2018-07-30 NOTE — PROGRESS NOTES
HISTORY OF PRESENT ILLNESS:  This is an 85-year-old white female known   to Dr. 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    The patient is status post lumpectomy, post-lumpectomy irradiation and five years   of adjuvant Arimidex/Zometa.  The patient has had bilateral breast augmentation   following surgery.  She is followed annually in June/July. The patient also carries a   diagnosis for melanoma which was widely resected in her right lower extremity.    The patient had carried a diagnosis of osteopenia with high risk for fracture.  She   suffered a fall while on a cruise in April 2015 and sustained a right hip fracture.    Her diagnosis was subsequently advanced to osteoporosis.  The patient presents   to the clinic today for her annual breast evaluation and her next Prolia injection.    She remains compliant in taking calcium and vitamin D supplementation.  She   denies any invasive dental procedures in the last three months nor plans for the future.    She denies any recent fevers, chills, cold, cough, congestion, bleeding, dysuria,   breast concerns, chest pain, abdominal discomfort, nausea, diarrhea,etc. No other   new complaints or pertinent findings on a 14-point review of systems.    PHYSICAL EXAMINATION:  GENERAL:  Well-developed, elderly white female in no acute distress.    Alert and oriented x3.  VITAL SIGNS:  Weight:  Loss of 2 pounds in 6 months  Wt Readings from Last 3 Encounters:   07/30/18 61.3 kg (135 lb 2.3 oz)   07/25/18 61.2 kg (135 lb)   07/02/18 61.4 kg (135 lb 5.8 oz)     Temp Readings from Last 3 Encounters:   07/30/18 97.8 °F (36.6 °C) (Oral)   07/02/18 98 °F (36.7 °C) (Oral)   04/18/18 97.8 °F (36.6 °C)     BP Readings from Last 3 Encounters:   07/30/18 (!) 148/65   07/25/18 127/68   07/02/18 (!) 158/70     Pulse Readings from Last 3 Encounters:   07/30/18 66   07/25/18 67   07/02/18 70     HEENT:  Normocephalic, atraumatic.  Oral mucosa pink and  moist.  Lips without   lesions.  Tongue midline.  Oropharynx clear.  Nonicteric sclerae.  NECK:  Supple.  No adenopathy.  HEART:  Regular rate and rhythm without murmur, gallop or rub.  LUNGS:  Clear to auscultation bilaterally.  ABDOMEN:  Soft, nontender and nondistended with positive normoactive bowel   sounds.  No hepatosplenomegaly.  EXTREMITIES:  No cyanosis, clubbing or edema.  Distal pulses are intact.  AXILLAE AND GROIN:  No palpable pathologic lymphadenopathy is appreciated.        SKIN:  Intact/turgor normal                                                       NEUROLOGIC:  Cranial nerves II-XII grossly intact.  Motor:  Good muscle bulk and   tone.  Strength/sensory 5/5 throughout.  Gait stable.   BREASTS: Bilateral reconstruction with no signs of reoccurrence.    LABORATORY:    BMP  Lab Results   Component Value Date     07/30/2018    K 4.0 07/30/2018     07/30/2018    CO2 24 07/30/2018    BUN 16 07/30/2018    CREATININE 0.55 07/30/2018    CALCIUM 9.7 07/30/2018    ANIONGAP 12 07/30/2018    ESTGFRAFRICA >60 07/30/2018    EGFRNONAA >60 07/30/2018     Vitamin D: 85    IMPRESSION:  1.  Osteoporosis.  2.  Status post right hip fracture in April 2015.  3.  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  4.  Completely resected malignant melanoma involving the right lower extremity.    PLAN:  1.  Proceed with Prolia 60 mg subQ today.  2.  Continue taking calcium and vitamin D supplementation and notify the office for any need of invasive dental work.  3.  Return to clinic in 6 months for evaluation prior to next Prolia with interval BMP.  4.  BMD, CXR & Mammogram with labs scheduled 08/22/18 - phone review    Assessment/plan reviewed and approved by Dr. Santizo.

## 2018-08-01 ENCOUNTER — OFFICE VISIT (OUTPATIENT)
Dept: PODIATRY | Facility: CLINIC | Age: 83
End: 2018-08-01

## 2018-08-01 VITALS — BODY MASS INDEX: 23.92 KG/M2 | WEIGHT: 135 LBS | RESPIRATION RATE: 20 BRPM | HEIGHT: 63 IN

## 2018-08-01 DIAGNOSIS — B35.1 ONYCHOMYCOSIS DUE TO DERMATOPHYTE: Primary | ICD-10-CM

## 2018-08-01 DIAGNOSIS — L60.2 ONYCHAUXIS: ICD-10-CM

## 2018-08-01 PROCEDURE — 17999 UNLISTD PX SKN MUC MEMB SUBQ: CPT | Mod: CSM,,, | Performed by: PODIATRIST

## 2018-08-01 PROCEDURE — 99999 PR PBB SHADOW E&M-EST. PATIENT-LVL IV: CPT | Mod: PBBFAC,,, | Performed by: PODIATRIST

## 2018-08-01 PROCEDURE — 99499 UNLISTED E&M SERVICE: CPT | Mod: CSM,,, | Performed by: PODIATRIST

## 2018-08-06 ENCOUNTER — CLINICAL SUPPORT (OUTPATIENT)
Dept: REHABILITATION | Facility: HOSPITAL | Age: 83
End: 2018-08-06
Payer: COMMERCIAL

## 2018-08-06 DIAGNOSIS — G89.29 CHRONIC PAIN OF RIGHT KNEE: ICD-10-CM

## 2018-08-06 DIAGNOSIS — M25.669 DECREASED RANGE OF MOTION (ROM) OF KNEE: ICD-10-CM

## 2018-08-06 DIAGNOSIS — M25.561 CHRONIC PAIN OF RIGHT KNEE: ICD-10-CM

## 2018-08-06 DIAGNOSIS — R29.898 DECREASED STRENGTH OF LOWER EXTREMITY: ICD-10-CM

## 2018-08-06 PROCEDURE — 97161 PT EVAL LOW COMPLEX 20 MIN: CPT | Mod: PN

## 2018-08-06 PROCEDURE — G8978 MOBILITY CURRENT STATUS: HCPCS | Mod: CK,PN

## 2018-08-06 PROCEDURE — 97110 THERAPEUTIC EXERCISES: CPT | Mod: PN

## 2018-08-06 PROCEDURE — G8979 MOBILITY GOAL STATUS: HCPCS | Mod: CJ,PN

## 2018-08-06 NOTE — PROGRESS NOTES
[facility] Physical Therapy Initial Evaluation    Name: Judy Thompson  Clinic Number: [id number]    Judy is a 85 y.o. female evaluated on 8/6/2018.     Diagnosis:   Encounter Diagnoses   Name Primary?    Chronic pain of right knee     Decreased strength of lower extremity     Decreased range of motion (ROM) of knee      Physician: Eladio Melendrez MD  Treatment Orders: PT Eval and Treat    Past Medical History:   Diagnosis Date    Anxiety     Breast cancer 2005    right breast    Cancer 2006    Hypertension     Osteopenia 11/5/2014    Squamous cell carcinoma     L flank  treated w/ efudex 8-2015      Review of patient's allergies indicates:   Allergen Reactions    Codeine Nausea And Vomiting     Precautions: Osteoporosis  Occupation: Business owner - desk work    Envoirnmental concerns: stairs to get into home - reports no issue with this but prolonged standing is most irritating.  Cultural, Spiritual, Developmental and Educational concerns:none  Abuse/Neglect, Nutritional concerns: none    Subjective  Pt reports to clinic with R knee pain which has been bothering her for a while. She reports her R hip was replaced in [place] approx 3-4 years ago. She takes Duexis for pain with some relief 1-2 times per day. She reports her issues are with pain to R knee and with standing longer periods of time. She reports pain is to distal aspect of knee. Walking longer distances she uses a cane, not around home.     Increases symptoms: standing  Decreases Symptoms: sitting    Diagnostic Tests: Radiographs    Pain Scale: Judy rates pain to be 4-5/10 currently with sitting and increases to 8-9-10/10. Pain measured on a scale of 1-10.    Patient Goals: decrease pain and avoid surgery if possible    Objective  Observation: Pt ambulates without cane into clinic. She is 85 year old WD, WN female who is alert and oriented x 3.     Posture: FHRS    Knee Left Right   Flexion 143 130   Extention +2 0   *Tested  with patient supine    Hip MMT  Flexion: L 4/5, R 3+/5  Abd: 3/5 B  Ext: 4/5 B    Knee Strength Left Right   Flexion 5/5 4+/5   Extension 5/5 4+/5     Knee Special Test +/-   Anterior Drawer -   Post Drawer -   Radha -   Apley Compression/Distraction -   Patellar Grind -     HS length   R 60  L 68     Joint Mobility: hypomobile patellar mobility into sup/inf glides grade III-IV  Palpation: TTP along medial patella and along patellar tendon  Sensation: intact to light touch    Edema:  Left: absent  Right: moderate    Gait: Thompson ambulated 60 feet with no AD.  Level of Assistance: independent  Patient displays decreased weight shift.     Treatment:  Time In: 3:40pm  Time Out: 3:45pm    PT Evaluation Completed? Yes  Discussed Plan of Care with patient: Yes    Judy received instruction in and demonstrated therapeutic exercises for 15 minutes of therapeutic exercises.  Quad set over towel roll x 20  Clamshells x 20 B    Written Home Exercises Provided: see above  Judy demo good understanding of the education provided. Patient demo good return demo of skill of exercises.    History  Co-morbidities and personal factors that may impact the plan of care Examination  Body Structures and Functions, activity limitations and participation restrictions that may impact the plan of care    Clinical Presentation   Co-morbidities:   advanced age        Personal Factors:   no deficits Body Regions:   lower extremities    Body Systems:    ROM  strength  gait            Participation Restrictions:   Work limitations     Activity limitations:   Learning and applying knowledge  no deficits    General Tasks and Commands  no deficits    Communication  no deficits    Mobility  walking  standing    Self care  no deficits    Domestic Life  doing house work (cleaning house, washing dishes, laundry)    Interactions/Relationships  no deficits    Life Areas  no deficits    Community and Social Life  community life         stable and  uncomplicated                      low   low  moderate Decision Making/ Complexity Score:  low     CMS Impairment/Limitation/Restriction for FOTO Knee Survey  Status Limitation G-Code CMS Severity Modifier  Intake 49% 51% Current Status CK - At least 40 percent but less than 60 percent  Predicted 53% 47% Goal Status+ CK - At least 40 percent but less than 60 percent      Assessment  This is a 85 y.o. female referred to outpatient physical therapy and presents with a medical diagnosis of   Encounter Diagnoses   Name Primary?    Chronic pain of right knee     Decreased strength of lower extremity     Decreased range of motion (ROM) of knee     and demonstrates limitations as described in the problem list. Pt will benefit from physical therapy services in order to maximize pain free and/or functional use of right knee. The following goals were discussed with the patient and patient is in agreement with them as to be addressed in the treatment plan.     Problem List: pain, decreased ROM, decreased flexibility, decreased strength, decreased balance and stability and antalgic gait.    Short Term Goals:  4 weeks  1. Pt will present with increased R knee flexion by 5 degrees.    2. Pt will present with decreased inflammation to R patellar tendon to allow for increased patellar mobility and decreased pain with standing for pain level of 6/10.  3. Pt will present with increased R knee ext to equal L for increased ease with heel strike during ambulation.  4. Pt will present with increased hip abd MMT by one half grade for increased stability during ambulation and decreased stress to R knee.   5. Pt will present with increased R knee MMT into flex and ext to 5/5 for increased stability with ambulation.   6. Pt will present with increased HS length on R by 5 degrees for decreased stress to R knee.     Long Term Goals: 6-8 weeks  1. Pt will present with increased R knee flexion by 5 degrees.    2. Pt will present with  decreased inflammation to R patellar tendon to allow for increased patellar mobility and decreased pain with standing for pain level of 6/10.  3. Pt will present with increased R knee ext to equal L for increased ease with heel strike during ambulation.  4. Pt will present with increased hip abd MMT by one half grade for increased stability during ambulation and decreased stress to R knee.   5. Pt will present with increased R knee MMT into flex and ext to 5/5 for increased stability with ambulation.   5. Pt will be independent with HEP and self management of symptoms.     Plan  Pt will be treated by physical therapy 2 times a week for 8 weeks for therapeutic exercises, stretching, strengthening, balance, manual therapy, modalities PRN, and any other skilled physical therapy technique deemed necessary to achieve established goals. Judy may at times be seen by a PTA as part of the Rehab Team.       I certify the need for these services furnished under this plan of treatment and while under my care.         ___________________________________  Physician/Referring Practitioner        _________________  Date of Signature

## 2018-08-08 PROBLEM — G89.29 CHRONIC PAIN OF RIGHT KNEE: Status: ACTIVE | Noted: 2018-08-08

## 2018-08-08 PROBLEM — R29.898 DECREASED STRENGTH OF LOWER EXTREMITY: Status: ACTIVE | Noted: 2018-08-08

## 2018-08-08 PROBLEM — M25.561 CHRONIC PAIN OF RIGHT KNEE: Status: ACTIVE | Noted: 2018-08-08

## 2018-08-08 PROBLEM — M25.669 DECREASED RANGE OF MOTION (ROM) OF KNEE: Status: ACTIVE | Noted: 2018-08-08

## 2018-08-13 ENCOUNTER — CLINICAL SUPPORT (OUTPATIENT)
Dept: REHABILITATION | Facility: HOSPITAL | Age: 83
End: 2018-08-13
Payer: COMMERCIAL

## 2018-08-13 DIAGNOSIS — M25.561 CHRONIC PAIN OF RIGHT KNEE: ICD-10-CM

## 2018-08-13 DIAGNOSIS — R29.898 DECREASED STRENGTH OF LOWER EXTREMITY: ICD-10-CM

## 2018-08-13 DIAGNOSIS — G89.29 CHRONIC PAIN OF RIGHT KNEE: ICD-10-CM

## 2018-08-13 DIAGNOSIS — M25.669 DECREASED RANGE OF MOTION (ROM) OF KNEE: ICD-10-CM

## 2018-08-13 PROCEDURE — 97110 THERAPEUTIC EXERCISES: CPT | Mod: PN

## 2018-08-13 PROCEDURE — 97140 MANUAL THERAPY 1/> REGIONS: CPT | Mod: PN

## 2018-08-13 NOTE — PROGRESS NOTES
[facility] Physical Therapy Daily Note    Name: Judy Thompson  Clinic Number: [id number]  Date of Treatment: 8/13/2018   Diagnosis:   Encounter Diagnoses   Name Primary?    Chronic pain of right knee     Decreased strength of lower extremity     Decreased range of motion (ROM) of knee      Visit number: 2  Start date: 8/6/18  POC End date: 10/1/18    Time in: 3:03pm  Time Out: 4:00pm  Total Treatment Time: 55 50 minutes    Precautions: osteoporosis    Subjective:    Judy reports continued pain to R knee more so when standing. She reports the pain is a nagging aching pain that is the worse with standing longer periods of time. Patient reports their pain to be 4/10 on a 0-10 scale with 0 being no pain and 10 being the worst pain imaginable.    Objective    Pt received manual therapy techniques to R knee including joint mobilization into patellofemoral med/lat and sup/inf glides and soft tissue mobilization to popliteal fossa and patellar tendon for a total of 25 minutes.     Judy received therapeutic exercises to develop strength, endurance and flexibility for 25 minutes including:   Quad set over towel roll x 15  Quad set of 6 inch red bolster x 15  SLR x 5 - demonstrates extensor lag  Standing gastroc stretch on stairs on R 3 x 30 seconds  Standing weight shift in mirror for increased acceptance to R LE x 20  Standing heel raise in parallel bars with emphasis on weight shift onto R LE x 20  Seated HS stretch 3 x 30 seconds    Written Home Exercises Provided: continue with current HEP with emphasis on isolating quad and no compensation of gluts. Also add yellow t-band  Pt demo good understanding of the education provided. Judy demonstrated good return demonstration of activities.     Assessment:   Pt required several verbal and tactile cues to perform quad set without compensation of gluts. She was progressed with exercises without reports of increased pain to R knee. She presents  with crepitus with quad sets which improved some with manual techniques.     Pt will continue to benefit from skilled PT intervention. Medical Necessity is demonstrated by:  Pain limits function of effected part for some activities, Unable to participate fully in daily activities, Requires skilled supervision to complete and progress HEP and Weakness.    Patient is making good progress towards established goals.    New/Revised goals: none  Short Term Goals:  4 weeks  1. Pt will present with increased R knee flexion by 5 degrees.    2. Pt will present with decreased inflammation to R patellar tendon to allow for increased patellar mobility and decreased pain with standing for pain level of 6/10.  3. Pt will present with increased R knee ext to equal L for increased ease with heel strike during ambulation.  4. Pt will present with increased hip abd MMT by one half grade for increased stability during ambulation and decreased stress to R knee.   5. Pt will present with increased R knee MMT into flex and ext to 5/5 for increased stability with ambulation.   6. Pt will present with increased HS length on R by 5 degrees for decreased stress to R knee.      Long Term Goals: 6-8 weeks  1. Pt will present with increased R knee flexion by 5 degrees.    2. Pt will present with decreased inflammation to R patellar tendon to allow for increased patellar mobility and decreased pain with standing for pain level of 6/10.  3. Pt will present with increased R knee ext to equal L for increased ease with heel strike during ambulation.  4. Pt will present with increased hip abd MMT by one half grade for increased stability during ambulation and decreased stress to R knee.   5. Pt will present with increased R knee MMT into flex and ext to 5/5 for increased stability with ambulation.   6. Pt will be independent with HEP and self management of symptoms.      Plan:  Continue with established Plan of Care towards PT goals.

## 2018-08-15 ENCOUNTER — CLINICAL SUPPORT (OUTPATIENT)
Dept: REHABILITATION | Facility: HOSPITAL | Age: 83
End: 2018-08-15
Payer: COMMERCIAL

## 2018-08-15 DIAGNOSIS — R29.898 DECREASED STRENGTH OF LOWER EXTREMITY: ICD-10-CM

## 2018-08-15 DIAGNOSIS — M25.561 CHRONIC PAIN OF RIGHT KNEE: ICD-10-CM

## 2018-08-15 DIAGNOSIS — G89.29 CHRONIC PAIN OF RIGHT KNEE: ICD-10-CM

## 2018-08-15 DIAGNOSIS — M25.669 DECREASED RANGE OF MOTION (ROM) OF KNEE: ICD-10-CM

## 2018-08-15 PROCEDURE — 97140 MANUAL THERAPY 1/> REGIONS: CPT | Mod: PN

## 2018-08-15 PROCEDURE — 97110 THERAPEUTIC EXERCISES: CPT | Mod: PN

## 2018-08-15 NOTE — PROGRESS NOTES
[facility] Physical Therapy Daily Note    Name: Judy Thompson  Clinic Number: [id number]  Date of Treatment: 8/15/2018   Diagnosis:   Encounter Diagnoses   Name Primary?    Chronic pain of right knee     Decreased strength of lower extremity     Decreased range of motion (ROM) of knee      Visit number: 3  Start date: 8/6/18  POC End date: 10/1/18    Time in: 12:00 pm  Time Out: 1:00 pm  Total Treatment Time: 50 minutes    Precautions: osteoporosis    Subjective:    Judy reports using a bag of frozen peas to help ice her R knee down yesterday and plans to do that more often. Reports continued pain into the R knee, steady and aching. Patient reports their pain to be 4-5/10 on a 0-10 scale with 0 being no pain and 10 being the worst pain imaginable.    Objective    Pt received manual therapy techniques to R knee including joint mobilization into patellofemoral med/lat and sup/inf glides and soft tissue mobilization to popliteal fossa and patellar tendon for a total of 20 minutes.     Judy received therapeutic exercises to develop strength, endurance and flexibility for 30 minutes including:   Quad set over towel roll x 20  Quad set of 6 inch red bolster x 15  SLR 2 x 5 towel roll under knee for improved quad activation - demonstrates extensor lag  Standing gastroc stretch on stairs on R 3 x 30 seconds  Standing weight shift in mirror for increased acceptance to R LE x 20  Standing heel raise in parallel bars with emphasis on weight shift onto R LE x 20   Sidestep x 2 laps in // bars (mirror for cues) - cues to keep R knee straighter  Seated HS stretch 3 x 30 seconds    Written Home Exercises Provided: continue with current HEP with emphasis on isolating quad and no compensation of gluts. Also add yellow t-band  Pt demo good understanding of the education provided. Judy demonstrated good return demonstration of activities.     Assessment:   Pt demonstrated overall good tolerance to  treatment this date. Pt required frequent cues for quad activation with quad set and SLR, improved some with towel under the knee. Able to perform additional sidesteps without onset of R knee pain. Patellar mobility improved following manual techniques.     Pt will continue to benefit from skilled PT intervention. Medical Necessity is demonstrated by:  Pain limits function of effected part for some activities, Unable to participate fully in daily activities, Requires skilled supervision to complete and progress HEP and Weakness.    Patient is making good progress towards established goals.    New/Revised goals: none  Short Term Goals:  4 weeks  1. Pt will present with increased R knee flexion by 5 degrees.    2. Pt will present with decreased inflammation to R patellar tendon to allow for increased patellar mobility and decreased pain with standing for pain level of 6/10.  3. Pt will present with increased R knee ext to equal L for increased ease with heel strike during ambulation.  4. Pt will present with increased hip abd MMT by one half grade for increased stability during ambulation and decreased stress to R knee.   5. Pt will present with increased R knee MMT into flex and ext to 5/5 for increased stability with ambulation.   6. Pt will present with increased HS length on R by 5 degrees for decreased stress to R knee.      Long Term Goals: 6-8 weeks  1. Pt will present with increased R knee flexion by 5 degrees.    2. Pt will present with decreased inflammation to R patellar tendon to allow for increased patellar mobility and decreased pain with standing for pain level of 6/10.  3. Pt will present with increased R knee ext to equal L for increased ease with heel strike during ambulation.  4. Pt will present with increased hip abd MMT by one half grade for increased stability during ambulation and decreased stress to R knee.   5. Pt will present with increased R knee MMT into flex and ext to 5/5 for increased  stability with ambulation.   6. Pt will be independent with HEP and self management of symptoms.      Plan:  Continue with established Plan of Care towards PT goals.

## 2018-08-20 ENCOUNTER — CLINICAL SUPPORT (OUTPATIENT)
Dept: REHABILITATION | Facility: HOSPITAL | Age: 83
End: 2018-08-20
Payer: COMMERCIAL

## 2018-08-20 DIAGNOSIS — M25.561 CHRONIC PAIN OF RIGHT KNEE: ICD-10-CM

## 2018-08-20 DIAGNOSIS — R29.898 DECREASED STRENGTH OF LOWER EXTREMITY: ICD-10-CM

## 2018-08-20 DIAGNOSIS — M25.669 DECREASED RANGE OF MOTION (ROM) OF KNEE: ICD-10-CM

## 2018-08-20 DIAGNOSIS — G89.29 CHRONIC PAIN OF RIGHT KNEE: ICD-10-CM

## 2018-08-20 PROCEDURE — 97140 MANUAL THERAPY 1/> REGIONS: CPT | Mod: PN

## 2018-08-20 PROCEDURE — 97110 THERAPEUTIC EXERCISES: CPT | Mod: PN

## 2018-08-20 NOTE — PROGRESS NOTES
[facility] Out[facility] Physical Therapy Daily Note    Name: Judy Thompson  Clinic Number: [id number]  Date of Treatment: 8/20/2018   Diagnosis:   Encounter Diagnoses   Name Primary?    Chronic pain of right knee     Decreased strength of lower extremity     Decreased range of motion (ROM) of knee      Visit number: 4  Start date: 8/6/18  POC End date: 10/1/18    Time in: 3:00 pm  Time Out: 3:55 pm  Total Treatment Time: 50 minutes    Precautions: osteoporosis    Subjective:    Judy reports continued use of head and frozen peas to help with her R knee pain. States she is having a little more pain into the R knee today from rushing to get over to therapy from work. Patient reports their pain to be 5/10 on a 0-10 scale with 0 being no pain and 10 being the worst pain imaginable.    Objective    Pt received manual therapy techniques to R knee including joint mobilization into patellofemoral med/lat and sup/inf glides and soft tissue mobilization to popliteal fossa and patellar tendon for a total of 15 minutes.     Judy received therapeutic exercises to develop strength, endurance and flexibility for 35 minutes including:   Quad set over towel roll x 20  Quad set of 6 inch red bolster x 15  SLR 2 x 5 towel roll under knee for improved quad activation - demonstrates extensor lag   Sup bridge x 10  Standing gastroc stretch on stairs on R 3 x 30 seconds  Standing weight shift in mirror for increased acceptance to R LE x 20  Standing heel raise in parallel bars with emphasis on weight shift onto R LE x 20  Sidestep x 2 laps in // bars (mirror for cues) - cues to keep R knee straighter   Standing march with mirror for cues of equal weight bearing x 10 alternating  Seated HS stretch 3 x 30 seconds    Pt received cold pack to R knee x 5 minutes for pain and inflammation.    Written Home Exercises Provided: continue with current HEP with emphasis on isolating quad and no compensation of gluts.  Pt demo good  understanding of the education provided. Judy demonstrated good return demonstration of activities.     Assessment:   Pt with overall good tolerance to treatment this date. Pt denies increased soreness or pain post treatment. LE strength and stability is slowly improving with supine exercises, crepitus palpated with quad sets with towel and bolster. Patellar mobility improved following manual techniques however crepitus continued. Able to perform additional standing march with cues to maintain upright posture.    Pt will continue to benefit from skilled PT intervention. Medical Necessity is demonstrated by:  Pain limits function of effected part for some activities, Unable to participate fully in daily activities, Requires skilled supervision to complete and progress HEP and Weakness.    Patient is making good progress towards established goals.    New/Revised goals: none  Short Term Goals:  4 weeks  1. Pt will present with increased R knee flexion by 5 degrees.    2. Pt will present with decreased inflammation to R patellar tendon to allow for increased patellar mobility and decreased pain with standing for pain level of 6/10.  3. Pt will present with increased R knee ext to equal L for increased ease with heel strike during ambulation.  4. Pt will present with increased hip abd MMT by one half grade for increased stability during ambulation and decreased stress to R knee.   5. Pt will present with increased R knee MMT into flex and ext to 5/5 for increased stability with ambulation.   6. Pt will present with increased HS length on R by 5 degrees for decreased stress to R knee.      Long Term Goals: 6-8 weeks  1. Pt will present with increased R knee flexion by 5 degrees.    2. Pt will present with decreased inflammation to R patellar tendon to allow for increased patellar mobility and decreased pain with standing for pain level of 6/10.  3. Pt will present with increased R knee ext to equal L for increased ease  with heel strike during ambulation.  4. Pt will present with increased hip abd MMT by one half grade for increased stability during ambulation and decreased stress to R knee.   5. Pt will present with increased R knee MMT into flex and ext to 5/5 for increased stability with ambulation.   6. Pt will be independent with HEP and self management of symptoms.      Plan:  Continue with established Plan of Care towards PT goals.

## 2018-08-22 ENCOUNTER — HOSPITAL ENCOUNTER (OUTPATIENT)
Dept: RADIOLOGY | Facility: HOSPITAL | Age: 83
Discharge: HOME | End: 2018-08-22
Attending: NURSE PRACTITIONER
Payer: COMMERCIAL

## 2018-08-22 DIAGNOSIS — M81.0 AGE-RELATED OSTEOPOROSIS WITHOUT CURRENT PATHOLOGICAL FRACTURE: ICD-10-CM

## 2018-08-22 DIAGNOSIS — C50.911 MALIGNANT NEOPLASM OF RIGHT BREAST IN FEMALE, ESTROGEN RECEPTOR POSITIVE, UNSPECIFIED SITE OF BREAST: ICD-10-CM

## 2018-08-22 DIAGNOSIS — Z17.0 MALIGNANT NEOPLASM OF RIGHT BREAST IN FEMALE, ESTROGEN RECEPTOR POSITIVE, UNSPECIFIED SITE OF BREAST: ICD-10-CM

## 2018-08-22 PROCEDURE — 71046 X-RAY EXAM CHEST 2 VIEWS: CPT | Mod: 26,,, | Performed by: RADIOLOGY

## 2018-08-22 PROCEDURE — 77080 DXA BONE DENSITY AXIAL: CPT | Mod: 26,,, | Performed by: RADIOLOGY

## 2018-08-22 PROCEDURE — 71046 X-RAY EXAM CHEST 2 VIEWS: CPT | Mod: TC,FY,PO

## 2018-08-22 PROCEDURE — 77062 BREAST TOMOSYNTHESIS BI: CPT | Mod: TC,PO

## 2018-08-22 PROCEDURE — 77066 DX MAMMO INCL CAD BI: CPT | Mod: 26,,, | Performed by: RADIOLOGY

## 2018-08-22 PROCEDURE — 77080 DXA BONE DENSITY AXIAL: CPT | Mod: TC,PO

## 2018-08-22 PROCEDURE — 77062 BREAST TOMOSYNTHESIS BI: CPT | Mod: 26,,, | Performed by: RADIOLOGY

## 2018-08-23 ENCOUNTER — CLINICAL SUPPORT (OUTPATIENT)
Dept: REHABILITATION | Facility: HOSPITAL | Age: 83
End: 2018-08-23
Payer: COMMERCIAL

## 2018-08-23 DIAGNOSIS — G89.29 CHRONIC PAIN OF RIGHT KNEE: ICD-10-CM

## 2018-08-23 DIAGNOSIS — M25.561 CHRONIC PAIN OF RIGHT KNEE: ICD-10-CM

## 2018-08-23 DIAGNOSIS — R29.898 DECREASED STRENGTH OF LOWER EXTREMITY: ICD-10-CM

## 2018-08-23 DIAGNOSIS — M25.669 DECREASED RANGE OF MOTION (ROM) OF KNEE: ICD-10-CM

## 2018-08-23 PROCEDURE — 97110 THERAPEUTIC EXERCISES: CPT | Mod: PN

## 2018-08-23 PROCEDURE — 97140 MANUAL THERAPY 1/> REGIONS: CPT | Mod: PN

## 2018-08-23 NOTE — PROGRESS NOTES
[facility] Physical Therapy Daily Note    Name: Judy Thompson  Clinic Number: [id number]  Date of Treatment: 8/23/2018   Diagnosis:   Encounter Diagnoses   Name Primary?    Chronic pain of right knee     Decreased strength of lower extremity     Decreased range of motion (ROM) of knee      Visit number: 5  Start date: 8/6/18  POC End date: 10/1/18    Time in: 2:00 pm  Time Out: 2:55 pm  Total Treatment Time: 45 minutes    Precautions: osteoporosis    Subjective:    Judy reports her R knee continues to bother her. She reports ice is helping some. . Patient reports their pain to be 5/10 on a 0-10 scale with 0 being no pain and 10 being the worst pain imaginable.    Objective    Pt received manual therapy techniques to R knee including joint mobilization into patellofemoral med/lat and sup/inf glides and soft tissue mobilization to popliteal fossa and patellar tendon for a total of 10 minutes.     Judy received therapeutic exercises to develop strength, endurance and flexibility for 35 minutes including:   SAQ over towel roll x 20  SAQ over 6 inch red bolster x 15  SLR 2 x 5 demonstrates extensor lag  Sup bridge with YTB for hip iso x 10  Standing gastroc stretch B runner stretch 3 x 30 seconds  Standing weight shift in mirror for increased acceptance to R LE x 20  Standing heel raise in parallel bars with emphasis on weight shift onto R LE x 20  Sidestep x 2 laps in // bars (mirror for cues)   Seated HS stretch 3 x 30 seconds    Pt received cold pack to R knee x 10 minutes for pain and inflammation.    Written Home Exercises Provided: none  Pt demo good understanding of the education provided. Judy demonstrated good return demonstration of activities.     Assessment:   Pt presents with decreased patellar mobility in all directions; improved with manual techniques and after exercises. She continues with compensation of gluts for quad sets; performed SAQ to increased activation of quads.      Pt will continue to benefit from skilled PT intervention. Medical Necessity is demonstrated by:  Pain limits function of effected part for some activities, Unable to participate fully in daily activities, Requires skilled supervision to complete and progress HEP and Weakness.    Patient is making good progress towards established goals.    New/Revised goals: none  Short Term Goals:  4 weeks  1. Pt will present with increased R knee flexion by 5 degrees.    2. Pt will present with decreased inflammation to R patellar tendon to allow for increased patellar mobility and decreased pain with standing for pain level of 6/10.  3. Pt will present with increased R knee ext to equal L for increased ease with heel strike during ambulation.  4. Pt will present with increased hip abd MMT by one half grade for increased stability during ambulation and decreased stress to R knee.   5. Pt will present with increased R knee MMT into flex and ext to 5/5 for increased stability with ambulation.   6. Pt will present with increased HS length on R by 5 degrees for decreased stress to R knee.      Long Term Goals: 6-8 weeks  1. Pt will present with increased R knee flexion by 5 degrees.    2. Pt will present with decreased inflammation to R patellar tendon to allow for increased patellar mobility and decreased pain with standing for pain level of 6/10.  3. Pt will present with increased R knee ext to equal L for increased ease with heel strike during ambulation.  4. Pt will present with increased hip abd MMT by one half grade for increased stability during ambulation and decreased stress to R knee.   5. Pt will present with increased R knee MMT into flex and ext to 5/5 for increased stability with ambulation.   6. Pt will be independent with HEP and self management of symptoms.      Plan:  Continue with established Plan of Care towards PT goals.

## 2018-08-27 ENCOUNTER — CLINICAL SUPPORT (OUTPATIENT)
Dept: REHABILITATION | Facility: HOSPITAL | Age: 83
End: 2018-08-27
Payer: COMMERCIAL

## 2018-08-27 DIAGNOSIS — G89.29 CHRONIC PAIN OF RIGHT KNEE: ICD-10-CM

## 2018-08-27 DIAGNOSIS — M25.561 CHRONIC PAIN OF RIGHT KNEE: ICD-10-CM

## 2018-08-27 DIAGNOSIS — M25.669 DECREASED RANGE OF MOTION (ROM) OF KNEE: ICD-10-CM

## 2018-08-27 DIAGNOSIS — R29.898 DECREASED STRENGTH OF LOWER EXTREMITY: ICD-10-CM

## 2018-08-27 PROCEDURE — 97140 MANUAL THERAPY 1/> REGIONS: CPT | Mod: PN

## 2018-08-27 PROCEDURE — 97110 THERAPEUTIC EXERCISES: CPT | Mod: PN

## 2018-08-27 NOTE — PROGRESS NOTES
"[facility] Out[facility] Physical Therapy Daily Note    Name: Judy Thompson  Clinic Number: [id number]  Date of Treatment: 8/27/2018   Diagnosis:   Encounter Diagnoses   Name Primary?    Chronic pain of right knee     Decreased strength of lower extremity     Decreased range of motion (ROM) of knee      Visit number: 6  Start date: 8/6/18  POC End date: 10/1/18    Time in: 3:00 pm  Time Out: 3:55 pm  Total Treatment Time: 45 minutes    Precautions: osteoporosis    Subjective:    Judy reports icing her knee over the weekend most of Saturday and her knee is feeling better. Patient reports their pain to be 3/10 on a 0-10 scale with 0 being no pain and 10 being the worst pain imaginable. She reports less pain during treatment and just felt "wobbly" during treatment not much pain    Objective    Pt received manual therapy techniques to R knee including joint mobilization into patellofemoral med/lat and sup/inf glides and soft tissue mobilization to popliteal fossa and patellar tendon for a total of 10 minutes.     Judy received therapeutic exercises to develop strength, endurance and flexibility for 35 minutes including:   SAQ flat x 30  SAQ over towel roll x 20  SAQ over 6 inch red bolster x 15  SLR 2 x 10 demonstrates extensor lag  Sup bridge with YTB for hip iso x 10  Heel slide x 15   Supine hip flexor stretch over EOT 3 x 30 seconds  Standing weight shift increased acceptance to R LE x 20  Side step tap onto 4 inch step x 10 B  Sidestep x 2 laps in // bars (mirror for cues)  Standing heel raise in parallel bars with emphasis on weight shift onto R LE x 20  Heel strike x 10 B in parallel bars  Standing gastroc stretch B runner stretch in parallel bars 3 x 30 seconds  HS stretch seated on R 3 x 30 seconds    Pt received cold pack to R knee x 10 minutes for pain and inflammation.    Written Home Exercises Provided: none  Pt demo good understanding of the education provided. Judy demonstrated good " return demonstration of activities.     Assessment:   Pt presents with improved patellar mobility to R knee into superior and inferior glides compared to last visit. Pt presents with decreased glut compensation. She continued with extensor lag to R LE with SLR. Improved patellar joint mobility with     Pt will continue to benefit from skilled PT intervention. Medical Necessity is demonstrated by:  Pain limits function of effected part for some activities, Unable to participate fully in daily activities, Requires skilled supervision to complete and progress HEP and Weakness.    Patient is making good progress towards established goals.    New/Revised goals: none  Short Term Goals:  4 weeks  1. Pt will present with increased R knee flexion by 5 degrees.    2. Pt will present with decreased inflammation to R patellar tendon to allow for increased patellar mobility and decreased pain with standing for pain level of 6/10.  3. Pt will present with increased R knee ext to equal L for increased ease with heel strike during ambulation.  4. Pt will present with increased hip abd MMT by one half grade for increased stability during ambulation and decreased stress to R knee.   5. Pt will present with increased R knee MMT into flex and ext to 5/5 for increased stability with ambulation.   6. Pt will present with increased HS length on R by 5 degrees for decreased stress to R knee.      Long Term Goals: 6-8 weeks  1. Pt will present with increased R knee flexion by 5 degrees.    2. Pt will present with decreased inflammation to R patellar tendon to allow for increased patellar mobility and decreased pain with standing for pain level of 6/10.  3. Pt will present with increased R knee ext to equal L for increased ease with heel strike during ambulation.  4. Pt will present with increased hip abd MMT by one half grade for increased stability during ambulation and decreased stress to R knee.   5. Pt will present with increased R knee  MMT into flex and ext to 5/5 for increased stability with ambulation.   6. Pt will be independent with HEP and self management of symptoms.    Plan:  Continue with established Plan of Care towards PT goals.

## 2018-08-30 ENCOUNTER — CLINICAL SUPPORT (OUTPATIENT)
Dept: REHABILITATION | Facility: HOSPITAL | Age: 83
End: 2018-08-30
Payer: COMMERCIAL

## 2018-08-30 DIAGNOSIS — G89.29 CHRONIC PAIN OF RIGHT KNEE: ICD-10-CM

## 2018-08-30 DIAGNOSIS — M25.561 CHRONIC PAIN OF RIGHT KNEE: ICD-10-CM

## 2018-08-30 DIAGNOSIS — M25.669 DECREASED RANGE OF MOTION (ROM) OF KNEE: ICD-10-CM

## 2018-08-30 DIAGNOSIS — R29.898 DECREASED STRENGTH OF LOWER EXTREMITY: ICD-10-CM

## 2018-08-30 PROCEDURE — 97110 THERAPEUTIC EXERCISES: CPT | Mod: PN

## 2018-08-30 NOTE — PROGRESS NOTES
"[facility] Physical Therapy Daily Note    Name: Judy Thompson  Clinic Number: [id number]  Date of Treatment: 8/30/2018   Diagnosis:   Encounter Diagnoses   Name Primary?    Chronic pain of right knee     Decreased strength of lower extremity     Decreased range of motion (ROM) of knee      Visit number: 7  Start date: 8/6/18  POC End date: 10/1/18    Time in: 3:05 pm  Time Out: 5:00 pm  Total Treatment Time: 55 minutes  Total billable time: 30    Precautions: osteoporosis    Subjective:    Judy reports icing her knee over the weekend most of Saturday and her knee is feeling better. Patient reports their pain to be 3/10 on a 0-10 scale with 0 being no pain and 10 being the worst pain imaginable. She reports less pain during treatment and just felt "wobbly" during treatment not much pain    Objective    Pt received manual therapy techniques to R knee including joint mobilization into patellofemoral med/lat and sup/inf glides and soft tissue mobilization to popliteal fossa and patellar tendon for a total of 10 minutes.     Judy received therapeutic exercises to develop strength, endurance and flexibility for 55 minutes including:   SAQ flat x 30  SAQ over towel roll x 20  SAQ over 6 inch red bolster x 15  SLR 2 x 10 demonstrates extensor lag  Sup bridge with YTB for hip iso x 10   Supine clamshell YTB x20  NP Heel slide x 15   Supine hip flexor stretch over EOT 3 x 30 seconds    Standing weight shift increased acceptance to R LE x 20  Side step tap onto 4 inch step x 15 B  Sidestep x 2 laps in // bars (mirror for cues)  Standing heel raise in parallel bars with emphasis on weight shift onto R LE x 20  Heel strike x 10 B in parallel bars  Standing gastroc stretch B runner stretch in parallel bars 3 x 30 seconds  HS stretch seated on R 3 x 30 seconds    Pt deferred due to needing to get home - Pt received cold pack to R knee x 10 minutes for pain and inflammation.    Written Home Exercises " Provided: none  Pt demo good understanding of the education provided. Judy demonstrated good return demonstration of activities.     Assessment:   Pt demonstrated overall good tolerance to treatment this date. She continues with R quad weakness and extensor lag with SLR. Mild improvement of WB onto R LE with standing activities in // bars.  Pt will continue to benefit from skilled PT intervention. Medical Necessity is demonstrated by:  Pain limits function of effected part for some activities, Unable to participate fully in daily activities, Requires skilled supervision to complete and progress HEP and Weakness.    Patient is making good progress towards established goals.    New/Revised goals: none  Short Term Goals:  4 weeks  1. Pt will present with increased R knee flexion by 5 degrees.    2. Pt will present with decreased inflammation to R patellar tendon to allow for increased patellar mobility and decreased pain with standing for pain level of 6/10.  3. Pt will present with increased R knee ext to equal L for increased ease with heel strike during ambulation.  4. Pt will present with increased hip abd MMT by one half grade for increased stability during ambulation and decreased stress to R knee.   5. Pt will present with increased R knee MMT into flex and ext to 5/5 for increased stability with ambulation.   6. Pt will present with increased HS length on R by 5 degrees for decreased stress to R knee.      Long Term Goals: 6-8 weeks  1. Pt will present with increased R knee flexion by 5 degrees.    2. Pt will present with decreased inflammation to R patellar tendon to allow for increased patellar mobility and decreased pain with standing for pain level of 6/10.  3. Pt will present with increased R knee ext to equal L for increased ease with heel strike during ambulation.  4. Pt will present with increased hip abd MMT by one half grade for increased stability during ambulation and decreased stress to R knee.    5. Pt will present with increased R knee MMT into flex and ext to 5/5 for increased stability with ambulation.   6. Pt will be independent with HEP and self management of symptoms.    Plan:  Continue with established Plan of Care towards PT goals.

## 2018-09-06 ENCOUNTER — CLINICAL SUPPORT (OUTPATIENT)
Dept: REHABILITATION | Facility: HOSPITAL | Age: 83
End: 2018-09-06
Payer: COMMERCIAL

## 2018-09-06 DIAGNOSIS — M25.669 DECREASED RANGE OF MOTION (ROM) OF KNEE: ICD-10-CM

## 2018-09-06 DIAGNOSIS — G89.29 CHRONIC PAIN OF RIGHT KNEE: ICD-10-CM

## 2018-09-06 DIAGNOSIS — R29.898 DECREASED STRENGTH OF LOWER EXTREMITY: ICD-10-CM

## 2018-09-06 DIAGNOSIS — M25.561 CHRONIC PAIN OF RIGHT KNEE: ICD-10-CM

## 2018-09-06 PROCEDURE — 97110 THERAPEUTIC EXERCISES: CPT | Mod: PN

## 2018-09-06 NOTE — PROGRESS NOTES
[facility] Physical Therapy Daily Note    Name: Judy Thompson  Clinic Number: [id number]  Date of Treatment: 9/6/2018   Diagnosis:   Encounter Diagnoses   Name Primary?    Chronic pain of right knee     Decreased strength of lower extremity     Decreased range of motion (ROM) of knee      Visit number: 8  Start date: 8/6/18  POC End date: 10/1/18    Time in: 10:55 am  Time Out: 12:00 pm  Total Treatment Time: 55 minutes  Total billable time: 30    Precautions: osteoporosis    Subjective:    Judy she has been performing HEP more regularly and her knee is feeling much better. Patient reports their pain to be 1-2/10 on a 0-10 scale with 0 being no pain and 10 being the worst pain imaginable.    Objective    Knee Left Right   Flexion 143 141   Extention +2 0   *Tested with patient supine     Hip MMT  Flexion: L 4/5, R 4-/5  Abd: 4/5 B  Ext: 4/5 B     Knee Strength Left Right   Flexion 5/5 4+/5   Extension 5/5 4+/5       HS length   R 72  L 75     Pt received manual therapy techniques to R knee including joint mobilization into patellofemoral med/lat and sup/inf glides and soft tissue mobilization to popliteal fossa and patellar tendon for a total of 5 minutes.     Judy received therapeutic exercises to develop strength, endurance and flexibility for 40 minutes including:   SAQ flat x 30  SAQ over towel roll x 20  SAQ over 6 inch red bolster x 15  Sup bridge with YTB for hip iso x 10  Supine clamshell YTB x20  Supine hip flexor stretch over EOT 3 x 30 seconds  Sidestep x 4 laps in // bars (mirror for cues)  TKE YTB 3 x 10  Step up with opposite hip flexion x 10 B     Pt received cold pack to R knee x 10 minutes for pain and inflammation.    Written Home Exercises Provided: none  Pt demo good understanding of the education provided. Judy demonstrated good return demonstration of activities.     Assessment: Pt presents with improved R knee patellar mobility as well as R knee AROM. She  continues with limitations in R knee hyperextention to equal L and with strength limitations to hips and R knee. She required verbal and tactile cues to perform TKE and required instruction on how to perform step one after swithing from L LE to R LE.     Pt will continue to benefit from skilled PT intervention. Medical Necessity is demonstrated by:  Pain limits function of effected part for some activities, Unable to participate fully in daily activities, Requires skilled supervision to complete and progress HEP and Weakness.    Patient is making good progress towards established goals.    New/Revised goals: none  Short Term Goals:  4 weeks  1. Pt will present with increased R knee flexion by 5 degrees.  - met  2. Pt will present with decreased inflammation to R patellar tendon to allow for increased patellar mobility and decreased pain with standing for pain level of 6/10. - met  3. Pt will present with increased R knee ext to equal L for increased ease with heel strike during ambulation. - in progress  4. Pt will present with increased hip abd MMT by one half grade for increased stability during ambulation and decreased stress to R knee. - med   5. Pt will present with increased R knee MMT into flex and ext to 5/5 for increased stability with ambulation. - in progress  6. Pt will present with increased HS length on R by 5 degrees for decreased stress to R knee. - met     Long Term Goals: 6-8 weeks  1. Pt will present with increased R knee flexion by 5 degrees.    2. Pt will present with decreased inflammation to R patellar tendon to allow for increased patellar mobility and decreased pain with standing for pain level of 6/10.  3. Pt will present with increased R knee ext to equal L for increased ease with heel strike during ambulation.  4. Pt will present with increased hip abd MMT by one half grade for increased stability during ambulation and decreased stress to R knee.   5. Pt will present with increased R knee  MMT into flex and ext to 5/5 for increased stability with ambulation.   6. Pt will be independent with HEP and self management of symptoms.    Plan:  Continue with established Plan of Care towards PT goals.

## 2018-09-10 ENCOUNTER — CLINICAL SUPPORT (OUTPATIENT)
Dept: REHABILITATION | Facility: HOSPITAL | Age: 83
End: 2018-09-10
Payer: COMMERCIAL

## 2018-09-10 DIAGNOSIS — R29.898 DECREASED STRENGTH OF LOWER EXTREMITY: ICD-10-CM

## 2018-09-10 DIAGNOSIS — G89.29 CHRONIC PAIN OF RIGHT KNEE: ICD-10-CM

## 2018-09-10 DIAGNOSIS — M25.669 DECREASED RANGE OF MOTION (ROM) OF KNEE: ICD-10-CM

## 2018-09-10 DIAGNOSIS — M25.561 CHRONIC PAIN OF RIGHT KNEE: ICD-10-CM

## 2018-09-10 PROCEDURE — 97110 THERAPEUTIC EXERCISES: CPT | Mod: PN

## 2018-09-10 NOTE — PROGRESS NOTES
[facility] Out[facility] Physical Therapy Daily Note    Name: Judy Thompson  Clinic Number: [id number]  Date of Treatment: 9/10/2018   Diagnosis:   Encounter Diagnoses   Name Primary?    Chronic pain of right knee     Decreased strength of lower extremity     Decreased range of motion (ROM) of knee      Visit number: 9  Start date: 8/6/18  POC End date: 10/1/18    Time in: 3:00 pm  Time Out: 3:20 pm  Total Treatment Time: 20 minutes  Total billable time: 20    Precautions: osteoporosis    Subjective:    Judy reports mild improvement of symptoms but continued occasional pain into the R knee. Has been doing exercises at home. Patient reports their pain to be 2/10 on a 0-10 scale with 0 being no pain and 10 being the worst pain imaginable.    Objective     Performed with BERYL Munguia PT:Pt received manual therapy techniques to R knee including joint mobilization into patellofemoral med/lat and sup/inf glides and soft tissue mobilization to popliteal fossa and patellar tendon for a total of 5 minutes.     Judy received therapeutic exercises to develop strength, endurance and flexibility for 40 minutes including:   SAQ flat x 30  SAQ over towel roll x 20  SAQ over 6 inch red bolster x 15  Sup bridge with YTB for hip iso x 10  Supine clamshell YTB x20    Below exercises performed with BERYL Munguia PT  Supine hip flexor stretch over EOT 3 x 30 seconds  Sidestep x 4 laps in // bars (mirror for cues)  TKE YTB 3 x 10  Step up with opposite hip flexion x 10 B       Assessment:   Kitty tolerated treatment well overall this date without onset of pain or soreness. She continues with B hip weakness, L LE appears more weak than R especially with supine clamshells. Cues for terminal knee extension with quad sets and SAQ.     Pt will continue to benefit from skilled PT intervention. Medical Necessity is demonstrated by:  Pain limits function of effected part for some activities, Unable to participate fully in daily  activities, Requires skilled supervision to complete and progress HEP and Weakness.    Patient is making good progress towards established goals.    New/Revised goals: none  Short Term Goals:  4 weeks  1. Pt will present with increased R knee flexion by 5 degrees.  - met  2. Pt will present with decreased inflammation to R patellar tendon to allow for increased patellar mobility and decreased pain with standing for pain level of 6/10. - met  3. Pt will present with increased R knee ext to equal L for increased ease with heel strike during ambulation. - in progress  4. Pt will present with increased hip abd MMT by one half grade for increased stability during ambulation and decreased stress to R knee. - med   5. Pt will present with increased R knee MMT into flex and ext to 5/5 for increased stability with ambulation. - in progress  6. Pt will present with increased HS length on R by 5 degrees for decreased stress to R knee. - met     Long Term Goals: 6-8 weeks  1. Pt will present with increased R knee flexion by 5 degrees.    2. Pt will present with decreased inflammation to R patellar tendon to allow for increased patellar mobility and decreased pain with standing for pain level of 6/10.  3. Pt will present with increased R knee ext to equal L for increased ease with heel strike during ambulation.  4. Pt will present with increased hip abd MMT by one half grade for increased stability during ambulation and decreased stress to R knee.   5. Pt will present with increased R knee MMT into flex and ext to 5/5 for increased stability with ambulation.   6. Pt will be independent with HEP and self management of symptoms.    Plan:  Continue with established Plan of Care towards PT goals.

## 2018-09-10 NOTE — PROGRESS NOTES
[facility] Physical Therapy Daily Note    Name: Judy Thompson  Clinic Number: [id number]  Date of Treatment: 9/10/2018   Diagnosis:   Encounter Diagnoses   Name Primary?    Chronic pain of right knee     Decreased strength of lower extremity     Decreased range of motion (ROM) of knee      Visit number: 9  Start date: 8/6/18  POC End date: 10/1/18    Time in: 3:00 pm  Time Out: 3:55 pm  Total Treatment Time: 45 minutes  Total billable time: 30    Precautions: osteoporosis    Subjective:    Judy reports her R knee is bothering her a little today, she has been running errands earlier. She reports overall her knee is feeling much better and she is performing HEP and icing regularly. Patient reports their pain to be 1-2/10 on a 0-10 scale with 0 being no pain and 10 being the worst pain imaginable.    Objective    Pt received manual therapy techniques to R knee including joint mobilization into patellofemoral med/lat and sup/inf glides and soft tissue mobilization to popliteal fossa and patellar tendon for a total of 5 minutes.     Judy received therapeutic exercises to develop strength, endurance and flexibility for 40 minutes including:   SAQ flat x 30  SAQ over towel roll x 20  SAQ over 6 inch red bolster x 15  Sup bridge with YTB for hip iso x 10  Supine clamshell YTB x20  Supine hip flexor stretch over EOT 3 x 30 seconds  Sidestep x 4 laps in // bars (mirror for cues)  Mini squats 2 x 10   TKE YTB 3 x 10  Step up with opposite hip flexion x 10 B   Side step tap onto 4 inch step 2 x 10 B  LAQ 2 x 10    Pt received cold pack to R knee x 8 minutes for pain and inflammation.    Written Home Exercises Provided: none  Pt demo good understanding of the education provided. Judy demonstrated good return demonstration of activities.     Assessment: Pt presents with slight increased in edema to R knee compared to previous visit, possibly due to increased activity with running errands today.  Continued improvement in R patellar mobility. She required cues for t-band and mini squat techniques.     Pt will continue to benefit from skilled PT intervention. Medical Necessity is demonstrated by:  Pain limits function of effected part for some activities, Unable to participate fully in daily activities, Requires skilled supervision to complete and progress HEP and Weakness.    Patient is making good progress towards established goals.    New/Revised goals: none  Short Term Goals:  4 weeks  1. Pt will present with increased R knee flexion by 5 degrees.  - met  2. Pt will present with decreased inflammation to R patellar tendon to allow for increased patellar mobility and decreased pain with standing for pain level of 6/10. - met  3. Pt will present with increased R knee ext to equal L for increased ease with heel strike during ambulation. - in progress  4. Pt will present with increased hip abd MMT by one half grade for increased stability during ambulation and decreased stress to R knee. - med   5. Pt will present with increased R knee MMT into flex and ext to 5/5 for increased stability with ambulation. - in progress  6. Pt will present with increased HS length on R by 5 degrees for decreased stress to R knee. - met     Long Term Goals: 6-8 weeks  1. Pt will present with increased R knee flexion by 5 degrees.    2. Pt will present with decreased inflammation to R patellar tendon to allow for increased patellar mobility and decreased pain with standing for pain level of 6/10.  3. Pt will present with increased R knee ext to equal L for increased ease with heel strike during ambulation.  4. Pt will present with increased hip abd MMT by one half grade for increased stability during ambulation and decreased stress to R knee.   5. Pt will present with increased R knee MMT into flex and ext to 5/5 for increased stability with ambulation.   6. Pt will be independent with HEP and self management of  symptoms.    Plan:  Continue with established Plan of Care towards PT goals.

## 2018-09-13 ENCOUNTER — CLINICAL SUPPORT (OUTPATIENT)
Dept: REHABILITATION | Facility: HOSPITAL | Age: 83
End: 2018-09-13
Payer: COMMERCIAL

## 2018-09-13 DIAGNOSIS — G89.29 CHRONIC PAIN OF RIGHT KNEE: ICD-10-CM

## 2018-09-13 DIAGNOSIS — M25.561 CHRONIC PAIN OF RIGHT KNEE: ICD-10-CM

## 2018-09-13 DIAGNOSIS — M25.669 DECREASED RANGE OF MOTION (ROM) OF KNEE: ICD-10-CM

## 2018-09-13 DIAGNOSIS — R29.898 DECREASED STRENGTH OF LOWER EXTREMITY: ICD-10-CM

## 2018-09-13 PROCEDURE — 97110 THERAPEUTIC EXERCISES: CPT | Mod: PN

## 2018-09-13 NOTE — PROGRESS NOTES
[facility] Out[facility] Physical Therapy Daily Note    Name: Judy Thompson  Clinic Number: [id number]  Date of Treatment: 9/13/2018   Diagnosis:   Encounter Diagnoses   Name Primary?    Chronic pain of right knee     Decreased strength of lower extremity     Decreased range of motion (ROM) of knee      Visit number: 10  Start date: 8/6/18  POC End date: 10/1/18    Time in: 3:04 pm  Time Out: 3:55 pm  Total Treatment Time: 50 minutes  Total billable time: 50    Precautions: osteoporosis    Subjective:    Judy reports her R knee is bothering her a little today, she has been running errands earlier. She reports overall her knee is feeling much better and she is performing HEP and icing regularly. Patient reports their pain to be 1-2/10 on a 0-10 scale with 0 being no pain and 10 being the worst pain imaginable.    Objective    Pt received manual therapy techniques to R knee including joint mobilization into patellofemoral med/lat and sup/inf glides and soft tissue mobilization to popliteal fossa and patellar tendon for a total of 5 minutes.     Judy received therapeutic exercises to develop strength, endurance and flexibility for 45 minutes including:   SAQ flat x 30  SAQ over towel roll x 20  SAQ over 6 inch red bolster x 20  Sup bridge with YTB for hip iso 2x10  Supine clamshell YTB x20 (use red next session)  Supine hip flexor stretch over EOT 3 x 30 seconds  Sidestep x 4 laps in // bars (mirror for cues)  Mini squats 2 x 10 (cues for proper technique)  TKE YTB 3 x 10  Step up onto 4 inch step with opposite hip flexion x 10 B   Side step tap onto 4 inch step 2 x 10 B  LAQ 2 x 10    Pt received cold pack to R knee x 10 minutes for pain and inflammation.    Written Home Exercises Provided: none, pt to continue with current written HEP.  Pt demo good understanding of the education provided. Judy demonstrated good return demonstration of activities.     Assessment:   Pt tolerated treatment well  overall this date without onset of pain or soreness. Mild muscle fatigue reported post treatment which resolved with cold pack to R knee. Pt presents with continued improvement of R patellar mobility, discomfort occasionally reported due to subpatellar crepitus.  She required cues for proper TKE and mini squat techniques.     Pt will continue to benefit from skilled PT intervention. Medical Necessity is demonstrated by:  Pain limits function of effected part for some activities, Unable to participate fully in daily activities, Requires skilled supervision to complete and progress HEP and Weakness.    Patient is making good progress towards established goals.    New/Revised goals: none  Short Term Goals:  4 weeks  1. Pt will present with increased R knee flexion by 5 degrees.  - met  2. Pt will present with decreased inflammation to R patellar tendon to allow for increased patellar mobility and decreased pain with standing for pain level of 6/10. - met  3. Pt will present with increased R knee ext to equal L for increased ease with heel strike during ambulation. - in progress  4. Pt will present with increased hip abd MMT by one half grade for increased stability during ambulation and decreased stress to R knee. - med   5. Pt will present with increased R knee MMT into flex and ext to 5/5 for increased stability with ambulation. - in progress  6. Pt will present with increased HS length on R by 5 degrees for decreased stress to R knee. - met     Long Term Goals: 6-8 weeks  1. Pt will present with increased R knee flexion by 5 degrees.    2. Pt will present with decreased inflammation to R patellar tendon to allow for increased patellar mobility and decreased pain with standing for pain level of 6/10.  3. Pt will present with increased R knee ext to equal L for increased ease with heel strike during ambulation.  4. Pt will present with increased hip abd MMT by one half grade for increased stability during ambulation  and decreased stress to R knee.   5. Pt will present with increased R knee MMT into flex and ext to 5/5 for increased stability with ambulation.   6. Pt will be independent with HEP and self management of symptoms.    Plan:  Continue with established Plan of Care towards PT goals.

## 2018-09-17 ENCOUNTER — CLINICAL SUPPORT (OUTPATIENT)
Dept: REHABILITATION | Facility: HOSPITAL | Age: 83
End: 2018-09-17
Payer: COMMERCIAL

## 2018-09-17 DIAGNOSIS — M25.561 CHRONIC PAIN OF RIGHT KNEE: ICD-10-CM

## 2018-09-17 DIAGNOSIS — R29.898 DECREASED STRENGTH OF LOWER EXTREMITY: ICD-10-CM

## 2018-09-17 DIAGNOSIS — G89.29 CHRONIC PAIN OF RIGHT KNEE: ICD-10-CM

## 2018-09-17 DIAGNOSIS — M25.669 DECREASED RANGE OF MOTION (ROM) OF KNEE: ICD-10-CM

## 2018-09-17 PROCEDURE — 97110 THERAPEUTIC EXERCISES: CPT | Mod: PN

## 2018-09-17 NOTE — PROGRESS NOTES
[facility] Physical Therapy Daily Note    Name: Judy Thompson  Clinic Number: [id number]  Date of Treatment: 9/17/2018   Diagnosis:   Encounter Diagnoses   Name Primary?    Chronic pain of right knee     Decreased strength of lower extremity     Decreased range of motion (ROM) of knee      Visit number: 11  Start date: 8/6/18  POC End date: 10/1/18    Time in: 3:12 pm (patient arrived late)  Time Out: 3:57 pm (patient states she has a time constraint)  Total Treatment Time: 45 minutes  Total billable time: 25    Precautions: osteoporosis    Subjective:    Judy reports minimal soreness following previous session but feeling fair today with only a little pain. Patient reports their pain to be 1/10 on a 0-10 scale with 0 being no pain and 10 being the worst pain imaginable.    Objective    Pt received manual therapy techniques to R knee including joint mobilization into patellofemoral med/lat and sup/inf glides and soft tissue mobilization to popliteal fossa and patellar tendon for a total of 2 minutes.     Judy received therapeutic exercises to develop strength, endurance and flexibility for 45 minutes including:   SAQ flat x 30  SAQ over towel roll x 20  SAQ over 6 inch red bolster x 20  Sup bridge with YTB for hip iso 2x10  Supine clamshell YTB x30 (use red next session)  Supine hip flexor stretch over EOT 3 x 30 seconds  LAQ 2 x 10  TKE YTB 3 x 10  Sidestep x 4 laps in // bars (mirror for cues)  Mini squats 2 x 10 (cues for proper technique)  Step up onto 4 inch step with opposite hip flexion x 10 B   Side step tap onto 4 inch step 2 x 10 B    Pt received cold pack to R knee x 10 minutes for pain and inflammation.    Written Home Exercises Provided: none, pt to continue with current written HEP.  Pt demo good understanding of the education provided. Judy demonstrated good return demonstration of activities.     Assessment:   Pt demonstrated overall good tolerance to treatment  without onset of pain. Mild tenderness reported with sup/inf glides of R patella. Able to perform increased repetitions of some exercises this date, and may benefit from increased resistance at next session. Occasional cues required with mini squats and forward step ups for proper technique.     Pt will continue to benefit from skilled PT intervention. Medical Necessity is demonstrated by:  Pain limits function of effected part for some activities, Unable to participate fully in daily activities, Requires skilled supervision to complete and progress HEP and Weakness.    Patient is making good progress towards established goals.    New/Revised goals: none  Short Term Goals:  4 weeks  1. Pt will present with increased R knee flexion by 5 degrees.  - met  2. Pt will present with decreased inflammation to R patellar tendon to allow for increased patellar mobility and decreased pain with standing for pain level of 6/10. - met  3. Pt will present with increased R knee ext to equal L for increased ease with heel strike during ambulation. - in progress  4. Pt will present with increased hip abd MMT by one half grade for increased stability during ambulation and decreased stress to R knee. - med   5. Pt will present with increased R knee MMT into flex and ext to 5/5 for increased stability with ambulation. - in progress  6. Pt will present with increased HS length on R by 5 degrees for decreased stress to R knee. - met     Long Term Goals: 6-8 weeks  1. Pt will present with increased R knee flexion by 5 degrees.    2. Pt will present with decreased inflammation to R patellar tendon to allow for increased patellar mobility and decreased pain with standing for pain level of 6/10.  3. Pt will present with increased R knee ext to equal L for increased ease with heel strike during ambulation.  4. Pt will present with increased hip abd MMT by one half grade for increased stability during ambulation and decreased stress to R knee.    5. Pt will present with increased R knee MMT into flex and ext to 5/5 for increased stability with ambulation.   6. Pt will be independent with HEP and self management of symptoms.    Plan:  Continue with established Plan of Care towards PT goals.

## 2018-09-19 ENCOUNTER — CLINICAL SUPPORT (OUTPATIENT)
Dept: REHABILITATION | Facility: HOSPITAL | Age: 83
End: 2018-09-19
Payer: COMMERCIAL

## 2018-09-19 DIAGNOSIS — R29.898 DECREASED STRENGTH OF LOWER EXTREMITY: ICD-10-CM

## 2018-09-19 DIAGNOSIS — G89.29 CHRONIC PAIN OF RIGHT KNEE: ICD-10-CM

## 2018-09-19 DIAGNOSIS — M25.669 DECREASED RANGE OF MOTION (ROM) OF KNEE: ICD-10-CM

## 2018-09-19 DIAGNOSIS — M25.561 CHRONIC PAIN OF RIGHT KNEE: ICD-10-CM

## 2018-09-19 PROCEDURE — 97110 THERAPEUTIC EXERCISES: CPT | Mod: PN

## 2018-09-19 NOTE — PROGRESS NOTES
"[facility] Physical Therapy Daily Note    Name: Judy Thompson  Clinic Number: [id number]  Date of Treatment: 9/19/2018   Diagnosis:   Encounter Diagnoses   Name Primary?    Chronic pain of right knee     Decreased strength of lower extremity     Decreased range of motion (ROM) of knee      Visit number: 12  Start date: 8/6/18  POC End date: 10/1/18    Time in: 3:03 pm  Time Out: 4:03 pm  Total Treatment Time: 60 minutes  Total billable time: 30    Precautions: osteoporosis    Subjective:    Judy reports improvement of symptoms to B knees this date. States she has had a "pretty good day". Patient reports their pain to be 0/10 on a 0-10 scale with 0 being no pain and 10 being the worst pain imaginable.    Objective    NP- Pt received manual therapy techniques to R knee including joint mobilization into patellofemoral med/lat and sup/inf glides and soft tissue mobilization to popliteal fossa and patellar tendon for a total of 2 minutes.     Judy received therapeutic exercises to develop strength, endurance and flexibility for 60 minutes including:   SAQ flat x 30  SAQ over towel roll x 20  SAQ over 6 inch red bolster x 20 2#   Sup bridge with YTB for hip iso 2x10  Supine clamshell YTB x30 (use red next session)  Supine hip flexor stretch over EOT 3 x 30 seconds  LAQ 2 x 10 with 3" hold  TKE YTB 3 x 10  Sidestep x 4 laps in // bars (mirror for cues)  Mini squats 2 x 10 (cues for proper technique)  Step up onto 4 inch step with opposite hip flexion x 10 R only today   Side step tap onto 4 inch step 2 x 10 R only today    Pt deferred to home: Pt received cold pack to R knee x 1 0minutes for pain and inflammation.    Written Home Exercises Provided: none, pt to continue with current written HEP.  Pt demo good understanding of the education provided. Judy demonstrated good return demonstration of activities.     Assessment:   Pt tolerated treatment well overall. Mild improvement of strength " noted with exercises this date without onset of pain. Occasional cues for R quad activation during standing activities, pain reported with terminal R knee extension.     Pt will continue to benefit from skilled PT intervention. Medical Necessity is demonstrated by:  Pain limits function of effected part for some activities, Unable to participate fully in daily activities, Requires skilled supervision to complete and progress HEP and Weakness.    Patient is making good progress towards established goals.    New/Revised goals: none  Short Term Goals:  4 weeks  1. Pt will present with increased R knee flexion by 5 degrees.  - met  2. Pt will present with decreased inflammation to R patellar tendon to allow for increased patellar mobility and decreased pain with standing for pain level of 6/10. - met  3. Pt will present with increased R knee ext to equal L for increased ease with heel strike during ambulation. - in progress  4. Pt will present with increased hip abd MMT by one half grade for increased stability during ambulation and decreased stress to R knee. - med   5. Pt will present with increased R knee MMT into flex and ext to 5/5 for increased stability with ambulation. - in progress  6. Pt will present with increased HS length on R by 5 degrees for decreased stress to R knee. - met     Long Term Goals: 6-8 weeks  1. Pt will present with increased R knee flexion by 5 degrees.    2. Pt will present with decreased inflammation to R patellar tendon to allow for increased patellar mobility and decreased pain with standing for pain level of 6/10.  3. Pt will present with increased R knee ext to equal L for increased ease with heel strike during ambulation.  4. Pt will present with increased hip abd MMT by one half grade for increased stability during ambulation and decreased stress to R knee.   5. Pt will present with increased R knee MMT into flex and ext to 5/5 for increased stability with ambulation.   6. Pt will  be independent with HEP and self management of symptoms.    Plan:  Continue with established Plan of Care towards PT goals.

## 2018-09-24 ENCOUNTER — CLINICAL SUPPORT (OUTPATIENT)
Dept: REHABILITATION | Facility: HOSPITAL | Age: 83
End: 2018-09-24
Payer: COMMERCIAL

## 2018-09-24 DIAGNOSIS — M25.669 DECREASED RANGE OF MOTION (ROM) OF KNEE: ICD-10-CM

## 2018-09-24 DIAGNOSIS — R29.898 DECREASED STRENGTH OF LOWER EXTREMITY: ICD-10-CM

## 2018-09-24 DIAGNOSIS — M25.561 CHRONIC PAIN OF RIGHT KNEE: ICD-10-CM

## 2018-09-24 DIAGNOSIS — G89.29 CHRONIC PAIN OF RIGHT KNEE: ICD-10-CM

## 2018-09-24 PROCEDURE — 97110 THERAPEUTIC EXERCISES: CPT | Mod: PN

## 2018-09-24 NOTE — PROGRESS NOTES
"[facility] Physical Therapy Daily Note    Name: Judy Thompson  Clinic Number: [id number]  Date of Treatment: 9/24/2018   Diagnosis:   Encounter Diagnoses   Name Primary?    Chronic pain of right knee     Decreased strength of lower extremity     Decreased range of motion (ROM) of knee      Visit number: 14  Start date: 8/6/18  POC End date: 10/1/18    Time in: 3:00 pm  Time Out: 4:05 pm  Total Treatment Time: 55 minutes  Total billable time: 55    Precautions: osteoporosis    Subjective:    Judy reports having mild increase of discomfort into B knees this weekend, improved today with minimal pain but R is worse than L. Patient reports their pain to be 1/10 on a 0-10 scale with 0 being no pain and 10 being the worst pain imaginable.    Objective    NP- Pt received manual therapy techniques to R knee including joint mobilization into patellofemoral med/lat and sup/inf glides and soft tissue mobilization to popliteal fossa and patellar tendon for a total of 2 minutes.     Judy received therapeutic exercises to develop strength, endurance and flexibility for 55 minutes including:   SAQ flat x 30  SAQ over towel roll x 20  SAQ over 6 inch red bolster x 30 2#   Sup bridge with RTB for hip iso 3x10  Supine clamshell RTB x30  Supine hip flexor stretch over EOT 3 x 30 seconds  LAQ 2 x 10 with 3" hold  TKE YTB 3 x 10 (cues to prevent posterior R hip rotation)  Sidestep x 5 laps along counter  Mini squats 2 x 10 at counter (cues for proper technique)  Step up onto 4 inch step with opposite hip flexion x 10 bilateral  Side step tap onto 6 inch step 2 x 10 Bilateral    Pt deferred to home: Pt received cold pack to R knee x 10 minutes for pain and inflammation.    Written Home Exercises Provided: none, pt to continue with current written HEP.  Pt demo good understanding of the education provided. Judy demonstrated good return demonstration of activities.     Assessment:   Pt continues with overall " "good tolerance to treatment without increased pain or soreness. She continues with report of discomfort with terminal R knee extension while performing forward step up on 4" step, relieves with rest. LE strength and stability is slowly improving as pt is able to perform increased challenges with exercises. Sidesteps and mini squats performed at counter this date to simulate home exercises.    Pt will continue to benefit from skilled PT intervention. Medical Necessity is demonstrated by:  Pain limits function of effected part for some activities, Unable to participate fully in daily activities, Requires skilled supervision to complete and progress HEP and Weakness.    Patient is making good progress towards established goals.    New/Revised goals: none  Short Term Goals:  4 weeks  1. Pt will present with increased R knee flexion by 5 degrees.  - met  2. Pt will present with decreased inflammation to R patellar tendon to allow for increased patellar mobility and decreased pain with standing for pain level of 6/10. - met  3. Pt will present with increased R knee ext to equal L for increased ease with heel strike during ambulation. - in progress  4. Pt will present with increased hip abd MMT by one half grade for increased stability during ambulation and decreased stress to R knee. - med   5. Pt will present with increased R knee MMT into flex and ext to 5/5 for increased stability with ambulation. - in progress  6. Pt will present with increased HS length on R by 5 degrees for decreased stress to R knee. - met     Long Term Goals: 6-8 weeks  1. Pt will present with increased R knee flexion by 5 degrees.    2. Pt will present with decreased inflammation to R patellar tendon to allow for increased patellar mobility and decreased pain with standing for pain level of 6/10.  3. Pt will present with increased R knee ext to equal L for increased ease with heel strike during ambulation.  4. Pt will present with increased hip " abd MMT by one half grade for increased stability during ambulation and decreased stress to R knee.   5. Pt will present with increased R knee MMT into flex and ext to 5/5 for increased stability with ambulation.   6. Pt will be independent with HEP and self management of symptoms.    Plan:  Continue with established Plan of Care towards PT goals.

## 2018-09-27 ENCOUNTER — CLINICAL SUPPORT (OUTPATIENT)
Dept: REHABILITATION | Facility: HOSPITAL | Age: 83
End: 2018-09-27
Payer: COMMERCIAL

## 2018-09-27 DIAGNOSIS — G89.29 CHRONIC PAIN OF RIGHT KNEE: ICD-10-CM

## 2018-09-27 DIAGNOSIS — R29.898 DECREASED STRENGTH OF LOWER EXTREMITY: ICD-10-CM

## 2018-09-27 DIAGNOSIS — M25.669 DECREASED RANGE OF MOTION (ROM) OF KNEE: ICD-10-CM

## 2018-09-27 DIAGNOSIS — M25.561 CHRONIC PAIN OF RIGHT KNEE: ICD-10-CM

## 2018-09-27 PROCEDURE — 97110 THERAPEUTIC EXERCISES: CPT | Mod: PN

## 2018-09-27 NOTE — PROGRESS NOTES
"[facility] Physical Therapy Daily Note    Name: Judy Thompson  Clinic Number: [id number]  Date of Treatment: 9/27/2018   Diagnosis:   Encounter Diagnoses   Name Primary?    Chronic pain of right knee     Decreased strength of lower extremity     Decreased range of motion (ROM) of knee      Visit number: 14  Start date: 8/6/18  POC End date: 10/1/18    Time in: 2:03 pm  Time Out: 2:55 pm  Total Treatment Time: 55 minutes  Total billable time: 30    Precautions: osteoporosis    Subjective:    Judy reports mild increase of pain into the knees today from running errands and being on her feet. Patient reports their pain to be 3/10 on a 0-10 scale with 0 being no pain and 10 being the worst pain imaginable.    Objective    NP- Pt received manual therapy techniques to R knee including joint mobilization into patellofemoral med/lat and sup/inf glides and soft tissue mobilization to popliteal fossa and patellar tendon for a total of 2 minutes.     Judy received therapeutic exercises to develop strength, endurance and flexibility for 55 minutes including:   SAQ flat x 30  SAQ over towel roll x 20  SAQ over 6 inch red bolster x 30 2#   Sup bridge with RTB for hip iso 3x10  Supine clamshell RTB x30  Supine hip flexor stretch over EOT 3 x 30 seconds  LAQ 2 x 10 with 3" hold    TKE YTB 3 x 10 (cues to prevent posterior R hip rotation)  Sidestep x 5 laps along counter  Mini squats 2 x 10 at counter (cues for proper technique)  Step up onto 4 inch step with opposite hip flexion x 10 bilateral  Side step tap onto 6 inch step 2 x 10 Bilateral    Pt deferred to home: Pt received cold pack to R knee x 10 minutes for pain and inflammation.    Written Home Exercises Provided: none, pt to continue with current written HEP.  Pt demo good understanding of the education provided. Judy demonstrated good return demonstration of activities.     Assessment:   Pt with good tolerance to exercises this date without " onset of pain. She required continued cues for proper technique of mini squats, forward step ups, and lateral heel taps. Making slow improvements of strength and would benefit from increased compliance to HEP.     Pt will continue to benefit from skilled PT intervention. Medical Necessity is demonstrated by:  Pain limits function of effected part for some activities, Unable to participate fully in daily activities, Requires skilled supervision to complete and progress HEP and Weakness.    Patient is making good progress towards established goals.    New/Revised goals: none  Short Term Goals:  4 weeks  1. Pt will present with increased R knee flexion by 5 degrees.  - met  2. Pt will present with decreased inflammation to R patellar tendon to allow for increased patellar mobility and decreased pain with standing for pain level of 6/10. - met  3. Pt will present with increased R knee ext to equal L for increased ease with heel strike during ambulation. - in progress  4. Pt will present with increased hip abd MMT by one half grade for increased stability during ambulation and decreased stress to R knee. - med   5. Pt will present with increased R knee MMT into flex and ext to 5/5 for increased stability with ambulation. - in progress  6. Pt will present with increased HS length on R by 5 degrees for decreased stress to R knee. - met     Long Term Goals: 6-8 weeks  1. Pt will present with increased R knee flexion by 5 degrees.    2. Pt will present with decreased inflammation to R patellar tendon to allow for increased patellar mobility and decreased pain with standing for pain level of 6/10.  3. Pt will present with increased R knee ext to equal L for increased ease with heel strike during ambulation.  4. Pt will present with increased hip abd MMT by one half grade for increased stability during ambulation and decreased stress to R knee.   5. Pt will present with increased R knee MMT into flex and ext to 5/5 for  increased stability with ambulation.   6. Pt will be independent with HEP and self management of symptoms.    Plan:  Continue with established Plan of Care towards PT goals.

## 2018-10-01 ENCOUNTER — CLINICAL SUPPORT (OUTPATIENT)
Dept: REHABILITATION | Facility: HOSPITAL | Age: 83
End: 2018-10-01
Payer: COMMERCIAL

## 2018-10-01 DIAGNOSIS — G89.29 CHRONIC PAIN OF RIGHT KNEE: ICD-10-CM

## 2018-10-01 DIAGNOSIS — R29.898 DECREASED STRENGTH OF LOWER EXTREMITY: ICD-10-CM

## 2018-10-01 DIAGNOSIS — M25.561 CHRONIC PAIN OF RIGHT KNEE: ICD-10-CM

## 2018-10-01 DIAGNOSIS — M25.669 DECREASED RANGE OF MOTION OF KNEE, UNSPECIFIED LATERALITY: ICD-10-CM

## 2018-10-01 PROCEDURE — 97110 THERAPEUTIC EXERCISES: CPT | Mod: PN

## 2018-10-01 NOTE — PROGRESS NOTES
"[facility] Physical Therapy Daily Note    Name: Judy Thompson  Clinic Number: [id number]  Date of Treatment: 10/1/2018   Diagnosis:   Encounter Diagnoses   Name Primary?    Chronic pain of right knee     Decreased strength of lower extremity     Decreased range of motion of knee, unspecified laterality      Visit number: 14  Start date: 8/6/18  POC End date: 10/12/18    Time in: 3:03 pm  Time Out: 3:55 pm  Total Treatment Time: 50 minutes  Total billable time: 50    Precautions: osteoporosis    Subjective:    Judy reports overall 40-50% improvement of symptoms since beginning therapy. She reports her R knee is a little more swollen today, not sure why, and that she has good and bad days. Patient reports their pain to be 4/10 on a 0-10 scale with 0 being no pain and 10 being the worst pain imaginable.    Objective    Knee Left Right   Flexion 143 141   Extention 0 0   *Tested with patient supine     Hip MMT  Flexion: L 4/5, R 4-/5  Abd: 4+/5 B  Ext: 4/5 B     Knee Strength Left Right   Flexion 5/5 55   Extension 5/5 5/5       Pt received manual therapy techniques to R knee including joint mobilization into patellofemoral med/lat and sup/inf glides and soft tissue mobilization to popliteal fossa and patellar tendon for a total of 5 minutes.     Judy received therapeutic exercises to develop strength, endurance and flexibility for 55 minutes including:   Quad set towel roll x 30  Quad set flat x 20  Side lying hip abd 2 x 10 B  Prone quad stretch 3 x 30 seconds B  Standing hip extension  TKE YTB 3 x 10 (cues to prevent posterior R hip rotation)  Sidestep x 5 laps along counter    Pt deferred to home: Pt received cold pack to R knee x 10 minutes for pain and inflammation.    Written Home Exercises Provided: supine hip flexor stretch, standing hip ext. View at "[email or web address]" using code: [email or web address]  Pt demo good understanding of the education provided. Judy demonstrated good return " demonstration of activities.     Assessment:   Pt has made good progress with improved strength to R knee, see objective measures. She presents with improved hip strength and R knee AROM. She demonstrates overall improved gait, better some days than others.     Pt will continue to benefit from skilled PT intervention. Medical Necessity is demonstrated by:  Pain limits function of effected part for some activities, Unable to participate fully in daily activities, Requires skilled supervision to complete and progress HEP and Weakness.    Patient is making good progress towards established goals.    New/Revised goals: none  Short Term Goals:  4 weeks  1. Pt will present with increased R knee flexion by 5 degrees.  - met  2. Pt will present with decreased inflammation to R patellar tendon to allow for increased patellar mobility and decreased pain with standing for pain level of 6/10. - met  3. Pt will present with increased R knee ext to equal L for increased ease with heel strike during ambulation. - in progress  4. Pt will present with increased hip abd MMT by one half grade for increased stability during ambulation and decreased stress to R knee. - med   5. Pt will present with increased R knee MMT into flex and ext to 5/5 for increased stability with ambulation. - in progress  6. Pt will present with increased HS length on R by 5 degrees for decreased stress to R knee. - met     Long Term Goals: 6-8 weeks  1. Pt will present with increased R knee flexion by 10 degrees.    2. Pt will present with decreased inflammation to R patellar tendon to allow for increased patellar mobility and decreased pain with standing for pain level of 4/10.  3. Pt will present with increased hip abd MMT by one full grade for increased stability during ambulation and decreased stress to R knee.   4. Pt will present with increased R knee MMT into flex and ext to 5/5 for increased stability with ambulation. -MET   5. Pt will be independent  with HEP and self management of symptoms.    Plan:  Continue with established Plan of Care towards PT goals.     PT/PTA met face to face to discuss patient's treatment plan and progress towards established goals. Continue for two additional weeks per initial POC Patient will be seen by physical therapist every sixth visit and minimally once per month.

## 2018-10-04 ENCOUNTER — CLINICAL SUPPORT (OUTPATIENT)
Dept: REHABILITATION | Facility: HOSPITAL | Age: 83
End: 2018-10-04
Payer: COMMERCIAL

## 2018-10-04 ENCOUNTER — OFFICE VISIT (OUTPATIENT)
Dept: PODIATRY | Facility: CLINIC | Age: 83
End: 2018-10-04

## 2018-10-04 VITALS — WEIGHT: 135 LBS | BODY MASS INDEX: 23.92 KG/M2 | HEIGHT: 63 IN

## 2018-10-04 DIAGNOSIS — R29.898 DECREASED STRENGTH OF LOWER EXTREMITY: ICD-10-CM

## 2018-10-04 DIAGNOSIS — B35.1 ONYCHOMYCOSIS DUE TO DERMATOPHYTE: Primary | ICD-10-CM

## 2018-10-04 DIAGNOSIS — M25.669 DECREASED RANGE OF MOTION OF KNEE, UNSPECIFIED LATERALITY: ICD-10-CM

## 2018-10-04 DIAGNOSIS — M25.561 CHRONIC PAIN OF RIGHT KNEE: ICD-10-CM

## 2018-10-04 DIAGNOSIS — L60.2 ONYCHAUXIS: ICD-10-CM

## 2018-10-04 DIAGNOSIS — G89.29 CHRONIC PAIN OF RIGHT KNEE: ICD-10-CM

## 2018-10-04 PROCEDURE — 99999 PR PBB SHADOW E&M-EST. PATIENT-LVL III: CPT | Mod: PBBFAC,,, | Performed by: PODIATRIST

## 2018-10-04 PROCEDURE — 99024 POSTOP FOLLOW-UP VISIT: CPT | Mod: DBM,S$GLB,, | Performed by: PODIATRIST

## 2018-10-04 PROCEDURE — 97110 THERAPEUTIC EXERCISES: CPT | Mod: PN

## 2018-10-04 PROCEDURE — 17999 UNLISTD PX SKN MUC MEMB SUBQ: CPT | Mod: S$GLB,,, | Performed by: PODIATRIST

## 2018-10-04 NOTE — PROGRESS NOTES
"[facility] Physical Therapy Daily Note    Name: Judy Thompson  Clinic Number: [id number]  Date of Treatment: 10/4/2018   Diagnosis:   Encounter Diagnoses   Name Primary?    Chronic pain of right knee     Decreased strength of lower extremity     Decreased range of motion of knee, unspecified laterality      Visit number: 14  Start date: 8/6/18  POC End date: 10/12/18    Time in: 3:03 pm  Time Out: 3:55 pm  Total Treatment Time: 40 minutes  Total billable time: 40    Precautions: osteoporosis    Subjective:    Judy reports overall 40-50% improvement of symptoms since beginning therapy. She reports her R knee is a little more swollen today, not sure why, and that she has good and bad days. Patient reports their pain to be 4/10 on a 0-10 scale with 0 being no pain and 10 being the worst pain imaginable.    Objective    Pt received manual therapy techniques to R knee including joint mobilization into patellofemoral med/lat and sup/inf glides and soft tissue mobilization to popliteal fossa and patellar tendon for a total of 5 minutes.     Judy received therapeutic exercises to develop strength, endurance and flexibility for 55 minutes including:   Supine hipf flexior stretch  Quad set towel roll x 30  Quad set flat x 20  Side lying hip abd 2 x 10 B  Clamshells x 20 B  Forward step up x 10 B  Lateral step up x 10 B  Standing heel raise x 20  Standing hip extension 2 x 10 B  Sidestep x 5 laps along counter  Mini squat in parallel bars x 10    Pt received cold pack to R knee x 10 minutes for pain and inflammation.    Written Home Exercises Provided: supine hip flexor stretch, standing hip ext. View at "[email or web address]" using code: [email or web address]  Pt demo good understanding of the education provided. Judy demonstrated good return demonstration of activities.     Assessment:   Pt presents with decreased R knee crepitus today. She performed all standing exercises without reports of increased pain " to her R knee. She required cues for proper technique with step ups forward and lateral.     Pt will continue to benefit from skilled PT intervention. Medical Necessity is demonstrated by:  Pain limits function of effected part for some activities, Unable to participate fully in daily activities, Requires skilled supervision to complete and progress HEP and Weakness.    Patient is making good progress towards established goals.    New/Revised goals: none  Short Term Goals:  4 weeks  1. Pt will present with increased R knee flexion by 5 degrees.  - met  2. Pt will present with decreased inflammation to R patellar tendon to allow for increased patellar mobility and decreased pain with standing for pain level of 6/10. - met  3. Pt will present with increased R knee ext to equal L for increased ease with heel strike during ambulation. - in progress  4. Pt will present with increased hip abd MMT by one half grade for increased stability during ambulation and decreased stress to R knee. - med   5. Pt will present with increased R knee MMT into flex and ext to 5/5 for increased stability with ambulation. - in progress  6. Pt will present with increased HS length on R by 5 degrees for decreased stress to R knee. - met     Long Term Goals: 6-8 weeks  1. Pt will present with increased R knee flexion by 10 degrees.    2. Pt will present with decreased inflammation to R patellar tendon to allow for increased patellar mobility and decreased pain with standing for pain level of 4/10.  3. Pt will present with increased hip abd MMT by one full grade for increased stability during ambulation and decreased stress to R knee.   4. Pt will present with increased R knee MMT into flex and ext to 5/5 for increased stability with ambulation. -MET   5. Pt will be independent with HEP and self management of symptoms.    Plan:  Anticipate DC to HEP next visit.

## 2018-10-08 ENCOUNTER — CLINICAL SUPPORT (OUTPATIENT)
Dept: REHABILITATION | Facility: HOSPITAL | Age: 83
End: 2018-10-08
Payer: COMMERCIAL

## 2018-10-08 DIAGNOSIS — G89.29 CHRONIC PAIN OF RIGHT KNEE: ICD-10-CM

## 2018-10-08 DIAGNOSIS — M25.561 CHRONIC PAIN OF RIGHT KNEE: ICD-10-CM

## 2018-10-08 DIAGNOSIS — M25.669 DECREASED RANGE OF MOTION OF KNEE, UNSPECIFIED LATERALITY: ICD-10-CM

## 2018-10-08 DIAGNOSIS — R29.898 DECREASED STRENGTH OF LOWER EXTREMITY: ICD-10-CM

## 2018-10-08 PROCEDURE — 97110 THERAPEUTIC EXERCISES: CPT | Mod: PN

## 2018-10-08 NOTE — PATIENT INSTRUCTIONS
HIP / KNEE: Extension - Standing        Squeeze glutes. Raise and lift leg backward. Keep knee straight or slightly bent.  __10_ reps per set, _2__ sets per day, _4__ days per week Hold onto a support.    Copyright © [employer]. All rights reserved.   ABDUCTION: Standing (Active)        Stand, feet flat. Lift right leg out to side.   Complete __2_ sets of _10__ repetitions. Perform _4__ sessions per week.     [email or web address]     Copyright © [employer]. All rights reserved.

## 2018-10-08 NOTE — PROGRESS NOTES
[facility] Physical Therapy Daily Note    Name: Judy Thompson  Clinic Number: [id number]  Date of Treatment: 10/8/2018   Diagnosis:   Encounter Diagnoses   Name Primary?    Chronic pain of right knee     Decreased strength of lower extremity     Decreased range of motion of knee, unspecified laterality      Visit number: 14  Start date: 8/6/18  POC End date: 10/12/18    Time in: 3:03 pm  Time Out: 3:55 pm  Total Treatment Time: 40 minutes  Total billable time: 30    Precautions: osteoporosis    Subjective:    Judy reports her knee has been bothering her a little more lately. She feels she is not ready for DC today due to increased symptoms. She feels she would benefit from continued therapy.   Patient reports their pain to be 4/10 on a 0-10 scale with 0 being no pain and 10 being the worst pain imaginable. She performs HEP here and there, not very consistent and not all of them.     Objective    R knee flex 141  R knee ext -2    R knee flexion ext 5/5    R hip abd 4/5 (decreased since last measurements)    Pt received manual therapy techniques to R knee including joint mobilization into patellofemoral med/lat and sup/inf glides and soft tissue mobilization to popliteal fossa and patellar tendon for a total of 5 minutes.     Judy received therapeutic exercises to develop strength, endurance and flexibility for 55 minutes including:   Supine hipf flexior stretch  Quad set towel roll x 30  Quad set flat x 20  Side lying hip abd 2 x 10 B  Clamshells x 20 B  Forward step up x 10 B  Lateral step up x 10 B  Standing heel raise x 20  Standing hip extension 2 x 10 B  Standing hip abd 2 x 10 B    Pt received cold pack to R knee x 10 minutes for pain and inflammation.    Written Home Exercises Provided:standing hip ext and abd. Discussed going to one time per week prior to discharge to ensure patient is gaining compliance with HEP.   Pt demo good understanding of the education provided. Judy  demonstrated good return demonstration of activities.     Assessment:   Pt presents more normalized R knee flexion. R hip weakness affecting ambulation. Will benefit from one more month of therapy to address limitations and increased symptoms since last visit.      Pt will continue to benefit from skilled PT intervention. Medical Necessity is demonstrated by:  Pain limits function of effected part for some activities, Unable to participate fully in daily activities, Requires skilled supervision to complete and progress HEP and Weakness.    Patient is making good progress towards established goals.    New/Revised goals: none  Short Term Goals:  4 weeks  1. Pt will present with increased R knee flexion by 5 degrees.  - met  2. Pt will present with decreased inflammation to R patellar tendon to allow for increased patellar mobility and decreased pain with standing for pain level of 6/10. - met  3. Pt will present with increased R knee ext to equal L for increased ease with heel strike during ambulation. - in progress  4. Pt will present with increased hip abd MMT by one half grade for increased stability during ambulation and decreased stress to R knee. - med   5. Pt will present with increased R knee MMT into flex and ext to 5/5 for increased stability with ambulation. - in progress  6. Pt will present with increased HS length on R by 5 degrees for decreased stress to R knee. - met     Long Term Goals: 6-8 weeks  1. Pt will present with increased R knee flexion by 10 degrees.    2. Pt will present with decreased inflammation to R patellar tendon to allow for increased patellar mobility and decreased pain with standing for pain level of 4/10.  3. Pt will present with increased hip abd MMT by one full grade for increased stability during ambulation and decreased stress to R knee.   4. Pt will present with increased R knee MMT into flex and ext to 5/5 for increased stability with ambulation. -MET   5. Pt will be  independent with HEP and self management of symptoms.    Plan:  Due to increased symptoms since last visit, recommend 4 additional weeks of therapy for knee pain with emphasis on HEP.

## 2018-10-09 DIAGNOSIS — I10 ESSENTIAL HYPERTENSION: ICD-10-CM

## 2018-10-09 RX ORDER — AMLODIPINE BESYLATE 2.5 MG/1
TABLET ORAL
Qty: 90 TABLET | Refills: 0 | Status: SHIPPED | OUTPATIENT
Start: 2018-10-09 | End: 2018-10-11 | Stop reason: SDUPTHER

## 2018-10-11 ENCOUNTER — OFFICE VISIT (OUTPATIENT)
Dept: FAMILY MEDICINE | Facility: CLINIC | Age: 83
End: 2018-10-11
Payer: COMMERCIAL

## 2018-10-11 VITALS
SYSTOLIC BLOOD PRESSURE: 132 MMHG | BODY MASS INDEX: 23.94 KG/M2 | DIASTOLIC BLOOD PRESSURE: 64 MMHG | WEIGHT: 135.13 LBS | OXYGEN SATURATION: 95 % | HEART RATE: 72 BPM | TEMPERATURE: 99 F | HEIGHT: 63 IN

## 2018-10-11 DIAGNOSIS — F41.9 ANXIETY: ICD-10-CM

## 2018-10-11 DIAGNOSIS — I10 ESSENTIAL HYPERTENSION: ICD-10-CM

## 2018-10-11 PROCEDURE — 3078F DIAST BP <80 MM HG: CPT | Mod: CPTII,S$GLB,, | Performed by: FAMILY MEDICINE

## 2018-10-11 PROCEDURE — 1101F PT FALLS ASSESS-DOCD LE1/YR: CPT | Mod: CPTII,S$GLB,, | Performed by: FAMILY MEDICINE

## 2018-10-11 PROCEDURE — 90662 IIV NO PRSV INCREASED AG IM: CPT | Mod: S$GLB,,, | Performed by: FAMILY MEDICINE

## 2018-10-11 PROCEDURE — 99214 OFFICE O/P EST MOD 30 MIN: CPT | Mod: 25,S$GLB,, | Performed by: FAMILY MEDICINE

## 2018-10-11 PROCEDURE — 3075F SYST BP GE 130 - 139MM HG: CPT | Mod: CPTII,S$GLB,, | Performed by: FAMILY MEDICINE

## 2018-10-11 PROCEDURE — 90471 IMMUNIZATION ADMIN: CPT | Mod: S$GLB,,, | Performed by: FAMILY MEDICINE

## 2018-10-11 PROCEDURE — 99999 PR PBB SHADOW E&M-EST. PATIENT-LVL IV: CPT | Mod: PBBFAC,,, | Performed by: FAMILY MEDICINE

## 2018-10-11 RX ORDER — DIAZEPAM 5 MG/1
5 TABLET ORAL DAILY
Qty: 90 TABLET | Refills: 1 | Status: SHIPPED | OUTPATIENT
Start: 2018-10-11 | End: 2019-06-04 | Stop reason: SDUPTHER

## 2018-10-11 RX ORDER — AMLODIPINE BESYLATE 2.5 MG/1
2.5 TABLET ORAL DAILY
Qty: 90 TABLET | Refills: 3 | Status: SHIPPED | OUTPATIENT
Start: 2018-10-11 | End: 2019-11-10 | Stop reason: SDUPTHER

## 2018-10-11 NOTE — PROGRESS NOTES
"Subjective:       Patient ID: Judy Thompson is a 85 y.o. female.    Chief Complaint: Follow-up (3mth follow up )    Follow-up hypertension.  Her blood pressure is better today.  She is receiving Prolia for osteoporosis.  Her spine T-score was +3.9 and her left hip was-1.8.  She is having some itching in her shoulders where she applies Pensaide for shoulder pain. She is getting physical therapy for her knee arthritis with some benefit.  She has anxiety but seems to be coping fairly well.  She needs to have a fresh prescription for Valium.      Review of Systems   Constitutional: Negative for fever and unexpected weight change.   Respiratory: Negative for cough and shortness of breath.    Cardiovascular: Negative for chest pain and leg swelling.   Musculoskeletal: Positive for arthralgias.       Objective:     Blood pressure 132/64, pulse 72, temperature 98.7 °F (37.1 °C), temperature source Oral, height 5' 3" (1.6 m), weight 61.3 kg (135 lb 2.3 oz), SpO2 95 %.      Physical Exam   Constitutional: She appears well-developed and well-nourished. No distress.   Cardiovascular: Normal rate and regular rhythm.   Pulmonary/Chest: Effort normal and breath sounds normal. No respiratory distress.   Musculoskeletal:   She has a right Dupuytren's contracture on the ulnar side which is mild.   Neurological: She is alert.   Skin:   She has some evidence of excoriations on her trapezius area.       Assessment:       1. Essential hypertension    2. Anxiety        Plan:       Stop topical NSAID for now.  Let me know if she needs further help with her shoulder pain.   I refilled Valium.  Flu shot today.  "

## 2018-10-15 ENCOUNTER — CLINICAL SUPPORT (OUTPATIENT)
Dept: REHABILITATION | Facility: HOSPITAL | Age: 83
End: 2018-10-15
Payer: COMMERCIAL

## 2018-10-15 DIAGNOSIS — M25.561 CHRONIC PAIN OF RIGHT KNEE: ICD-10-CM

## 2018-10-15 DIAGNOSIS — M25.669 DECREASED RANGE OF MOTION OF KNEE, UNSPECIFIED LATERALITY: ICD-10-CM

## 2018-10-15 DIAGNOSIS — R29.898 DECREASED STRENGTH OF LOWER EXTREMITY: ICD-10-CM

## 2018-10-15 DIAGNOSIS — G89.29 CHRONIC PAIN OF RIGHT KNEE: ICD-10-CM

## 2018-10-15 PROCEDURE — 97110 THERAPEUTIC EXERCISES: CPT | Mod: PN

## 2018-10-15 NOTE — PROGRESS NOTES
[facility] Outpatient Physical Therapy Daily Note    Name: Judy Thompson  Clinic Number: [id number]  Date of Treatment: 10/15/2018   Diagnosis:   Encounter Diagnoses   Name Primary?    Chronic pain of right knee     Decreased strength of lower extremity     Decreased range of motion of knee, unspecified laterality      Visit number: 15  Start date: 8/6/18  POC End date: 10/12/18    Time in: 3:10 pm (patient arrived late)  Time Out: 4:00 pm  Total Treatment Time: 40 minutes  Total billable time: 40    Precautions: osteoporosis    Subjective:    Judy reports her knee has been off and on with symptoms, some good days and some bad. Today is a little worse.  Patient reports their pain to be 4/10 on a 0-10 scale with 0 being no pain and 10 being the worst pain imaginable. States she has been trying to be better with doing her home exercises.    Objective    Pt received manual therapy techniques to R knee including joint mobilization into patellofemoral med/lat and sup/inf glides and soft tissue mobilization to popliteal fossa and patellar tendon for a total of 5 minutes.     Judy received therapeutic exercises to develop strength, endurance and flexibility for 40 minutes including:   Supine hip flexor stretch  Quad set towel roll x 30  Quad set flat x 20  Side lying hip abd 2 x 10 B  Clamshells x 20 B  Forward step up x 10 B  Lateral step up x 10 B  Standing heel raise x 20  Standing hip extension 2 x 10 B  Standing hip abd 2 x 10 B    Pt received cold pack to R knee x 10 minutes for pain and inflammation.    Written Home Exercises Provided:standing hip ext and abd. Discussed going to one time per week prior to discharge to ensure patient is gaining compliance with HEP.   Pt demo good understanding of the education provided. Judy demonstrated good return demonstration of activities.     Assessment:   Patient tolerated treatment well this date without onset of pain or soreness. Mild improvement of  patellar mobility upon arrival. Pt continues with R hip weakness affecting ambulation and functional stability. Cues with forward and lateral step ups for glute activation.      Pt will continue to benefit from skilled PT intervention. Medical Necessity is demonstrated by:  Pain limits function of effected part for some activities, Unable to participate fully in daily activities, Requires skilled supervision to complete and progress HEP and Weakness.    Patient is making good progress towards established goals.    New/Revised goals: none  Short Term Goals:  4 weeks  1. Pt will present with increased R knee flexion by 5 degrees.  - met  2. Pt will present with decreased inflammation to R patellar tendon to allow for increased patellar mobility and decreased pain with standing for pain level of 6/10. - met  3. Pt will present with increased R knee ext to equal L for increased ease with heel strike during ambulation. - in progress  4. Pt will present with increased hip abd MMT by one half grade for increased stability during ambulation and decreased stress to R knee. - med   5. Pt will present with increased R knee MMT into flex and ext to 5/5 for increased stability with ambulation. - in progress  6. Pt will present with increased HS length on R by 5 degrees for decreased stress to R knee. - met     Long Term Goals: 6-8 weeks  1. Pt will present with increased R knee flexion by 10 degrees.    2. Pt will present with decreased inflammation to R patellar tendon to allow for increased patellar mobility and decreased pain with standing for pain level of 4/10.  3. Pt will present with increased hip abd MMT by one full grade for increased stability during ambulation and decreased stress to R knee.   4. Pt will present with increased R knee MMT into flex and ext to 5/5 for increased stability with ambulation. -MET   5. Pt will be independent with HEP and self management of symptoms.    Plan:  Due to increased symptoms since  last visit, recommend 4 additional weeks of therapy for knee pain with emphasis on HEP.

## 2018-10-22 ENCOUNTER — CLINICAL SUPPORT (OUTPATIENT)
Dept: REHABILITATION | Facility: HOSPITAL | Age: 83
End: 2018-10-22
Payer: COMMERCIAL

## 2018-10-22 ENCOUNTER — TELEPHONE (OUTPATIENT)
Dept: FAMILY MEDICINE | Facility: CLINIC | Age: 83
End: 2018-10-22

## 2018-10-22 DIAGNOSIS — G89.29 CHRONIC PAIN OF RIGHT KNEE: ICD-10-CM

## 2018-10-22 DIAGNOSIS — M25.669 DECREASED RANGE OF MOTION OF KNEE, UNSPECIFIED LATERALITY: ICD-10-CM

## 2018-10-22 DIAGNOSIS — M25.561 CHRONIC PAIN OF RIGHT KNEE: ICD-10-CM

## 2018-10-22 DIAGNOSIS — R29.898 DECREASED STRENGTH OF LOWER EXTREMITY: ICD-10-CM

## 2018-10-22 DIAGNOSIS — L30.8 OTHER ECZEMA: ICD-10-CM

## 2018-10-22 PROCEDURE — 97110 THERAPEUTIC EXERCISES: CPT | Mod: PN

## 2018-10-22 RX ORDER — TRIAMCINOLONE ACETONIDE 1 MG/G
CREAM TOPICAL 2 TIMES DAILY
Qty: 30 G | Refills: 5 | Status: SHIPPED | OUTPATIENT
Start: 2018-10-22 | End: 2019-02-04

## 2018-10-22 RX ORDER — TRIAMCINOLONE ACETONIDE 1 MG/G
CREAM TOPICAL
Qty: 454 G | Refills: 3 | OUTPATIENT
Start: 2018-10-22

## 2018-10-22 NOTE — PROGRESS NOTES
[facility] Outpatient Physical Therapy Daily Note    Name: Judy Thompson  Clinic Number: [id number]  Date of Treatment: 10/22/2018   Diagnosis:   Encounter Diagnoses   Name Primary?    Chronic pain of right knee     Decreased strength of lower extremity     Decreased range of motion of knee, unspecified laterality      Visit number: 16  Start date: 8/6/18  POC End date: 11/5/18    Time in: 3:03 pm   Time Out: 3:53 pm  Total Treatment Time: 45 minutes  Total billable time: 45    Precautions: osteoporosis    Subjective:    Judy reports he knee isn't's feeling too great this afternoon due to walking more yesterday and the colder weather. Patient reports their pain to be 4/10 on a 0-10 scale with 0 being no pain and 10 being the worst pain imaginable. She reports performing HEP during the week more consistently and likes the standing ones, but did not perform the exercises yesterday.     Objective    Pt received manual therapy techniques to R knee including joint mobilization into patellofemoral med/lat and sup/inf glides and soft tissue mobilization to popliteal fossa and patellar tendon for a total of 2 minutes.     Judy received therapeutic exercises to develop strength, endurance and flexibility for 40 minutes including:   Recumbent bike x 5 minutes for ROM and strength x 5 minutes  Side stepping on runway x 2 laps (approx 60 feet each lap)  Supine hip flexor stretch off EOT 3 x 30 seconds  Quad set flat x 20  Quad set over roll x 30  R knee ext hang over red bolster x 5 minutes with 3# weight  Side lying hip abd 1 x 20 B  Clamshells x 20 B Yellow loop  Alternating static marching with B HHA on hand rails x 20 B  Forward step up x 10 B  Lateral step up x 10 B  Exxentric standing heel raise x 15  Standing hip extension 1 x 10 B  Standing hip abd 1 x 10 B    Pt received cold pack to R knee x 5 minutes for pain and inflammation.    Written Home Exercises Provided: none today. Reviewed technique  with standing exercises.   Pt demo good understanding of the education provided. Judy demonstrated good return demonstration of activities.     Assessment:   Patient presents with improved R knee patellar mobility today, with decreased crepitus with quad sets. She performed exercises with minimal cuing for technique; required reminder for proper technique with clamshells to prevent hip IR with movement.     Pt will continue to benefit from skilled PT intervention. Medical Necessity is demonstrated by:  Pain limits function of effected part for some activities, Unable to participate fully in daily activities, Requires skilled supervision to complete and progress HEP and Weakness.    Patient is making good progress towards established goals.    New/Revised goals: none  Short Term Goals:  4 weeks  1. Pt will present with increased R knee flexion by 5 degrees.  - met  2. Pt will present with decreased inflammation to R patellar tendon to allow for increased patellar mobility and decreased pain with standing for pain level of 6/10. - met  3. Pt will present with increased R knee ext to equal L for increased ease with heel strike during ambulation. - in progress  4. Pt will present with increased hip abd MMT by one half grade for increased stability during ambulation and decreased stress to R knee. - med   5. Pt will present with increased R knee MMT into flex and ext to 5/5 for increased stability with ambulation. - in progress  6. Pt will present with increased HS length on R by 5 degrees for decreased stress to R knee. - met     Long Term Goals: 6-8 weeks  1. Pt will present with increased R knee flexion by 10 degrees.    2. Pt will present with decreased inflammation to R patellar tendon to allow for increased patellar mobility and decreased pain with standing for pain level of 4/10.  3. Pt will present with increased hip abd MMT by one full grade for increased stability during ambulation and decreased stress to R  knee.   4. Pt will present with increased R knee MMT into flex and ext to 5/5 for increased stability with ambulation. -MET   5. Pt will be independent with HEP and self management of symptoms.    Plan:  Due to increased symptoms since last visit, recommend 4 additional weeks of therapy for knee pain with emphasis on HEP.

## 2018-10-22 NOTE — TELEPHONE ENCOUNTER
Pt states at her appt she showed a rash to Dr. Salinas on her back and shoulders and he said he would send in a cream but no cream is available at pharmacy for pick-up The rash is itching, red and splotchy.

## 2018-10-29 ENCOUNTER — CLINICAL SUPPORT (OUTPATIENT)
Dept: REHABILITATION | Facility: HOSPITAL | Age: 83
End: 2018-10-29
Payer: COMMERCIAL

## 2018-10-29 DIAGNOSIS — R29.898 DECREASED STRENGTH OF LOWER EXTREMITY: ICD-10-CM

## 2018-10-29 DIAGNOSIS — G89.29 CHRONIC PAIN OF RIGHT KNEE: ICD-10-CM

## 2018-10-29 DIAGNOSIS — M25.561 CHRONIC PAIN OF RIGHT KNEE: ICD-10-CM

## 2018-10-29 DIAGNOSIS — M25.669 DECREASED RANGE OF MOTION OF KNEE, UNSPECIFIED LATERALITY: ICD-10-CM

## 2018-10-29 PROCEDURE — 97110 THERAPEUTIC EXERCISES: CPT | Mod: PN

## 2018-10-29 NOTE — PROGRESS NOTES
[facility] Outpatient Physical Therapy Daily Note    Name: Judy Thompson  Clinic Number: [id number]  Date of Treatment: 10/29/2018   Diagnosis:   Encounter Diagnoses   Name Primary?    Chronic pain of right knee     Decreased strength of lower extremity     Decreased range of motion of knee, unspecified laterality      Visit number: 16  Start date: 8/6/18  POC End date: 11/5/18    Time in: 3:03 pm   Time Out: 3:53 pm  Total Treatment Time: 45 minutes  Total billable time: 45    Precautions: osteoporosis    Subjective:    Judy reports her R knee is feeling so so today. She reports performing new HEP more consistently. She did more walking over the weekend and understands that she will need to perform HEP regularly and ice after using her knee more to decrease symptoms. Patient reports their pain to be 3/10 on a 0-10 scale with 0 being no pain and 10 being the worst pain imaginable.     Objective    Pt received manual therapy techniques to R knee including joint mobilization into patellofemoral med/lat and sup/inf glides and soft tissue mobilization to popliteal fossa and patellar tendon for a total of 2 minutes.     Judy received therapeutic exercises to develop strength, endurance and flexibility for 43 minutes including:   Quad set flat x 20  Recumbent bike x 7 minutes for ROM and strength level 1  Side stepping on runway x 2 laps (approx 60 feet each lap) with Yellow loop - hand hold assist  Standing hip extension 1 x 10 B with 1# ankle weight  Standing hip abd 1 x 10 B  With 1# ankle weight  Supine hip flexor stretch off EOT 3 x 30 seconds  Alternating static marching with B HHA on hand rails x 20 B with 1# weight   Forward step up x 10 B 1#  Lateral step up x 10 B  Eccentric standing heel raise x 20    NP:  R knee ext hang over red bolster x 5 minutes with 3# weight  Side lying hip abd 1 x 20 B  Clamshells x 20 B Yellow loop    Pt deferred cold pack to R knee x 5 minutes for pain and  inflammation.    Written Home Exercises Provided: none today.  Pt demo good understanding of the education provided. Judy demonstrated good return demonstration of activities.     Assessment:   Patient performed all exercises with resistance without reports of increased pain to R knee. She required minimal cues for performance of technique with exercises. Good quad contraction with decreasing crepitus.     Pt will continue to benefit from skilled PT intervention. Medical Necessity is demonstrated by:  Pain limits function of effected part for some activities, Unable to participate fully in daily activities, Requires skilled supervision to complete and progress HEP and Weakness.    Patient is making good progress towards established goals.    New/Revised goals: none  Short Term Goals:  4 weeks  1. Pt will present with increased R knee flexion by 5 degrees.  - met  2. Pt will present with decreased inflammation to R patellar tendon to allow for increased patellar mobility and decreased pain with standing for pain level of 6/10. - met  3. Pt will present with increased R knee ext to equal L for increased ease with heel strike during ambulation. - in progress  4. Pt will present with increased hip abd MMT by one half grade for increased stability during ambulation and decreased stress to R knee. - med   5. Pt will present with increased R knee MMT into flex and ext to 5/5 for increased stability with ambulation. - in progress  6. Pt will present with increased HS length on R by 5 degrees for decreased stress to R knee. - met     Long Term Goals: 6-8 weeks  1. Pt will present with increased R knee flexion by 10 degrees.    2. Pt will present with decreased inflammation to R patellar tendon to allow for increased patellar mobility and decreased pain with standing for pain level of 4/10.  3. Pt will present with increased hip abd MMT by one full grade for increased stability during ambulation and decreased stress to R  knee.   4. Pt will present with increased R knee MMT into flex and ext to 5/5 for increased stability with ambulation. -MET   5. Pt will be independent with HEP and self management of symptoms.    Plan:  Due to increased symptoms since last visit, recommend 4 additional weeks of therapy for knee pain with emphasis on HEP. Anticipate DC next visit.

## 2018-11-05 ENCOUNTER — CLINICAL SUPPORT (OUTPATIENT)
Dept: REHABILITATION | Facility: HOSPITAL | Age: 83
End: 2018-11-05
Payer: COMMERCIAL

## 2018-11-05 DIAGNOSIS — M25.561 CHRONIC PAIN OF RIGHT KNEE: ICD-10-CM

## 2018-11-05 DIAGNOSIS — G89.29 CHRONIC PAIN OF RIGHT KNEE: ICD-10-CM

## 2018-11-05 DIAGNOSIS — M25.669 DECREASED RANGE OF MOTION OF KNEE, UNSPECIFIED LATERALITY: ICD-10-CM

## 2018-11-05 DIAGNOSIS — R29.898 DECREASED STRENGTH OF LOWER EXTREMITY: ICD-10-CM

## 2018-11-05 PROCEDURE — G8979 MOBILITY GOAL STATUS: HCPCS | Mod: CJ,PN

## 2018-11-05 PROCEDURE — 97110 THERAPEUTIC EXERCISES: CPT | Mod: PN

## 2018-11-05 PROCEDURE — G8980 MOBILITY D/C STATUS: HCPCS | Mod: CK,PN

## 2018-11-05 NOTE — PROGRESS NOTES
[facility] Outpatient Physical Therapy Daily Note    Name: Judy Thompson  Clinic Number: [id number]  Date of Treatment: 11/5/2018   Diagnosis:   Encounter Diagnoses   Name Primary?    Chronic pain of right knee     Decreased strength of lower extremity     Decreased range of motion of knee, unspecified laterality      Visit number: 16  Start date: 8/6/18  POC End date: 11/5/18    Time in: 3:00 pm   Time Out: 3:45 pm  Total Treatment Time: 40 minutes  Total billable time: 30    Precautions: osteoporosis    Subjective:    Judy reports her R knee is feeling ok today, but when the weather was cold and muggy she felt both knees aching. She continues with good and bad days with her R knee and under stands that performing exercises and icing will help to decrease symptoms. She is agreeable to DC to HEP today. Patient reports their pain to be 4/10 on a 0-10 scale with 0 being no pain and 10 being the worst pain imaginable.     Objective      Knee Left Right   Flexion 143 144   Extention 0 0   *Tested with patient supine     Hip MMT  Flexion: L 4+/5, R 4+/5  Abd: 4+/5 B  Ext: 4+/5 B     Knee Strength Left Right   Flexion 5/5 55   Extension 5/5 5/5     Judy received therapeutic exercises to develop strength, endurance and flexibility for 43 minutes including:   Recumbent bike x 5 minutes for ROM and strength level 1  Side stepping on runway x 2 laps (approx 60 feet each lap) with Yellow loop - hand hold assist  Standing hip extension 1 x 10 B with 1# ankle weight  Standing hip abd 1 x 10 B  With 1# ankle weight  Alternating static marching with B HHA on hand rails x 20 B with 1# weight   Forward step up x 10 B 1#  Lateral step up x 10 B    Pt deferred cold pack to R knee today    Written Home Exercises Provided: Discussed HEP, pt reports she understands HEP.  Pt demo good understanding of the education provided. Judy demonstrated good return demonstration of activities.     CMS  Impairment/Limitation/Restriction for FOTO Knee Survey  Status Limitation G-Code CMS Severity Modifier  Intake 49% 51%  Predicted 53% 47% Goal Status+ CK - At least 40 percent but less than 60 percent  10/4/2018 50% 50%  11/5/2018 48% 52% Current Status CK - At least 40 percent but less than 60 percent    Assessment:   Pt has made good progress in therapy with noted improvement in R knee patellar mobility and significant decrease in crepitus with patellofemoral joint mobs in all directions and with active quad set. She presents with improved hip strength and knee AROM. She is appropriate for DC to HEP at this time. Discussed several times with patient importance of performing HEP and if symptoms increase but she is not performing HEP regularly, then she needs to perform HEP more regularly; pt verbalized agreement. Pt is agreeable to DC to HEP.     Patient has made good progress towards established goals.    New/Revised goals: none  Short Term Goals:  4 weeks  1. Pt will present with increased R knee flexion by 5 degrees.  - met  2. Pt will present with decreased inflammation to R patellar tendon to allow for increased patellar mobility and decreased pain with standing for pain level of 6/10. - met  3. Pt will present with increased R knee ext to equal L for increased ease with heel strike during ambulation. - met  4. Pt will present with increased hip abd MMT by one half grade for increased stability during ambulation and decreased stress to R knee. - met   5. Pt will present with increased R knee MMT into flex and ext to 5/5 for increased stability with ambulation. -met  6. Pt will present with increased HS length on R by 5 degrees for decreased stress to R knee. - met     Long Term Goals: 6-8 weeks  1. Pt will present with increased R knee flexion by 10 degrees.  - MET  2. Pt will present with decreased inflammation to R patellar tendon to allow for increased patellar mobility and decreased pain with standing for  pain level of 4/10.- MET  3. Pt will present with increased hip abd MMT by one full grade for increased stability during ambulation and decreased stress to R knee. - MET  4. Pt will present with increased R knee MMT into flex and ext to 5/5 for increased stability with ambulation. -MET   5. Pt will be independent with HEP and self management of symptoms. - MET    Plan:  DC to HEP

## 2019-01-30 ENCOUNTER — INFUSION (OUTPATIENT)
Dept: INFUSION THERAPY | Facility: HOSPITAL | Age: 84
End: 2019-01-30
Attending: INTERNAL MEDICINE
Payer: COMMERCIAL

## 2019-01-30 ENCOUNTER — LAB VISIT (OUTPATIENT)
Dept: LAB | Facility: HOSPITAL | Age: 84
End: 2019-01-30
Attending: NURSE PRACTITIONER
Payer: COMMERCIAL

## 2019-01-30 ENCOUNTER — OFFICE VISIT (OUTPATIENT)
Dept: HEMATOLOGY/ONCOLOGY | Facility: CLINIC | Age: 84
End: 2019-01-30
Payer: COMMERCIAL

## 2019-01-30 VITALS
RESPIRATION RATE: 20 BRPM | SYSTOLIC BLOOD PRESSURE: 168 MMHG | TEMPERATURE: 98 F | BODY MASS INDEX: 24.5 KG/M2 | HEIGHT: 63 IN | WEIGHT: 138.25 LBS | DIASTOLIC BLOOD PRESSURE: 70 MMHG | HEART RATE: 59 BPM

## 2019-01-30 VITALS
WEIGHT: 138.25 LBS | RESPIRATION RATE: 20 BRPM | SYSTOLIC BLOOD PRESSURE: 168 MMHG | BODY MASS INDEX: 24.5 KG/M2 | HEIGHT: 63 IN | DIASTOLIC BLOOD PRESSURE: 70 MMHG | HEART RATE: 59 BPM | TEMPERATURE: 98 F

## 2019-01-30 DIAGNOSIS — M81.0 OSTEOPOROSIS, SENILE: Primary | ICD-10-CM

## 2019-01-30 DIAGNOSIS — Z85.3 HISTORY OF RIGHT BREAST CANCER: ICD-10-CM

## 2019-01-30 DIAGNOSIS — I10 ESSENTIAL HYPERTENSION: ICD-10-CM

## 2019-01-30 DIAGNOSIS — M81.0 OSTEOPOROSIS, SENILE: ICD-10-CM

## 2019-01-30 DIAGNOSIS — S72.001A HIP FRACTURE, RIGHT, CLOSED, INITIAL ENCOUNTER: ICD-10-CM

## 2019-01-30 LAB
ANION GAP SERPL CALC-SCNC: 9 MMOL/L
BUN SERPL-MCNC: 17 MG/DL
CALCIUM SERPL-MCNC: 9.6 MG/DL
CHLORIDE SERPL-SCNC: 102 MMOL/L
CO2 SERPL-SCNC: 27 MMOL/L
CREAT SERPL-MCNC: 0.59 MG/DL
EST. GFR  (AFRICAN AMERICAN): >60 ML/MIN/1.73 M^2
EST. GFR  (NON AFRICAN AMERICAN): >60 ML/MIN/1.73 M^2
GLUCOSE SERPL-MCNC: 110 MG/DL
POTASSIUM SERPL-SCNC: 4.1 MMOL/L
SODIUM SERPL-SCNC: 138 MMOL/L

## 2019-01-30 PROCEDURE — 99999 PR PBB SHADOW E&M-EST. PATIENT-LVL V: ICD-10-PCS | Mod: PBBFAC,,, | Performed by: NURSE PRACTITIONER

## 2019-01-30 PROCEDURE — 1101F PT FALLS ASSESS-DOCD LE1/YR: CPT | Mod: CPTII,S$GLB,, | Performed by: NURSE PRACTITIONER

## 2019-01-30 PROCEDURE — 1101F PR PT FALLS ASSESS DOC 0-1 FALLS W/OUT INJ PAST YR: ICD-10-PCS | Mod: CPTII,S$GLB,, | Performed by: NURSE PRACTITIONER

## 2019-01-30 PROCEDURE — 99214 OFFICE O/P EST MOD 30 MIN: CPT | Mod: S$GLB,,, | Performed by: NURSE PRACTITIONER

## 2019-01-30 PROCEDURE — 96372 THER/PROPH/DIAG INJ SC/IM: CPT | Mod: PN

## 2019-01-30 PROCEDURE — 80048 BASIC METABOLIC PNL TOTAL CA: CPT | Mod: PN

## 2019-01-30 PROCEDURE — 99214 PR OFFICE/OUTPT VISIT, EST, LEVL IV, 30-39 MIN: ICD-10-PCS | Mod: S$GLB,,, | Performed by: NURSE PRACTITIONER

## 2019-01-30 PROCEDURE — 36415 COLL VENOUS BLD VENIPUNCTURE: CPT | Mod: PN

## 2019-01-30 PROCEDURE — 63600175 PHARM REV CODE 636 W HCPCS: Mod: JG,PN | Performed by: NURSE PRACTITIONER

## 2019-01-30 PROCEDURE — 80048 BASIC METABOLIC PNL TOTAL CA: CPT

## 2019-01-30 PROCEDURE — 99999 PR PBB SHADOW E&M-EST. PATIENT-LVL V: CPT | Mod: PBBFAC,,, | Performed by: NURSE PRACTITIONER

## 2019-01-30 PROCEDURE — 63600175 PHARM REV CODE 636 W HCPCS: Mod: JG,PN

## 2019-01-30 RX ADMIN — DENOSUMAB 60 MG: 60 INJECTION SUBCUTANEOUS at 02:01

## 2019-01-30 NOTE — PLAN OF CARE
Problem: Fall Injury Risk  Goal: Absence of Fall and Fall-Related Injury    Intervention: Identify and Manage Contributors to Fall Injury Risk  Pt receiving prolia pt continues with vit D and calcium supplement. Also educated to refrain from invasive dental procedures. Pt verbalized understanding

## 2019-01-30 NOTE — PROGRESS NOTES
HISTORY OF PRESENT ILLNESS:  This is an 86-year-old white female known   to Dr. 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    The patient is status post lumpectomy, post-lumpectomy irradiation and five years   of adjuvant Arimidex/Zometa.  The patient has had bilateral breast augmentation   following surgery.  She is followed annually in June/July. The patient also carries a   diagnosis for melanoma which was widely resected in her right lower extremity.    The patient had carried a diagnosis of osteopenia with high risk for fracture.  She   suffered a fall while on a cruise in April 2015 and sustained a right hip fracture.    Her diagnosis was subsequently advanced to osteoporosis.      The patient presents to the clinic today for evaluation prior to her next Prolia injection.    She remains compliant in taking calcium and vitamin D supplementation.  She   denies any invasive dental procedures in the last three months nor plans for the future.    She denies any recent fevers, chills, cold, cough, congestion, bleeding, dysuria,etc.   No other new complaints or pertinent findings on a 14-point review of systems.    PHYSICAL EXAMINATION:  GENERAL:  Well-developed, elderly white female in no acute distress.    Alert and oriented x3.  VITAL SIGNS:  Weight:  Gain of 3 pounds in 6 months  Wt Readings from Last 3 Encounters:   01/30/19 62.7 kg (138 lb 3.7 oz)   01/30/19 62.7 kg (138 lb 3.7 oz)   10/11/18 61.3 kg (135 lb 2.3 oz)     Temp Readings from Last 3 Encounters:   01/30/19 97.9 °F (36.6 °C)   01/30/19 97.9 °F (36.6 °C)   10/11/18 98.7 °F (37.1 °C) (Oral)     BP Readings from Last 3 Encounters:   01/30/19 (!) 168/70   01/30/19 (!) 168/70   10/11/18 132/64     Pulse Readings from Last 3 Encounters:   01/30/19 (!) 59   01/30/19 (!) 59   10/11/18 72     HEENT:  Normocephalic, atraumatic.  Oral mucosa pink and moist.  Lips without   lesions.  Tongue midline.  Oropharynx clear.   Nonicteric sclerae.  NECK:  Supple.  No adenopathy.  HEART:  Regular rate and rhythm without murmur, gallop or rub.  LUNGS:  Clear to auscultation bilaterally.  ABDOMEN:  Soft, nontender and nondistended with positive normoactive bowel   sounds.  No hepatosplenomegaly.  EXTREMITIES:  No cyanosis, clubbing or edema.  Distal pulses are intact.    LABORATORY:    BMP  Lab Results   Component Value Date     01/30/2019    K 4.1 01/30/2019     01/30/2019    CO2 27 01/30/2019    BUN 17 01/30/2019    CREATININE 0.59 01/30/2019    CALCIUM 9.6 01/30/2019    ANIONGAP 9 01/30/2019    ESTGFRAFRICA >60 01/30/2019    EGFRNONAA >60 01/30/2019     RADIOLOGY:  BMD dated 08/22/18:  Impression:  Osteopenia with improved vertebral &  Femoral neck bone densities.    IMPRESSION:  1.  Osteoporosis.  2.  Status post right hip fracture in April 2015.  3.  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  4.  Completely resected malignant melanoma involving the right lower extremity.  5.  HTN - follows with PCP, Dr. Salinas    PLAN:  1.  Proceed with Prolia 60 mg subQ today.  2.  Continue taking calcium and vitamin D supplementation and notify the office for any need of invasive dental work.  3.  Return to clinic in 6 months for evaluation prior to next breast & Prolia with interval   CBC, CMP, LDH, Vitamin D, CXR & Mammogram (on or after 08/22/19) - appt after Mammo      Assessment/plan reviewed and approved by Dr. Santizo.

## 2019-01-30 NOTE — PLAN OF CARE
Problem: Adult Inpatient Plan of Care  Goal: Plan of Care Review  Outcome: Ongoing (interventions implemented as appropriate)  Pt tolerated tx well. AVS given to pt Pt educated to call Dr with any issues. Pt verbalized understanding. Pt adequate for discharge.

## 2019-02-04 ENCOUNTER — OFFICE VISIT (OUTPATIENT)
Dept: PODIATRY | Facility: CLINIC | Age: 84
End: 2019-02-04
Payer: COMMERCIAL

## 2019-02-04 VITALS
WEIGHT: 136 LBS | HEART RATE: 76 BPM | DIASTOLIC BLOOD PRESSURE: 61 MMHG | SYSTOLIC BLOOD PRESSURE: 145 MMHG | HEIGHT: 63 IN | RESPIRATION RATE: 13 BRPM | BODY MASS INDEX: 24.1 KG/M2

## 2019-02-04 DIAGNOSIS — L60.2 ONYCHAUXIS: ICD-10-CM

## 2019-02-04 DIAGNOSIS — B35.1 ONYCHOMYCOSIS DUE TO DERMATOPHYTE: Primary | ICD-10-CM

## 2019-02-04 PROCEDURE — 99999 PR PBB SHADOW E&M-EST. PATIENT-LVL III: CPT | Mod: PBBFAC,,, | Performed by: PODIATRIST

## 2019-02-04 PROCEDURE — 99999 PR PBB SHADOW E&M-EST. PATIENT-LVL III: ICD-10-PCS | Mod: PBBFAC,,, | Performed by: PODIATRIST

## 2019-02-04 PROCEDURE — 17999 UNLISTD PX SKN MUC MEMB SUBQ: CPT | Mod: CSM,S$GLB,, | Performed by: PODIATRIST

## 2019-02-04 PROCEDURE — 99024 POSTOP FOLLOW-UP VISIT: CPT | Mod: DBM,,, | Performed by: PODIATRIST

## 2019-02-04 PROCEDURE — 99024 PR POST-OP FOLLOW-UP VISIT: ICD-10-PCS | Mod: DBM,,, | Performed by: PODIATRIST

## 2019-02-04 PROCEDURE — 17999 PR NON-COVERED FOOT CARE: ICD-10-PCS | Mod: CSM,S$GLB,, | Performed by: PODIATRIST

## 2019-05-02 ENCOUNTER — OFFICE VISIT (OUTPATIENT)
Dept: PODIATRY | Facility: CLINIC | Age: 84
End: 2019-05-02
Payer: COMMERCIAL

## 2019-05-02 VITALS — WEIGHT: 136 LBS | BODY MASS INDEX: 24.1 KG/M2 | HEIGHT: 63 IN

## 2019-05-02 DIAGNOSIS — L60.2 ONYCHAUXIS: ICD-10-CM

## 2019-05-02 DIAGNOSIS — B35.1 ONYCHOMYCOSIS DUE TO DERMATOPHYTE: Primary | ICD-10-CM

## 2019-05-02 PROCEDURE — 17999 PR NON-COVERED FOOT CARE: ICD-10-PCS | Mod: CSM,S$GLB,, | Performed by: PODIATRIST

## 2019-05-02 PROCEDURE — 99024 POSTOP FOLLOW-UP VISIT: CPT | Mod: DBM,,, | Performed by: PODIATRIST

## 2019-05-02 PROCEDURE — 99024 PR POST-OP FOLLOW-UP VISIT: ICD-10-PCS | Mod: DBM,,, | Performed by: PODIATRIST

## 2019-05-02 PROCEDURE — 17999 UNLISTD PX SKN MUC MEMB SUBQ: CPT | Mod: CSM,S$GLB,, | Performed by: PODIATRIST

## 2019-05-02 PROCEDURE — 99999 PR PBB SHADOW E&M-EST. PATIENT-LVL II: ICD-10-PCS | Mod: PBBFAC,,, | Performed by: PODIATRIST

## 2019-05-02 PROCEDURE — 99999 PR PBB SHADOW E&M-EST. PATIENT-LVL II: CPT | Mod: PBBFAC,,, | Performed by: PODIATRIST

## 2019-06-03 DIAGNOSIS — F41.9 ANXIETY: ICD-10-CM

## 2019-06-04 RX ORDER — DIAZEPAM 5 MG/1
5 TABLET ORAL DAILY
Qty: 30 TABLET | Refills: 0 | Status: SHIPPED | OUTPATIENT
Start: 2019-06-04 | End: 2019-06-24 | Stop reason: SDUPTHER

## 2019-06-04 RX ORDER — DIAZEPAM 5 MG/1
TABLET ORAL
Qty: 90 TABLET | Refills: 0 | OUTPATIENT
Start: 2019-06-04

## 2019-06-04 NOTE — TELEPHONE ENCOUNTER
----- Message from Julee Cordoba sent at 6/4/2019 10:40 AM CDT -----  Type:  RX Refill Request    Who Called:  Patient  Refill or New Rx:  Refill  RX Name and Strength:  diazePAM (VALIUM) 5 MG tablet   How is the patient currently taking it? (ex. 1XDay):  1xday  Is this a 30 day or 90 day RX:  90 pills  Preferred Pharmacy with phone number:    [facility] [id number] [place] [address] & [address] [address]  Phone: [phone] Fax: [phone]      Local or Mail Order:  Local  Ordering Provider:  Same  Best Call Back Number:  [phone] (home)     Additional Information:  Please call when completed.

## 2019-06-19 RX ORDER — BENAZEPRIL HYDROCHLORIDE 40 MG/1
TABLET ORAL
Qty: 90 TABLET | Refills: 0 | Status: SHIPPED | OUTPATIENT
Start: 2019-06-19 | End: 2019-06-24 | Stop reason: SDUPTHER

## 2019-06-24 ENCOUNTER — OFFICE VISIT (OUTPATIENT)
Dept: FAMILY MEDICINE | Facility: CLINIC | Age: 84
End: 2019-06-24
Payer: COMMERCIAL

## 2019-06-24 VITALS
TEMPERATURE: 98 F | WEIGHT: 133.63 LBS | HEART RATE: 60 BPM | HEIGHT: 63 IN | DIASTOLIC BLOOD PRESSURE: 62 MMHG | OXYGEN SATURATION: 98 % | BODY MASS INDEX: 23.68 KG/M2 | SYSTOLIC BLOOD PRESSURE: 148 MMHG

## 2019-06-24 DIAGNOSIS — F41.9 ANXIETY: ICD-10-CM

## 2019-06-24 DIAGNOSIS — I10 ESSENTIAL HYPERTENSION: Primary | ICD-10-CM

## 2019-06-24 PROCEDURE — 1101F PT FALLS ASSESS-DOCD LE1/YR: CPT | Mod: CPTII,S$GLB,, | Performed by: FAMILY MEDICINE

## 2019-06-24 PROCEDURE — 99214 PR OFFICE/OUTPT VISIT, EST, LEVL IV, 30-39 MIN: ICD-10-PCS | Mod: S$GLB,,, | Performed by: FAMILY MEDICINE

## 2019-06-24 PROCEDURE — 1101F PR PT FALLS ASSESS DOC 0-1 FALLS W/OUT INJ PAST YR: ICD-10-PCS | Mod: CPTII,S$GLB,, | Performed by: FAMILY MEDICINE

## 2019-06-24 PROCEDURE — 99999 PR PBB SHADOW E&M-EST. PATIENT-LVL III: CPT | Mod: PBBFAC,,, | Performed by: FAMILY MEDICINE

## 2019-06-24 PROCEDURE — 99214 OFFICE O/P EST MOD 30 MIN: CPT | Mod: S$GLB,,, | Performed by: FAMILY MEDICINE

## 2019-06-24 PROCEDURE — 99999 PR PBB SHADOW E&M-EST. PATIENT-LVL III: ICD-10-PCS | Mod: PBBFAC,,, | Performed by: FAMILY MEDICINE

## 2019-06-24 RX ORDER — BENAZEPRIL HYDROCHLORIDE 40 MG/1
40 TABLET ORAL DAILY
Qty: 90 TABLET | Refills: 3 | Status: SHIPPED | OUTPATIENT
Start: 2019-06-24 | End: 2020-05-06 | Stop reason: SDUPTHER

## 2019-06-24 RX ORDER — DIAZEPAM 5 MG/1
5 TABLET ORAL DAILY
Qty: 90 TABLET | Refills: 0 | Status: SHIPPED | OUTPATIENT
Start: 2019-06-24 | End: 2019-10-21 | Stop reason: SDUPTHER

## 2019-06-24 NOTE — PROGRESS NOTES
"Subjective:       Patient ID: Judy Thompson is a 86 y.o. female.    Chief Complaint: Follow-up (6 month office visit) and Medication Refill    Follow-up hypertension.  She has not checked her blood pressure recently.  She complains of some poor balance and may be interested in having physical therapy sessions again in a few months.  No chest pain. She still works on a part-time basis.  She takes a half a Valium a day.  She tolerates benazepril 40 mg and amlodipine 2.5 mg.  She does not complain of leg swelling. She gets Prolia for osteoporosis.  She is due for follow-up with Hematology in July.  They have lab work scheduled for her.    Review of Systems   Constitutional: Negative for appetite change, fever and unexpected weight change.   Respiratory: Negative for cough and shortness of breath.    Cardiovascular: Negative for chest pain, palpitations and leg swelling.       Objective:     Blood pressure (!) 148/62, pulse 60, temperature 97.8 °F (36.6 °C), temperature source Oral, height 5' 3" (1.6 m), weight 60.6 kg (133 lb 9.6 oz), SpO2 98 %.      Physical Exam   Constitutional: She appears well-developed and well-nourished. No distress.   Cardiovascular: Normal rate and regular rhythm.   Pulmonary/Chest: Effort normal and breath sounds normal.   Musculoskeletal: She exhibits no edema.   Neurological: She is alert.       Assessment:       1. Essential hypertension    2. Anxiety        Plan:       Her blood pressure is not perfect but certainly is acceptable at her age.  Continue present medication.  Follow-up in 6 months.  I will order physical therapy if she wishes.  The goal would be to improve her balance and prevent falls.  She walks with a cane.      "

## 2019-07-21 RX ORDER — BENAZEPRIL HYDROCHLORIDE 40 MG/1
TABLET ORAL
Qty: 90 TABLET | Refills: 3 | Status: SHIPPED | OUTPATIENT
Start: 2019-07-21 | End: 2019-07-31 | Stop reason: SDUPTHER

## 2019-07-24 ENCOUNTER — HOSPITAL ENCOUNTER (OUTPATIENT)
Dept: RADIOLOGY | Facility: HOSPITAL | Age: 84
Discharge: HOME | End: 2019-07-24
Attending: NURSE PRACTITIONER
Payer: COMMERCIAL

## 2019-07-24 DIAGNOSIS — Z85.3 HISTORY OF RIGHT BREAST CANCER: ICD-10-CM

## 2019-07-24 PROCEDURE — 71046 X-RAY EXAM CHEST 2 VIEWS: CPT | Mod: TC,FY,PO

## 2019-07-24 PROCEDURE — 71046 X-RAY EXAM CHEST 2 VIEWS: CPT | Mod: 26,,, | Performed by: RADIOLOGY

## 2019-07-24 PROCEDURE — 71046 XR CHEST PA AND LATERAL: ICD-10-PCS | Mod: 26,,, | Performed by: RADIOLOGY

## 2019-07-31 ENCOUNTER — OFFICE VISIT (OUTPATIENT)
Dept: HEMATOLOGY/ONCOLOGY | Facility: CLINIC | Age: 84
End: 2019-07-31
Payer: COMMERCIAL

## 2019-07-31 ENCOUNTER — DOCUMENTATION ONLY (OUTPATIENT)
Dept: INFUSION THERAPY | Facility: HOSPITAL | Age: 84
End: 2019-07-31

## 2019-07-31 VITALS
HEIGHT: 63 IN | HEART RATE: 72 BPM | BODY MASS INDEX: 23.36 KG/M2 | SYSTOLIC BLOOD PRESSURE: 133 MMHG | WEIGHT: 131.81 LBS | OXYGEN SATURATION: 96 % | TEMPERATURE: 98 F | RESPIRATION RATE: 18 BRPM | DIASTOLIC BLOOD PRESSURE: 70 MMHG

## 2019-07-31 DIAGNOSIS — C50.911 MALIGNANT NEOPLASM OF RIGHT FEMALE BREAST, UNSPECIFIED ESTROGEN RECEPTOR STATUS, UNSPECIFIED SITE OF BREAST: Primary | ICD-10-CM

## 2019-07-31 DIAGNOSIS — M81.0 OSTEOPOROSIS, SENILE: ICD-10-CM

## 2019-07-31 PROCEDURE — 99999 PR PBB SHADOW E&M-EST. PATIENT-LVL IV: ICD-10-PCS | Mod: PBBFAC,,, | Performed by: NURSE PRACTITIONER

## 2019-07-31 PROCEDURE — 1101F PT FALLS ASSESS-DOCD LE1/YR: CPT | Mod: CPTII,S$GLB,, | Performed by: NURSE PRACTITIONER

## 2019-07-31 PROCEDURE — 1101F PR PT FALLS ASSESS DOC 0-1 FALLS W/OUT INJ PAST YR: ICD-10-PCS | Mod: CPTII,S$GLB,, | Performed by: NURSE PRACTITIONER

## 2019-07-31 PROCEDURE — 99999 PR PBB SHADOW E&M-EST. PATIENT-LVL IV: CPT | Mod: PBBFAC,,, | Performed by: NURSE PRACTITIONER

## 2019-07-31 PROCEDURE — 99213 OFFICE O/P EST LOW 20 MIN: CPT | Mod: S$GLB,,, | Performed by: NURSE PRACTITIONER

## 2019-07-31 PROCEDURE — 99213 PR OFFICE/OUTPT VISIT, EST, LEVL III, 20-29 MIN: ICD-10-PCS | Mod: S$GLB,,, | Performed by: NURSE PRACTITIONER

## 2019-07-31 NOTE — PROGRESS NOTES
[7/31/2019 2:41 PM]  Charles Cross:    No Prolia for Thompson #[id number]; f/u in 6 months     [7/31/2019 2:41 PM]    ok thanks

## 2019-07-31 NOTE — PROGRESS NOTES
HISTORY OF PRESENT ILLNESS:  This is an 86-year-old white female known   to Dr. 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    The patient is status post lumpectomy, post-lumpectomy irradiation and five years   of adjuvant Arimidex/Zometa.  The patient has had bilateral breast augmentation   following surgery.  She is followed annually in June/July. The patient also carries a   diagnosis for melanoma which was widely resected in her right lower extremity.    The patient had carried a diagnosis of osteopenia with high risk for fracture.  She   suffered a fall while on a cruise in April 2015 and sustained a right hip fracture.    Her diagnosis was subsequently advanced to osteoporosis.      The patient presents to the clinic today for annual breast evaluation and next Prolia.   She reports that she will be having invasive dental work and needs to postpone   Prolia.  She denies any new breast complaints or concerns, bleeding, fevers, chills,   drenching night sweats, abdominal discomfort, etc.  No other new complaints or   pertinent findings on a 14-point review of systems.    PHYSICAL EXAMINATION:  GENERAL:  Well-developed, elderly white female in no acute distress.    Alert and oriented x3.  VITAL SIGNS:  Weight:  Loss of 6 1/2  pounds in 6 months  Wt Readings from Last 3 Encounters:   07/31/19 59.8 kg (131 lb 13.4 oz)   06/24/19 60.6 kg (133 lb 9.6 oz)   05/06/19 61.7 kg (136 lb)     Temp Readings from Last 3 Encounters:   07/31/19 97.7 °F (36.5 °C) (Oral)   06/24/19 97.8 °F (36.6 °C) (Oral)   01/30/19 97.9 °F (36.6 °C)     BP Readings from Last 3 Encounters:   07/31/19 133/70   06/24/19 (!) 148/62   05/06/19 124/61     Pulse Readings from Last 3 Encounters:   07/31/19 72   06/24/19 60   05/06/19 69     HEENT:  Normocephalic, atraumatic.  Oral mucosa pink and moist.  Lips without   lesions.  Tongue midline.  Oropharynx clear.  Nonicteric sclerae.  NECK:  Supple.  No  adenopathy.  HEART:  Regular rate and rhythm without murmur, gallop or rub.  LUNGS:  Clear to auscultation bilaterally.  NL respiratory effort.  ABDOMEN:  Soft, nontender and nondistended with positive normoactive bowel   sounds.  No hepatosplenomegaly.  EXTREMITIES:  No cyanosis, clubbing or edema.  Distal pulses are intact.  AXILLAE AND GROIN:  No palpable pathologic lymphadenopathy is appreciated.        SKIN:  Intact/turgor normal                                                       NEUROLOGIC:  Cranial nerves II-XII grossly intact.  Motor:  Good muscle bulk and   tone.  Strength/sensory 5/5 throughout.  Gait stable.   BREASTS: Bilateral reconstruction with no signs of reoccurrence.    LABORATORY:    Lab Results   Component Value Date    WBC 6.12 07/24/2019    HGB 13.2 07/24/2019    HCT 39.8 07/24/2019    MCV 88 07/24/2019     07/24/2019     Unremarkable differential    CMP  Sodium   Date Value Ref Range Status   07/24/2019 136 136 - 145 mmol/L Final     Potassium   Date Value Ref Range Status   07/24/2019 4.1 3.5 - 5.1 mmol/L Final     Chloride   Date Value Ref Range Status   07/24/2019 102 95 - 110 mmol/L Final     CO2   Date Value Ref Range Status   07/24/2019 24 23 - 29 mmol/L Final     Glucose   Date Value Ref Range Status   07/24/2019 91 70 - 110 mg/dL Final     BUN, Bld   Date Value Ref Range Status   07/24/2019 15 8 - 23 mg/dL Final     Creatinine   Date Value Ref Range Status   07/24/2019 0.7 0.5 - 1.4 mg/dL Final     Calcium   Date Value Ref Range Status   07/24/2019 9.4 8.7 - 10.5 mg/dL Final     Total Protein   Date Value Ref Range Status   07/24/2019 7.5 6.0 - 8.4 g/dL Final     Albumin   Date Value Ref Range Status   07/24/2019 4.5 3.5 - 5.2 g/dL Final     Total Bilirubin   Date Value Ref Range Status   07/24/2019 0.3 0.1 - 1.0 mg/dL Final     Comment:     For infants and newborns, interpretation of results should be based  on gestational age, weight and in agreement with  clinical  observations.  Premature Infant recommended reference ranges:  Up to 24 hours.............<8.0 mg/dL  Up to 48 hours............<12.0 mg/dL  3-5 days..................<15.0 mg/dL  6-29 days.................<15.0 mg/dL       Alkaline Phosphatase   Date Value Ref Range Status   07/24/2019 38 (L) 55 - 135 U/L Final     AST   Date Value Ref Range Status   07/24/2019 20 10 - 40 U/L Final     ALT   Date Value Ref Range Status   07/24/2019 15 10 - 44 U/L Final     Anion Gap   Date Value Ref Range Status   07/24/2019 10 8 - 16 mmol/L Final     eGFR if    Date Value Ref Range Status   07/24/2019 >60 >60 mL/min/1.73 m^2 Final     eGFR if non    Date Value Ref Range Status   07/24/2019 >60 >60 mL/min/1.73 m^2 Final     Comment:     Calculation used to obtain the estimated glomerular filtration  rate (eGFR) is the CKD-EPI equation.        LDH:  196  Vitamin D:  74    RADIOLOGY:  CXR dated 07/24/19:  Impression  No evidence of acute cardiopulmonary disease & no significant change relative to August 22, 2018.    Mammogram scheduled for 08/23/2019    IMPRESSION:  1.  Osteoporosis - HOLD Prolia in light of invasive dental work  2.  Status post right hip fracture in April 2015.  3.  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  4.  Completely resected malignant melanoma involving the right lower extremity.  5.  HTN - follows with PCP, Dr. Salinas    PLAN:  1.  HOLD Prolia today; reevaluate in 6 months with BMP, VIT D.  2.  Continue taking calcium and vitamin D supplementation.  3.  Return to clinic in 1 year for annual breast evaluation & Prolia with interval    CBC, CMP, LDH, Vitamin D, CXR & Mammogram - appt after Mammo      Assessment/plan reviewed and approved by Dr. Santizo.

## 2019-08-23 ENCOUNTER — HOSPITAL ENCOUNTER (OUTPATIENT)
Dept: RADIOLOGY | Facility: HOSPITAL | Age: 84
Discharge: HOME | End: 2019-08-23
Attending: NURSE PRACTITIONER
Payer: COMMERCIAL

## 2019-08-23 DIAGNOSIS — Z85.3 HISTORY OF RIGHT BREAST CANCER: ICD-10-CM

## 2019-08-23 PROCEDURE — 77067 SCR MAMMO BI INCL CAD: CPT | Mod: TC,PO

## 2019-08-23 PROCEDURE — 77067 MAMMO DIGITAL SCREENING BILAT WITH TOMOSYNTHESIS_CAD: ICD-10-PCS | Mod: 26,,, | Performed by: RADIOLOGY

## 2019-08-23 PROCEDURE — 77063 MAMMO DIGITAL SCREENING BILAT WITH TOMOSYNTHESIS_CAD: ICD-10-PCS | Mod: 26,,, | Performed by: RADIOLOGY

## 2019-08-23 PROCEDURE — 77063 BREAST TOMOSYNTHESIS BI: CPT | Mod: 26,,, | Performed by: RADIOLOGY

## 2019-08-23 PROCEDURE — 77067 SCR MAMMO BI INCL CAD: CPT | Mod: 26,,, | Performed by: RADIOLOGY

## 2019-10-21 DIAGNOSIS — F41.9 ANXIETY: ICD-10-CM

## 2019-10-21 RX ORDER — DIAZEPAM 5 MG/1
TABLET ORAL
Qty: 90 TABLET | Refills: 0 | Status: SHIPPED | OUTPATIENT
Start: 2019-10-21 | End: 2019-11-27 | Stop reason: SDUPTHER

## 2019-11-10 DIAGNOSIS — I10 ESSENTIAL HYPERTENSION: ICD-10-CM

## 2019-11-11 RX ORDER — AMLODIPINE BESYLATE 2.5 MG/1
TABLET ORAL
Qty: 90 TABLET | Refills: 0 | Status: SHIPPED | OUTPATIENT
Start: 2019-11-11 | End: 2020-02-07

## 2019-11-19 ENCOUNTER — TELEPHONE (OUTPATIENT)
Dept: FAMILY MEDICINE | Facility: CLINIC | Age: 84
End: 2019-11-19

## 2019-11-19 NOTE — TELEPHONE ENCOUNTER
----- Message from Julee Cordoba sent at 11/19/2019  1:45 PM CST -----  Type: Needs Medical Advice    Who Called:  Patient  Best Call Back Number: [phone] (home)     Additional Information: Needs to know when her next pneumonia shot is due. Please call to advise.

## 2019-11-20 NOTE — TELEPHONE ENCOUNTER
----- Message from Cinthia Peña sent at 11/20/2019 11:57 AM CST -----  Contact: [phone]/self  Patient states she is returning your call. Please advise.

## 2019-11-27 DIAGNOSIS — F41.9 ANXIETY: ICD-10-CM

## 2019-11-27 RX ORDER — DIAZEPAM 5 MG/1
TABLET ORAL
Qty: 90 TABLET | Refills: 0 | Status: SHIPPED | OUTPATIENT
Start: 2019-11-27 | End: 2020-03-13 | Stop reason: SDUPTHER

## 2020-01-01 ENCOUNTER — INFUSION (OUTPATIENT)
Dept: INFUSION THERAPY | Facility: HOSPITAL | Age: 85
End: 2020-01-01
Attending: NURSE PRACTITIONER
Payer: COMMERCIAL

## 2020-01-01 ENCOUNTER — TELEPHONE (OUTPATIENT)
Dept: HEMATOLOGY/ONCOLOGY | Facility: CLINIC | Age: 85
End: 2020-01-01

## 2020-01-01 ENCOUNTER — HOSPITAL ENCOUNTER (OUTPATIENT)
Dept: RADIOLOGY | Facility: HOSPITAL | Age: 85
Discharge: HOME | End: 2020-09-04
Attending: NURSE PRACTITIONER
Payer: COMMERCIAL

## 2020-01-01 ENCOUNTER — OFFICE VISIT (OUTPATIENT)
Dept: HEMATOLOGY/ONCOLOGY | Facility: CLINIC | Age: 85
End: 2020-01-01
Payer: COMMERCIAL

## 2020-01-01 VITALS
OXYGEN SATURATION: 96 % | DIASTOLIC BLOOD PRESSURE: 70 MMHG | TEMPERATURE: 98 F | HEIGHT: 63 IN | BODY MASS INDEX: 22.69 KG/M2 | HEART RATE: 60 BPM | WEIGHT: 128.06 LBS | SYSTOLIC BLOOD PRESSURE: 142 MMHG | RESPIRATION RATE: 18 BRPM

## 2020-01-01 VITALS
HEIGHT: 63 IN | SYSTOLIC BLOOD PRESSURE: 142 MMHG | RESPIRATION RATE: 18 BRPM | DIASTOLIC BLOOD PRESSURE: 70 MMHG | HEART RATE: 60 BPM | BODY MASS INDEX: 22.69 KG/M2 | WEIGHT: 128.06 LBS | TEMPERATURE: 98 F

## 2020-01-01 DIAGNOSIS — S72.001A HIP FRACTURE, RIGHT, CLOSED, INITIAL ENCOUNTER: ICD-10-CM

## 2020-01-01 DIAGNOSIS — Z17.0 MALIGNANT NEOPLASM OF RIGHT BREAST IN FEMALE, ESTROGEN RECEPTOR POSITIVE, UNSPECIFIED SITE OF BREAST: ICD-10-CM

## 2020-01-01 DIAGNOSIS — Z85.3 HISTORY OF RIGHT BREAST CANCER: Primary | ICD-10-CM

## 2020-01-01 DIAGNOSIS — Z85.3: ICD-10-CM

## 2020-01-01 DIAGNOSIS — M81.0 OSTEOPOROSIS, SENILE: Primary | ICD-10-CM

## 2020-01-01 DIAGNOSIS — I10 ESSENTIAL HYPERTENSION: ICD-10-CM

## 2020-01-01 DIAGNOSIS — Z85.3 HISTORY OF RIGHT BREAST CANCER: ICD-10-CM

## 2020-01-01 DIAGNOSIS — Z12.31 ENCOUNTER FOR SCREENING MAMMOGRAM FOR HIGH-RISK PATIENT: ICD-10-CM

## 2020-01-01 DIAGNOSIS — F41.9 ANXIETY: ICD-10-CM

## 2020-01-01 DIAGNOSIS — C50.911 MALIGNANT NEOPLASM OF RIGHT BREAST IN FEMALE, ESTROGEN RECEPTOR POSITIVE, UNSPECIFIED SITE OF BREAST: ICD-10-CM

## 2020-01-01 PROCEDURE — 63600175 PHARM REV CODE 636 W HCPCS: Mod: JG,PN | Performed by: NURSE PRACTITIONER

## 2020-01-01 PROCEDURE — 1101F PR PT FALLS ASSESS DOC 0-1 FALLS W/OUT INJ PAST YR: ICD-10-PCS | Mod: CPTII,S$GLB,, | Performed by: NURSE PRACTITIONER

## 2020-01-01 PROCEDURE — 96372 THER/PROPH/DIAG INJ SC/IM: CPT | Mod: PN

## 2020-01-01 PROCEDURE — 1159F MED LIST DOCD IN RCRD: CPT | Mod: S$GLB,,, | Performed by: NURSE PRACTITIONER

## 2020-01-01 PROCEDURE — 99999 PR PBB SHADOW E&M-EST. PATIENT-LVL V: ICD-10-PCS | Mod: PBBFAC,,, | Performed by: NURSE PRACTITIONER

## 2020-01-01 PROCEDURE — 71046 X-RAY EXAM CHEST 2 VIEWS: CPT | Mod: 26,,, | Performed by: RADIOLOGY

## 2020-01-01 PROCEDURE — 99214 OFFICE O/P EST MOD 30 MIN: CPT | Mod: S$GLB,,, | Performed by: NURSE PRACTITIONER

## 2020-01-01 PROCEDURE — 1159F PR MEDICATION LIST DOCUMENTED IN MEDICAL RECORD: ICD-10-PCS | Mod: S$GLB,,, | Performed by: NURSE PRACTITIONER

## 2020-01-01 PROCEDURE — 71046 XR CHEST PA AND LATERAL: ICD-10-PCS | Mod: 26,,, | Performed by: RADIOLOGY

## 2020-01-01 PROCEDURE — 77067 MAMMO DIGITAL SCREENING BILAT WITH TOMOSYNTHESIS_CAD: ICD-10-PCS | Mod: 26,,, | Performed by: RADIOLOGY

## 2020-01-01 PROCEDURE — 77063 BREAST TOMOSYNTHESIS BI: CPT | Mod: 26,,, | Performed by: RADIOLOGY

## 2020-01-01 PROCEDURE — 99214 PR OFFICE/OUTPT VISIT, EST, LEVL IV, 30-39 MIN: ICD-10-PCS | Mod: S$GLB,,, | Performed by: NURSE PRACTITIONER

## 2020-01-01 PROCEDURE — 99999 PR PBB SHADOW E&M-EST. PATIENT-LVL V: CPT | Mod: PBBFAC,,, | Performed by: NURSE PRACTITIONER

## 2020-01-01 PROCEDURE — 1126F PR PAIN SEVERITY QUANTIFIED, NO PAIN PRESENT: ICD-10-PCS | Mod: S$GLB,,, | Performed by: NURSE PRACTITIONER

## 2020-01-01 PROCEDURE — 77063 MAMMO DIGITAL SCREENING BILAT WITH TOMOSYNTHESIS_CAD: ICD-10-PCS | Mod: 26,,, | Performed by: RADIOLOGY

## 2020-01-01 PROCEDURE — 77067 SCR MAMMO BI INCL CAD: CPT | Mod: TC,PO

## 2020-01-01 PROCEDURE — 71046 X-RAY EXAM CHEST 2 VIEWS: CPT | Mod: TC,FY,PO

## 2020-01-01 PROCEDURE — 77067 SCR MAMMO BI INCL CAD: CPT | Mod: 26,,, | Performed by: RADIOLOGY

## 2020-01-01 PROCEDURE — 1101F PT FALLS ASSESS-DOCD LE1/YR: CPT | Mod: CPTII,S$GLB,, | Performed by: NURSE PRACTITIONER

## 2020-01-01 PROCEDURE — 1126F AMNT PAIN NOTED NONE PRSNT: CPT | Mod: S$GLB,,, | Performed by: NURSE PRACTITIONER

## 2020-01-01 RX ORDER — DIAZEPAM 5 MG/1
TABLET ORAL
Qty: 90 TABLET | Refills: 0 | Status: ON HOLD | OUTPATIENT
Start: 2020-01-01 | End: 2021-01-01 | Stop reason: HOSPADM

## 2020-01-01 RX ADMIN — DENOSUMAB 60 MG: 60 INJECTION SUBCUTANEOUS at 12:09

## 2020-01-30 ENCOUNTER — LAB VISIT (OUTPATIENT)
Dept: LAB | Facility: HOSPITAL | Age: 85
End: 2020-01-30
Attending: NURSE PRACTITIONER
Payer: COMMERCIAL

## 2020-01-30 ENCOUNTER — INFUSION (OUTPATIENT)
Dept: INFUSION THERAPY | Facility: HOSPITAL | Age: 85
End: 2020-01-30
Attending: NURSE PRACTITIONER
Payer: COMMERCIAL

## 2020-01-30 ENCOUNTER — OFFICE VISIT (OUTPATIENT)
Dept: HEMATOLOGY/ONCOLOGY | Facility: CLINIC | Age: 85
End: 2020-01-30
Payer: COMMERCIAL

## 2020-01-30 VITALS
DIASTOLIC BLOOD PRESSURE: 79 MMHG | HEART RATE: 80 BPM | TEMPERATURE: 98 F | RESPIRATION RATE: 18 BRPM | SYSTOLIC BLOOD PRESSURE: 181 MMHG

## 2020-01-30 VITALS
OXYGEN SATURATION: 96 % | RESPIRATION RATE: 18 BRPM | HEIGHT: 63 IN | HEART RATE: 80 BPM | BODY MASS INDEX: 23.01 KG/M2 | SYSTOLIC BLOOD PRESSURE: 181 MMHG | TEMPERATURE: 98 F | DIASTOLIC BLOOD PRESSURE: 79 MMHG | WEIGHT: 129.88 LBS

## 2020-01-30 DIAGNOSIS — M81.0 OSTEOPOROSIS, SENILE: ICD-10-CM

## 2020-01-30 DIAGNOSIS — S72.009S CLOSED FRACTURE OF HIP, UNSPECIFIED LATERALITY, SEQUELA: ICD-10-CM

## 2020-01-30 DIAGNOSIS — M81.0 OSTEOPOROSIS, SENILE: Primary | ICD-10-CM

## 2020-01-30 DIAGNOSIS — S72.001A HIP FRACTURE, RIGHT, CLOSED, INITIAL ENCOUNTER: ICD-10-CM

## 2020-01-30 DIAGNOSIS — I10 ESSENTIAL HYPERTENSION: ICD-10-CM

## 2020-01-30 LAB
25(OH)D3+25(OH)D2 SERPL-MCNC: 96 NG/ML (ref 30–96)
ANION GAP SERPL CALC-SCNC: 12 MMOL/L (ref 8–16)
BUN SERPL-MCNC: 22 MG/DL (ref 7–18)
CALCIUM SERPL-MCNC: 9.4 MG/DL (ref 8.4–10.2)
CHLORIDE SERPL-SCNC: 101 MMOL/L (ref 95–110)
CO2 SERPL-SCNC: 26 MMOL/L (ref 22–31)
CREAT SERPL-MCNC: 0.63 MG/DL (ref 0.5–1.4)
EST. GFR  (AFRICAN AMERICAN): >60 ML/MIN/1.73 M^2
EST. GFR  (NON AFRICAN AMERICAN): >60 ML/MIN/1.73 M^2
GLUCOSE SERPL-MCNC: 93 MG/DL (ref 70–110)
POTASSIUM SERPL-SCNC: 3.8 MMOL/L (ref 3.5–5.1)
SODIUM SERPL-SCNC: 139 MMOL/L (ref 136–145)

## 2020-01-30 PROCEDURE — 82306 VITAMIN D 25 HYDROXY: CPT

## 2020-01-30 PROCEDURE — 82306 VITAMIN D 25 HYDROXY: CPT | Mod: PN

## 2020-01-30 PROCEDURE — 99214 PR OFFICE/OUTPT VISIT, EST, LEVL IV, 30-39 MIN: ICD-10-PCS | Mod: S$GLB,,, | Performed by: NURSE PRACTITIONER

## 2020-01-30 PROCEDURE — 1101F PT FALLS ASSESS-DOCD LE1/YR: CPT | Mod: CPTII,S$GLB,, | Performed by: NURSE PRACTITIONER

## 2020-01-30 PROCEDURE — 99999 PR PBB SHADOW E&M-EST. PATIENT-LVL IV: CPT | Mod: PBBFAC,,, | Performed by: NURSE PRACTITIONER

## 2020-01-30 PROCEDURE — 80048 BASIC METABOLIC PNL TOTAL CA: CPT

## 2020-01-30 PROCEDURE — 99214 OFFICE O/P EST MOD 30 MIN: CPT | Mod: S$GLB,,, | Performed by: NURSE PRACTITIONER

## 2020-01-30 PROCEDURE — 96372 THER/PROPH/DIAG INJ SC/IM: CPT | Mod: PN

## 2020-01-30 PROCEDURE — 36415 COLL VENOUS BLD VENIPUNCTURE: CPT | Mod: PN

## 2020-01-30 PROCEDURE — 1159F MED LIST DOCD IN RCRD: CPT | Mod: S$GLB,,, | Performed by: NURSE PRACTITIONER

## 2020-01-30 PROCEDURE — 1101F PR PT FALLS ASSESS DOC 0-1 FALLS W/OUT INJ PAST YR: ICD-10-PCS | Mod: CPTII,S$GLB,, | Performed by: NURSE PRACTITIONER

## 2020-01-30 PROCEDURE — 99999 PR PBB SHADOW E&M-EST. PATIENT-LVL IV: ICD-10-PCS | Mod: PBBFAC,,, | Performed by: NURSE PRACTITIONER

## 2020-01-30 PROCEDURE — 1159F PR MEDICATION LIST DOCUMENTED IN MEDICAL RECORD: ICD-10-PCS | Mod: S$GLB,,, | Performed by: NURSE PRACTITIONER

## 2020-01-30 PROCEDURE — 63600175 PHARM REV CODE 636 W HCPCS: Mod: JG,PN | Performed by: NURSE PRACTITIONER

## 2020-01-30 PROCEDURE — 1125F AMNT PAIN NOTED PAIN PRSNT: CPT | Mod: S$GLB,,, | Performed by: NURSE PRACTITIONER

## 2020-01-30 PROCEDURE — 80048 BASIC METABOLIC PNL TOTAL CA: CPT | Mod: PN

## 2020-01-30 PROCEDURE — 1125F PR PAIN SEVERITY QUANTIFIED, PAIN PRESENT: ICD-10-PCS | Mod: S$GLB,,, | Performed by: NURSE PRACTITIONER

## 2020-01-30 RX ADMIN — DENOSUMAB 60 MG: 60 INJECTION SUBCUTANEOUS at 03:01

## 2020-01-30 NOTE — PROGRESS NOTES
HISTORY OF PRESENT ILLNESS:  This is an 87-year-old white female known to Dr. 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  The patient is status post lumpectomy, post-lumpectomy irradiation and five years of adjuvant Arimidex/Zometa.  The patient has had bilateral breast augmentation following surgery.  She is followed annually in June/July. The patient also carries a diagnosis for melanoma which was widely resected in her right lower extremity.  The patient had carried a diagnosis of osteopenia with high risk for fracture.  She suffered a fall while on a cruise in April 2015 and sustained a right hip fracture.  Her diagnosis was subsequently advanced to osteoporosis. She is treated with biannual Prolia for this.     The patient presents to the clinic today for evaluation prior to her next Prolia injection.  She remains compliant in taking calcium and vitamin D supplementation.  She denies any invasive dental procedures in the last three months nor plans for the future.    She denies any recent fevers, chills, cold, cough, congestion, bleeding, dysuria,etc.  No other new complaints or pertinent findings on a 14-point review of systems.    PHYSICAL EXAMINATION:  GENERAL:  Well-developed, elderly white female in no acute distress.  Alert and oriented x3.  VITAL SIGNS:  Weight:  Loss of 2 pounds in 6 months  Wt Readings from Last 3 Encounters:   01/30/20 58.9 kg (129 lb 13.6 oz)   07/31/19 59.8 kg (131 lb 13.4 oz)   06/24/19 60.6 kg (133 lb 9.6 oz)     Temp Readings from Last 3 Encounters:   01/30/20 97.7 °F (36.5 °C)   07/31/19 97.7 °F (36.5 °C) (Oral)   06/24/19 97.8 °F (36.6 °C) (Oral)     BP Readings from Last 3 Encounters:   01/30/20 (!) 181/79   07/31/19 133/70   06/24/19 (!) 148/62     Pulse Readings from Last 3 Encounters:   01/30/20 80   07/31/19 72   06/24/19 60     HEENT:  Normocephalic, atraumatic.  Oral mucosa pink and moist.  Lips without lesions.  Tongue midline.   Oropharynx clear.  Nonicteric sclerae.  NECK:  Supple.  No adenopathy.  HEART:  Regular rate and rhythm without murmur, gallop or rub.  LUNGS:  Clear to auscultation bilaterally.  ABDOMEN:  Soft, nontender and nondistended with positive normoactive bowel sounds.  No hepatosplenomegaly.  EXTREMITIES:  No cyanosis, clubbing or edema.  Distal pulses are intact.    LABORATORY:    BMP  Lab Results   Component Value Date     01/30/2020    K 3.8 01/30/2020     01/30/2020    CO2 26 01/30/2020    BUN 22 (H) 01/30/2020    CREATININE 0.63 01/30/2020    CALCIUM 9.4 01/30/2020    ANIONGAP 12 01/30/2020    ESTGFRAFRICA >60 01/30/2020    EGFRNONAA >60 01/30/2020     Vitamin D:  96    RADIOLOGY:  BMD dated 08/22/18:  Impression:  Osteopenia with improved vertebral &  Femoral neck bone densities.    IMPRESSION:  1.  Osteoporosis.  2.  Status post right hip fracture in April 2015.  3.  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  4.  Completely resected malignant melanoma involving the right lower extremity.  5.  HTN - follows with PCP, Dr. Salinas    PLAN:  1.  Proceed with Prolia 60 mg subQ today.  2.  Continue taking calcium and vitamin D supplementation and notify the office for any need of invasive dental work.  3.  Return to clinic in 6 months for evaluation prior to next breast & Prolia with interval CBC, CMP, LDH, Vitamin D, CXR & Mammogram (on or after 08/23/2020) - appt after Mammo      Assessment/plan reviewed and approved by Dr. Santizo.

## 2020-02-04 ENCOUNTER — DOCUMENTATION ONLY (OUTPATIENT)
Dept: INFUSION THERAPY | Facility: HOSPITAL | Age: 85
End: 2020-02-04

## 2020-02-04 NOTE — PROGRESS NOTES
[2/4/2020 3:13 PM]  Yahaira Dial:    mrn [id number] we need to move prolia to 8/28     [2/4/2020 3:14 PM]    mrn [id number] is moved

## 2020-02-06 DIAGNOSIS — I10 ESSENTIAL HYPERTENSION: ICD-10-CM

## 2020-02-07 RX ORDER — AMLODIPINE BESYLATE 2.5 MG/1
TABLET ORAL
Qty: 90 TABLET | Refills: 0 | Status: SHIPPED | OUTPATIENT
Start: 2020-02-07 | End: 2020-05-03 | Stop reason: SDUPTHER

## 2020-03-13 DIAGNOSIS — F41.9 ANXIETY: ICD-10-CM

## 2020-03-13 NOTE — TELEPHONE ENCOUNTER
----- Message from Collette Loo sent at 3/13/2020  3:51 PM CDT -----  Contact: pt [phone]  Refill  Patient called and asked for a refill on her Valium   Takes once a day   90 day refill  To be called into [facility]   Call Back [phone]

## 2020-03-15 RX ORDER — DIAZEPAM 5 MG/1
TABLET ORAL
Qty: 90 TABLET | Refills: 0 | Status: SHIPPED | OUTPATIENT
Start: 2020-03-15 | End: 2020-05-06 | Stop reason: SDUPTHER

## 2020-04-17 ENCOUNTER — TELEPHONE (OUTPATIENT)
Dept: ADMINISTRATIVE | Facility: HOSPITAL | Age: 85
End: 2020-04-17

## 2020-05-03 DIAGNOSIS — I10 ESSENTIAL HYPERTENSION: ICD-10-CM

## 2020-05-03 RX ORDER — AMLODIPINE BESYLATE 2.5 MG/1
TABLET ORAL
Qty: 90 TABLET | Refills: 0 | Status: SHIPPED | OUTPATIENT
Start: 2020-05-03 | End: 2020-01-01 | Stop reason: SDUPTHER

## 2020-05-06 ENCOUNTER — PATIENT MESSAGE (OUTPATIENT)
Dept: ADMINISTRATIVE | Facility: HOSPITAL | Age: 85
End: 2020-05-06

## 2020-05-06 ENCOUNTER — OFFICE VISIT (OUTPATIENT)
Dept: FAMILY MEDICINE | Facility: CLINIC | Age: 85
End: 2020-05-06
Payer: COMMERCIAL

## 2020-05-06 DIAGNOSIS — M17.10 ARTHRITIS OF KNEE: ICD-10-CM

## 2020-05-06 DIAGNOSIS — F41.9 ANXIETY: ICD-10-CM

## 2020-05-06 DIAGNOSIS — C50.919 MALIGNANT NEOPLASM OF FEMALE BREAST, UNSPECIFIED ESTROGEN RECEPTOR STATUS, UNSPECIFIED LATERALITY, UNSPECIFIED SITE OF BREAST: ICD-10-CM

## 2020-05-06 DIAGNOSIS — R26.89 BALANCE PROBLEM: ICD-10-CM

## 2020-05-06 DIAGNOSIS — S72.009S CLOSED FRACTURE OF HIP, UNSPECIFIED LATERALITY, SEQUELA: ICD-10-CM

## 2020-05-06 DIAGNOSIS — I10 ESSENTIAL HYPERTENSION: Primary | ICD-10-CM

## 2020-05-06 PROCEDURE — 1101F PT FALLS ASSESS-DOCD LE1/YR: CPT | Mod: CPTII,,, | Performed by: FAMILY MEDICINE

## 2020-05-06 PROCEDURE — 1159F PR MEDICATION LIST DOCUMENTED IN MEDICAL RECORD: ICD-10-PCS | Mod: ,,, | Performed by: FAMILY MEDICINE

## 2020-05-06 PROCEDURE — 1101F PR PT FALLS ASSESS DOC 0-1 FALLS W/OUT INJ PAST YR: ICD-10-PCS | Mod: CPTII,,, | Performed by: FAMILY MEDICINE

## 2020-05-06 PROCEDURE — 99214 PR OFFICE/OUTPT VISIT, EST, LEVL IV, 30-39 MIN: ICD-10-PCS | Mod: 95,,, | Performed by: FAMILY MEDICINE

## 2020-05-06 PROCEDURE — 1159F MED LIST DOCD IN RCRD: CPT | Mod: ,,, | Performed by: FAMILY MEDICINE

## 2020-05-06 PROCEDURE — 99214 OFFICE O/P EST MOD 30 MIN: CPT | Mod: 95,,, | Performed by: FAMILY MEDICINE

## 2020-05-06 RX ORDER — AMLODIPINE BESYLATE 5 MG/1
5 TABLET ORAL DAILY
Qty: 90 TABLET | Refills: 1 | Status: ON HOLD | OUTPATIENT
Start: 2020-05-06 | End: 2021-01-01 | Stop reason: SDUPTHER

## 2020-05-06 RX ORDER — BENAZEPRIL HYDROCHLORIDE 40 MG/1
40 TABLET ORAL DAILY
Qty: 90 TABLET | Refills: 3 | Status: SHIPPED | OUTPATIENT
Start: 2020-05-06 | End: 2020-07-21

## 2020-05-06 RX ORDER — DIAZEPAM 5 MG/1
TABLET ORAL
Qty: 90 TABLET | Refills: 0 | Status: SHIPPED | OUTPATIENT
Start: 2020-05-06 | End: 2020-01-01

## 2020-05-06 NOTE — PROGRESS NOTES
Established Patient - Audio Only Telehealth Visit     The patient location is: Her office  The chief complaint leading to consultation is: htn  Visit type: Virtual visit with audio only (telephone)  Total time spent with patient: 25 minutes       The reason for the audio only service rather than synchronous audio and video virtual visit was related to technical difficulties or patient preference/necessity.     Each patient to whom I provide medical services by telemedicine is:  (1) informed of the relationship between the physician and patient and the respective role of any other health care provider with respect to management of the patient; and (2) notified that they may decline to receive medical services by telemedicine and may withdraw from such care at any time. Patient verbally consented to receive this service via voice-only telephone call.       HPI: HTN. She does not check. It has been high in clinic. Tolerates amlodipine 2.5 and benazepril 40. Admits to more stress and is taking an extra half tablet of valium on some days. She usually just takes 1/2 of a 5 mg daily. Ongoing monitoring for osteoporosis and hx of breast cancer. I reviewed lab results.  She would like PT for knee arthritis and balance problems.  ROS. No chest pain. Mild dyspnea climbing stairs. I reviewed pharmacologic stress test from 2013. Neg other than LVH. No edema. 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      Assessment and plan:  Increase amlodipine to 5 mg. Monitor BP and send me the readings. PT consult to [facility].        This service was not originating from a related E/M service provided within the previous 7 days nor will  to an E/M service or procedure within the next 24 hours or my soonest available appointment.  Prevailing standard of care was able to be met in this audio-only visit.

## 2020-08-19 NOTE — TELEPHONE ENCOUNTER
S/w pt.  Pt needed to reschedule appts.  All upcoming appts have been rescheduled.  Pt verbalized understanding of changes.  Infusion notified to change prolia, as well.

## 2020-08-19 NOTE — TELEPHONE ENCOUNTER
----- Message from Krystin Hsieh sent at 8/19/2020 10:04 AM CDT -----  Contact: pt  Pt states that she would like to speak to someone in the office please regarding scheduling....[phone]

## 2020-09-08 NOTE — PROGRESS NOTES
"HISTORY OF PRESENT ILLNESS:  This is an 87-year-old white female known to Dr. 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  The patient is status post lumpectomy, post-lumpectomy irradiation and five years of adjuvant Arimidex/Zometa.  The patient has had bilateral breast augmentation following surgery.  She is followed annually in June/July. The patient also carries a diagnosis for melanoma which was widely resected in her right lower extremity.  The patient had carried a diagnosis of osteopenia with high risk for fracture.  She suffered a fall while on a cruise in April 2015 and sustained a right hip fracture.  Her diagnosis was subsequently advanced to osteoporosis. She is treated with biannual Prolia for this.  Last Prolia 01/30/2020.    The patient presents to the clinic today for annual breast/Prolia evaluation.  She remains compliant in taking calcium and vitamin D supplementation.  She denies any invasive dental procedures in the last three months nor plans for the future.  She denies any new breast complaints or concerns, bleeding, fevers, chills, drenching night sweats, abdominal discomfort, fevers, chills, cold, cough, congestion, bleeding, dysuria,etc.  No other new complaints or pertinent findings on a 14-point review of systems.    PHYSICAL EXAMINATION:  GENERAL:  Well-developed, elderly white female in no acute distress.  Alert and oriented x 3.  VITAL SIGNS:    Vitals:    09/08/20 1119 09/08/20 1121 09/08/20 1208   BP: (!) 218/95 (!) 206/77 (!) 142/70   BP Location: Left arm Right arm    Patient Position: Sitting Sitting    BP Method: Medium (Automatic) Medium (Automatic)    Pulse: 60     Resp: 18     Temp: 98 °F (36.7 °C)     TempSrc: Temporal     SpO2: 96%     Weight: 58.1 kg (128 lb 1.4 oz)     Height: 5' 3" (1.6 m)         HEENT:  Normocephalic, atraumatic.  Oral mucosa pink and moist.  Lips without lesions.  Tongue midline.  Oropharynx clear.  Nonicteric " sclerae.  NECK:  Supple.  No adenopathy.  HEART:  Regular rate and rhythm without murmur, gallop or rub.  LUNGS:  Clear to auscultation bilaterally.  NL respiratory effort.  ABDOMEN:  Soft, nontender and nondistended with positive normoactive bowel sounds.  No hepatosplenomegaly.  EXTREMITIES:  No cyanosis, clubbing or edema.  Distal pulses are intact.  AXILLAE AND GROIN:  No palpable pathologic lymphadenopathy is appreciated.        SKIN:  Intact/turgor normal                                                       NEUROLOGIC:  Cranial nerves II-XII grossly intact.  Motor:  Good muscle bulk and tone.  Strength/sensory 5/5 throughout.  Gait stable.   BREASTS: Bilateral reconstruction with no signs of reoccurrence.    LABORATORY:    Lab Results   Component Value Date    WBC 6.81 09/04/2020    RBC 4.12 09/04/2020    HGB 13.2 09/04/2020    HCT 39.8 09/04/2020    MCV 97 09/04/2020    MCH 32.0 (H) 09/04/2020    MCHC 33.2 09/04/2020    RDW 13.0 09/04/2020     09/04/2020    MPV 10.9 09/04/2020    GRAN 3.3 09/04/2020    GRAN 48.8 09/04/2020    LYMPH 2.8 09/04/2020    LYMPH 40.4 09/04/2020    MONO 0.5 09/04/2020    MONO 6.8 09/04/2020    EOS 0.2 09/04/2020    BASO 0.04 09/04/2020    EOSINOPHIL 3.4 09/04/2020    BASOPHIL 0.6 09/04/2020     CMP  Sodium   Date Value Ref Range Status   09/04/2020 139 136 - 145 mmol/L Final     Potassium   Date Value Ref Range Status   09/04/2020 4.1 3.5 - 5.1 mmol/L Final     Chloride   Date Value Ref Range Status   09/04/2020 103 95 - 110 mmol/L Final     CO2   Date Value Ref Range Status   09/04/2020 26 23 - 29 mmol/L Final     Glucose   Date Value Ref Range Status   09/04/2020 103 70 - 110 mg/dL Final     BUN, Bld   Date Value Ref Range Status   09/04/2020 23 8 - 23 mg/dL Final     Creatinine   Date Value Ref Range Status   09/04/2020 0.7 0.5 - 1.4 mg/dL Final     Calcium   Date Value Ref Range Status   09/04/2020 9.8 8.7 - 10.5 mg/dL Final     Total Protein   Date Value Ref Range Status    09/04/2020 7.2 6.0 - 8.4 g/dL Final     Albumin   Date Value Ref Range Status   09/04/2020 4.2 3.5 - 5.2 g/dL Final     Total Bilirubin   Date Value Ref Range Status   09/04/2020 0.5 0.1 - 1.0 mg/dL Final     Comment:     For infants and newborns, interpretation of results should be based  on gestational age, weight and in agreement with clinical  observations.  Premature Infant recommended reference ranges:  Up to 24 hours.............<8.0 mg/dL  Up to 48 hours............<12.0 mg/dL  3-5 days..................<15.0 mg/dL  6-29 days.................<15.0 mg/dL       Alkaline Phosphatase   Date Value Ref Range Status   09/04/2020 40 (L) 55 - 135 U/L Final     AST   Date Value Ref Range Status   09/04/2020 21 10 - 40 U/L Final     ALT   Date Value Ref Range Status   09/04/2020 21 10 - 44 U/L Final     Anion Gap   Date Value Ref Range Status   09/04/2020 10 8 - 16 mmol/L Final     eGFR if    Date Value Ref Range Status   09/04/2020 >60 >60 mL/min/1.73 m^2 Final     eGFR if non    Date Value Ref Range Status   09/04/2020 >60 >60 mL/min/1.73 m^2 Final     Comment:     Calculation used to obtain the estimated glomerular filtration  rate (eGFR) is the CKD-EPI equation.        LDH:  202  Vitamin D:  71    Mammo done 09/04/2020 - not read    CXR dated 09/04/2020:  Impression:  No acute cardiopulmonary process noted.    RADIOLOGY:  BMD dated 08/22/18:  Impression:  Osteopenia with improved vertebral & Femoral neck bone densities.    IMPRESSION:  1.  Osteoporosis - on Prolia  2.  Status post right hip fracture in April 2015.  3.  Stage I right breast carcinoma in association with DCIS --   4.  Completely resected malignant melanoma involving the right lower extremity.  5.  HTN - follows with PCP, Dr. Salinas    PLAN:  1.  Proceed with Prolia 60 mg subQ today.  2.  Continue taking calcium and vitamin D supplementation and notify the office for any need of invasive dental work.  3.  Return to  clinic in 6 months for evaluation prior to next Prolia with interval BMP, Vitamin D & BMD prior.  4.  Follow up in 1 year for annual breast with CBC, CMP, LDH, VIT D, CXR, MAMMO prior.    Assessment/plan reviewed and approved by Dr. Santizo.

## 2020-09-08 NOTE — Clinical Note
Proceed with Prolia today; f/u in 6 months with BMP, Vitamin D & BMD PRIOR; 1 year annual breast/Prolia with CBC, CMP, LDH, VIT D, CXR & Mammo Prior.

## 2021-01-01 ENCOUNTER — PATIENT MESSAGE (OUTPATIENT)
Dept: NEUROLOGY | Facility: CLINIC | Age: 86
End: 2021-01-01

## 2021-01-01 ENCOUNTER — TELEPHONE (OUTPATIENT)
Dept: FAMILY MEDICINE | Facility: CLINIC | Age: 86
End: 2021-01-01

## 2021-01-01 ENCOUNTER — TELEPHONE (OUTPATIENT)
Dept: NEUROLOGY | Facility: CLINIC | Age: 86
End: 2021-01-01

## 2021-01-01 ENCOUNTER — PATIENT MESSAGE (OUTPATIENT)
Dept: FAMILY MEDICINE | Facility: CLINIC | Age: 86
End: 2021-01-01

## 2021-01-01 ENCOUNTER — OFFICE VISIT (OUTPATIENT)
Dept: FAMILY MEDICINE | Facility: CLINIC | Age: 86
End: 2021-01-01
Payer: COMMERCIAL

## 2021-01-01 ENCOUNTER — PATIENT OUTREACH (OUTPATIENT)
Dept: ADMINISTRATIVE | Facility: OTHER | Age: 86
End: 2021-01-01

## 2021-01-01 ENCOUNTER — OUTPATIENT CASE MANAGEMENT (OUTPATIENT)
Dept: RESEARCH | Facility: HOSPITAL | Age: 86
End: 2021-01-01

## 2021-01-01 ENCOUNTER — OFFICE VISIT (OUTPATIENT)
Dept: NEUROLOGY | Facility: CLINIC | Age: 86
End: 2021-01-01
Payer: COMMERCIAL

## 2021-01-01 ENCOUNTER — LAB VISIT (OUTPATIENT)
Dept: LAB | Facility: HOSPITAL | Age: 86
End: 2021-01-01
Attending: FAMILY MEDICINE
Payer: COMMERCIAL

## 2021-01-01 VITALS
DIASTOLIC BLOOD PRESSURE: 65 MMHG | TEMPERATURE: 97 F | RESPIRATION RATE: 20 BRPM | BODY MASS INDEX: 19.47 KG/M2 | HEIGHT: 66 IN | WEIGHT: 121.13 LBS | SYSTOLIC BLOOD PRESSURE: 146 MMHG | HEART RATE: 72 BPM

## 2021-01-01 VITALS
OXYGEN SATURATION: 94 % | DIASTOLIC BLOOD PRESSURE: 60 MMHG | TEMPERATURE: 98 F | HEART RATE: 65 BPM | SYSTOLIC BLOOD PRESSURE: 120 MMHG | WEIGHT: 124.25 LBS | HEIGHT: 66 IN | BODY MASS INDEX: 19.97 KG/M2

## 2021-01-01 VITALS
BODY MASS INDEX: 16.03 KG/M2 | TEMPERATURE: 98 F | WEIGHT: 99.75 LBS | HEIGHT: 66 IN | DIASTOLIC BLOOD PRESSURE: 82 MMHG | SYSTOLIC BLOOD PRESSURE: 130 MMHG

## 2021-01-01 VITALS — DIASTOLIC BLOOD PRESSURE: 60 MMHG | SYSTOLIC BLOOD PRESSURE: 126 MMHG | HEART RATE: 72 BPM | TEMPERATURE: 99 F

## 2021-01-01 DIAGNOSIS — F02.818 LATE ONSET ALZHEIMER'S DISEASE WITH BEHAVIORAL DISTURBANCE: Primary | ICD-10-CM

## 2021-01-01 DIAGNOSIS — R63.4 ABNORMAL WEIGHT LOSS: ICD-10-CM

## 2021-01-01 DIAGNOSIS — G30.1 LATE ONSET ALZHEIMER'S DISEASE WITH BEHAVIORAL DISTURBANCE: ICD-10-CM

## 2021-01-01 DIAGNOSIS — F01.518 MULTI-INFARCT DEMENTIA WITH BEHAVIOR DISTURBANCE: ICD-10-CM

## 2021-01-01 DIAGNOSIS — F01.518 VASCULAR DEMENTIA WITH BEHAVIOR DISTURBANCE: ICD-10-CM

## 2021-01-01 DIAGNOSIS — I63.512 ACUTE ISCHEMIC LEFT MCA STROKE: Primary | ICD-10-CM

## 2021-01-01 DIAGNOSIS — F02.818 LATE ONSET ALZHEIMER'S DISEASE WITH BEHAVIORAL DISTURBANCE: ICD-10-CM

## 2021-01-01 DIAGNOSIS — Z86.73 HISTORY OF STROKE: Primary | ICD-10-CM

## 2021-01-01 DIAGNOSIS — Z86.73 HISTORY OF STROKE: ICD-10-CM

## 2021-01-01 DIAGNOSIS — G47.00 INSOMNIA, UNSPECIFIED TYPE: ICD-10-CM

## 2021-01-01 DIAGNOSIS — I10 ESSENTIAL HYPERTENSION: ICD-10-CM

## 2021-01-01 DIAGNOSIS — G30.1 LATE ONSET ALZHEIMER'S DISEASE WITH BEHAVIORAL DISTURBANCE: Primary | ICD-10-CM

## 2021-01-01 DIAGNOSIS — R82.90 ABNORMAL URINE ODOR: Primary | ICD-10-CM

## 2021-01-01 DIAGNOSIS — I10 ESSENTIAL HYPERTENSION: Primary | ICD-10-CM

## 2021-01-01 DIAGNOSIS — R74.01 TRANSAMINITIS: ICD-10-CM

## 2021-01-01 DIAGNOSIS — E87.6 HYPOKALEMIA: ICD-10-CM

## 2021-01-01 DIAGNOSIS — F41.9 ANXIETY: ICD-10-CM

## 2021-01-01 DIAGNOSIS — I63.512 ACUTE ISCHEMIC LEFT MCA STROKE: ICD-10-CM

## 2021-01-01 LAB
ALBUMIN SERPL BCP-MCNC: 3.8 G/DL (ref 3.5–5.2)
ALP SERPL-CCNC: 49 U/L (ref 55–135)
ALT SERPL W/O P-5'-P-CCNC: 31 U/L (ref 10–44)
ANION GAP SERPL CALC-SCNC: 13 MMOL/L (ref 8–16)
AST SERPL-CCNC: 28 U/L (ref 10–40)
BILIRUB SERPL-MCNC: 0.3 MG/DL (ref 0.1–1)
BUN SERPL-MCNC: 16 MG/DL (ref 8–23)
CALCIUM SERPL-MCNC: 9.1 MG/DL (ref 8.7–10.5)
CHLORIDE SERPL-SCNC: 101 MMOL/L (ref 95–110)
CO2 SERPL-SCNC: 18 MMOL/L (ref 23–29)
CREAT SERPL-MCNC: 0.8 MG/DL (ref 0.5–1.4)
ERYTHROCYTE [DISTWIDTH] IN BLOOD BY AUTOMATED COUNT: 13.6 % (ref 11.5–14.5)
EST. GFR  (AFRICAN AMERICAN): >60 ML/MIN/1.73 M^2
EST. GFR  (NON AFRICAN AMERICAN): >60 ML/MIN/1.73 M^2
GLUCOSE SERPL-MCNC: 98 MG/DL (ref 70–110)
HCT VFR BLD AUTO: 43.5 % (ref 37–48.5)
HGB BLD-MCNC: 13.7 G/DL (ref 12–16)
MCH RBC QN AUTO: 30.7 PG (ref 27–31)
MCHC RBC AUTO-ENTMCNC: 31.5 G/DL (ref 32–36)
MCV RBC AUTO: 98 FL (ref 82–98)
PLATELET # BLD AUTO: 276 K/UL (ref 150–350)
PMV BLD AUTO: 10.5 FL (ref 9.2–12.9)
POTASSIUM SERPL-SCNC: 4.4 MMOL/L (ref 3.5–5.1)
PROT SERPL-MCNC: 7.4 G/DL (ref 6–8.4)
RBC # BLD AUTO: 4.46 M/UL (ref 4–5.4)
SODIUM SERPL-SCNC: 132 MMOL/L (ref 136–145)
WBC # BLD AUTO: 7.34 K/UL (ref 3.9–12.7)

## 2021-01-01 PROCEDURE — 99214 OFFICE O/P EST MOD 30 MIN: CPT | Mod: 95,,, | Performed by: FAMILY MEDICINE

## 2021-01-01 PROCEDURE — 1126F PR PAIN SEVERITY QUANTIFIED, NO PAIN PRESENT: ICD-10-PCS | Mod: S$GLB,,, | Performed by: FAMILY MEDICINE

## 2021-01-01 PROCEDURE — 1126F PR PAIN SEVERITY QUANTIFIED, NO PAIN PRESENT: ICD-10-PCS | Mod: S$GLB,,, | Performed by: PSYCHIATRY & NEUROLOGY

## 2021-01-01 PROCEDURE — 99214 PR OFFICE/OUTPT VISIT, EST, LEVL IV, 30-39 MIN: ICD-10-PCS | Mod: 95,,, | Performed by: FAMILY MEDICINE

## 2021-01-01 PROCEDURE — 1101F PR PT FALLS ASSESS DOC 0-1 FALLS W/OUT INJ PAST YR: ICD-10-PCS | Mod: CPTII,S$GLB,, | Performed by: NURSE PRACTITIONER

## 2021-01-01 PROCEDURE — 85027 COMPLETE CBC AUTOMATED: CPT

## 2021-01-01 PROCEDURE — 99215 OFFICE O/P EST HI 40 MIN: CPT | Mod: S$GLB,,, | Performed by: NURSE PRACTITIONER

## 2021-01-01 PROCEDURE — 99214 OFFICE O/P EST MOD 30 MIN: CPT | Mod: S$GLB,,, | Performed by: FAMILY MEDICINE

## 2021-01-01 PROCEDURE — 1126F AMNT PAIN NOTED NONE PRSNT: CPT | Mod: S$GLB,,, | Performed by: FAMILY MEDICINE

## 2021-01-01 PROCEDURE — 99999 PR PBB SHADOW E&M-EST. PATIENT-LVL IV: CPT | Mod: PBBFAC,,, | Performed by: PSYCHIATRY & NEUROLOGY

## 2021-01-01 PROCEDURE — 99214 PR OFFICE/OUTPT VISIT, EST, LEVL IV, 30-39 MIN: ICD-10-PCS | Mod: S$GLB,,, | Performed by: FAMILY MEDICINE

## 2021-01-01 PROCEDURE — 1157F PR ADVANCE CARE PLAN OR EQUIV PRESENT IN MEDICAL RECORD: ICD-10-PCS | Mod: S$GLB,,, | Performed by: FAMILY MEDICINE

## 2021-01-01 PROCEDURE — 3288F PR FALLS RISK ASSESSMENT DOCUMENTED: ICD-10-PCS | Mod: CPTII,S$GLB,, | Performed by: FAMILY MEDICINE

## 2021-01-01 PROCEDURE — 1159F MED LIST DOCD IN RCRD: CPT | Mod: S$GLB,,, | Performed by: PSYCHIATRY & NEUROLOGY

## 2021-01-01 PROCEDURE — 1159F PR MEDICATION LIST DOCUMENTED IN MEDICAL RECORD: ICD-10-PCS | Mod: S$GLB,,, | Performed by: NURSE PRACTITIONER

## 2021-01-01 PROCEDURE — 1159F MED LIST DOCD IN RCRD: CPT | Mod: S$GLB,,, | Performed by: NURSE PRACTITIONER

## 2021-01-01 PROCEDURE — 1159F MED LIST DOCD IN RCRD: CPT | Mod: S$GLB,,, | Performed by: FAMILY MEDICINE

## 2021-01-01 PROCEDURE — 99999 PR PBB SHADOW E&M-EST. PATIENT-LVL III: CPT | Mod: PBBFAC,,, | Performed by: NURSE PRACTITIONER

## 2021-01-01 PROCEDURE — 1126F AMNT PAIN NOTED NONE PRSNT: CPT | Mod: S$GLB,,, | Performed by: PSYCHIATRY & NEUROLOGY

## 2021-01-01 PROCEDURE — 99999 PR PBB SHADOW E&M-EST. PATIENT-LVL IV: ICD-10-PCS | Mod: PBBFAC,,, | Performed by: FAMILY MEDICINE

## 2021-01-01 PROCEDURE — 1101F PT FALLS ASSESS-DOCD LE1/YR: CPT | Mod: CPTII,S$GLB,, | Performed by: FAMILY MEDICINE

## 2021-01-01 PROCEDURE — 1101F PT FALLS ASSESS-DOCD LE1/YR: CPT | Mod: CPTII,S$GLB,, | Performed by: PSYCHIATRY & NEUROLOGY

## 2021-01-01 PROCEDURE — 80053 COMPREHEN METABOLIC PANEL: CPT

## 2021-01-01 PROCEDURE — 1101F PT FALLS ASSESS-DOCD LE1/YR: CPT | Mod: CPTII,S$GLB,, | Performed by: NURSE PRACTITIONER

## 2021-01-01 PROCEDURE — 99215 PR OFFICE/OUTPT VISIT, EST, LEVL V, 40-54 MIN: ICD-10-PCS | Mod: S$GLB,,, | Performed by: NURSE PRACTITIONER

## 2021-01-01 PROCEDURE — 1157F PR ADVANCE CARE PLAN OR EQUIV PRESENT IN MEDICAL RECORD: ICD-10-PCS | Mod: 95,,, | Performed by: FAMILY MEDICINE

## 2021-01-01 PROCEDURE — 1159F PR MEDICATION LIST DOCUMENTED IN MEDICAL RECORD: ICD-10-PCS | Mod: S$GLB,,, | Performed by: FAMILY MEDICINE

## 2021-01-01 PROCEDURE — 1159F PR MEDICATION LIST DOCUMENTED IN MEDICAL RECORD: ICD-10-PCS | Mod: S$GLB,,, | Performed by: PSYCHIATRY & NEUROLOGY

## 2021-01-01 PROCEDURE — 1101F PR PT FALLS ASSESS DOC 0-1 FALLS W/OUT INJ PAST YR: ICD-10-PCS | Mod: CPTII,S$GLB,, | Performed by: FAMILY MEDICINE

## 2021-01-01 PROCEDURE — 1126F AMNT PAIN NOTED NONE PRSNT: CPT | Mod: S$GLB,,, | Performed by: NURSE PRACTITIONER

## 2021-01-01 PROCEDURE — 99215 OFFICE O/P EST HI 40 MIN: CPT | Mod: S$GLB,,, | Performed by: PSYCHIATRY & NEUROLOGY

## 2021-01-01 PROCEDURE — 1159F MED LIST DOCD IN RCRD: CPT | Mod: 95,,, | Performed by: FAMILY MEDICINE

## 2021-01-01 PROCEDURE — 99999 PR PBB SHADOW E&M-EST. PATIENT-LVL IV: ICD-10-PCS | Mod: PBBFAC,,, | Performed by: PSYCHIATRY & NEUROLOGY

## 2021-01-01 PROCEDURE — 1157F PR ADVANCE CARE PLAN OR EQUIV PRESENT IN MEDICAL RECORD: ICD-10-PCS | Mod: S$GLB,,, | Performed by: PSYCHIATRY & NEUROLOGY

## 2021-01-01 PROCEDURE — 1126F PR PAIN SEVERITY QUANTIFIED, NO PAIN PRESENT: ICD-10-PCS | Mod: S$GLB,,, | Performed by: NURSE PRACTITIONER

## 2021-01-01 PROCEDURE — 1159F PR MEDICATION LIST DOCUMENTED IN MEDICAL RECORD: ICD-10-PCS | Mod: 95,,, | Performed by: FAMILY MEDICINE

## 2021-01-01 PROCEDURE — 1157F ADVNC CARE PLAN IN RCRD: CPT | Mod: 95,,, | Performed by: FAMILY MEDICINE

## 2021-01-01 PROCEDURE — 3288F PR FALLS RISK ASSESSMENT DOCUMENTED: ICD-10-PCS | Mod: CPTII,S$GLB,, | Performed by: NURSE PRACTITIONER

## 2021-01-01 PROCEDURE — 3288F FALL RISK ASSESSMENT DOCD: CPT | Mod: CPTII,S$GLB,, | Performed by: FAMILY MEDICINE

## 2021-01-01 PROCEDURE — 3288F FALL RISK ASSESSMENT DOCD: CPT | Mod: CPTII,S$GLB,, | Performed by: PSYCHIATRY & NEUROLOGY

## 2021-01-01 PROCEDURE — 36415 COLL VENOUS BLD VENIPUNCTURE: CPT | Mod: PN

## 2021-01-01 PROCEDURE — 3288F PR FALLS RISK ASSESSMENT DOCUMENTED: ICD-10-PCS | Mod: CPTII,S$GLB,, | Performed by: PSYCHIATRY & NEUROLOGY

## 2021-01-01 PROCEDURE — 99999 PR PBB SHADOW E&M-EST. PATIENT-LVL III: ICD-10-PCS | Mod: PBBFAC,,, | Performed by: NURSE PRACTITIONER

## 2021-01-01 PROCEDURE — 1101F PR PT FALLS ASSESS DOC 0-1 FALLS W/OUT INJ PAST YR: ICD-10-PCS | Mod: CPTII,S$GLB,, | Performed by: PSYCHIATRY & NEUROLOGY

## 2021-01-01 PROCEDURE — 99999 PR PBB SHADOW E&M-EST. PATIENT-LVL IV: CPT | Mod: PBBFAC,,, | Performed by: FAMILY MEDICINE

## 2021-01-01 PROCEDURE — 1157F ADVNC CARE PLAN IN RCRD: CPT | Mod: S$GLB,,, | Performed by: PSYCHIATRY & NEUROLOGY

## 2021-01-01 PROCEDURE — 99215 PR OFFICE/OUTPT VISIT, EST, LEVL V, 40-54 MIN: ICD-10-PCS | Mod: S$GLB,,, | Performed by: PSYCHIATRY & NEUROLOGY

## 2021-01-01 PROCEDURE — 1157F ADVNC CARE PLAN IN RCRD: CPT | Mod: S$GLB,,, | Performed by: FAMILY MEDICINE

## 2021-01-01 PROCEDURE — 3288F FALL RISK ASSESSMENT DOCD: CPT | Mod: CPTII,S$GLB,, | Performed by: NURSE PRACTITIONER

## 2021-01-01 RX ORDER — LORAZEPAM 0.5 MG/1
TABLET ORAL
Qty: 60 TABLET | Refills: 2 | OUTPATIENT
Start: 2021-01-01

## 2021-01-01 RX ORDER — TRAZODONE HYDROCHLORIDE 50 MG/1
50 TABLET ORAL NIGHTLY
Qty: 30 TABLET | Refills: 11 | Status: SHIPPED | OUTPATIENT
Start: 2021-01-01 | End: 2021-01-01

## 2021-01-01 RX ORDER — SULFAMETHOXAZOLE AND TRIMETHOPRIM 800; 160 MG/1; MG/1
1 TABLET ORAL 2 TIMES DAILY
Qty: 14 TABLET | Refills: 0 | Status: SHIPPED | OUTPATIENT
Start: 2021-01-01 | End: 2021-01-01

## 2021-01-01 RX ORDER — DIAZEPAM 5 MG/1
5 TABLET ORAL DAILY PRN
Qty: 30 TABLET | Refills: 2 | Status: SHIPPED | OUTPATIENT
Start: 2021-01-01 | End: 2021-01-01

## 2021-01-01 RX ORDER — LORAZEPAM 0.5 MG/1
TABLET ORAL
Qty: 60 TABLET | Refills: 0 | Status: SHIPPED | OUTPATIENT
Start: 2021-01-01 | End: 2021-01-01

## 2021-01-01 RX ORDER — DIAZEPAM 5 MG/1
5 TABLET ORAL DAILY PRN
COMMUNITY
Start: 2021-01-01 | End: 2021-01-01 | Stop reason: SDUPTHER

## 2021-01-01 RX ORDER — DONEPEZIL HYDROCHLORIDE 5 MG/1
5 TABLET, FILM COATED ORAL NIGHTLY
Qty: 90 TABLET | Refills: 3 | Status: SHIPPED | OUTPATIENT
Start: 2021-01-01 | End: 2021-01-01

## 2021-01-01 RX ORDER — ATORVASTATIN CALCIUM 40 MG/1
40 TABLET, FILM COATED ORAL NIGHTLY
Qty: 90 TABLET | Refills: 3 | Status: SHIPPED | OUTPATIENT
Start: 2021-01-01 | End: 2022-02-03

## 2021-01-01 RX ORDER — MIRTAZAPINE 15 MG/1
7.5 TABLET, FILM COATED ORAL NIGHTLY
Qty: 15 TABLET | Refills: 11 | Status: SHIPPED | OUTPATIENT
Start: 2021-01-01 | End: 2021-01-01

## 2021-01-01 RX ORDER — DIAZEPAM 5 MG/1
TABLET ORAL
Qty: 90 TABLET | Refills: 0 | Status: SHIPPED | OUTPATIENT
Start: 2021-01-01 | End: 2021-01-01

## 2021-01-01 RX ORDER — SULFAMETHOXAZOLE AND TRIMETHOPRIM 800; 160 MG/1; MG/1
TABLET ORAL
Qty: 14 TABLET | Refills: 0 | Status: SHIPPED | OUTPATIENT
Start: 2021-01-01

## 2021-01-01 RX ORDER — LORAZEPAM 0.5 MG/1
1 TABLET ORAL NIGHTLY
Qty: 60 TABLET | Refills: 0 | Status: SHIPPED | OUTPATIENT
Start: 2021-01-01 | End: 2021-01-01

## 2021-01-01 RX ORDER — LORAZEPAM 0.5 MG/1
TABLET ORAL
Qty: 60 TABLET | Refills: 3 | Status: SHIPPED | OUTPATIENT
Start: 2021-01-01

## 2021-01-05 PROBLEM — U07.1 COVID-19 VIRUS INFECTION: Status: ACTIVE | Noted: 2021-01-01

## 2021-01-05 PROBLEM — Z71.89 ACP (ADVANCE CARE PLANNING): Status: ACTIVE | Noted: 2021-01-01

## 2021-01-05 PROBLEM — R55 COLLAPSE: Status: ACTIVE | Noted: 2021-01-01

## 2021-01-05 PROBLEM — E87.6 HYPOKALEMIA: Status: ACTIVE | Noted: 2021-01-01

## 2021-01-05 PROBLEM — R79.89 ELEVATED TROPONIN: Status: ACTIVE | Noted: 2021-01-01

## 2021-01-06 PROBLEM — I63.512 ACUTE ISCHEMIC LEFT MCA STROKE: Status: ACTIVE | Noted: 2021-01-01

## 2021-01-06 PROBLEM — I48.0 PAROXYSMAL A-FIB: Status: ACTIVE | Noted: 2021-01-01

## 2021-01-06 PROBLEM — G30.1 LATE ONSET ALZHEIMER'S DISEASE WITH BEHAVIORAL DISTURBANCE: Status: ACTIVE | Noted: 2021-01-01

## 2021-01-06 PROBLEM — F02.818 LATE ONSET ALZHEIMER'S DISEASE WITH BEHAVIORAL DISTURBANCE: Status: ACTIVE | Noted: 2021-01-01

## 2021-01-07 PROBLEM — I48.0 PAROXYSMAL A-FIB: Status: RESOLVED | Noted: 2021-01-01 | Resolved: 2021-01-01

## 2021-02-21 NOTE — TELEPHONE ENCOUNTER
----- Message from Dinorah Montero sent at 10/22/2018  2:09 PM CDT -----  Type:  Pharmacy Calling to Clarify an RX    Name of Caller:  dawn  Pharmacy Name:    [facility] [id number] [address] [address]  Phone: [phone] Fax: [phone]      Prescription Name: cream  For a  Rash    Best Call Back Number: [phone]        impairments found

## 2021-04-15 PROBLEM — G47.00 INSOMNIA: Status: ACTIVE | Noted: 2021-01-01

## 2021-04-15 PROBLEM — Z86.73 HISTORY OF STROKE: Status: ACTIVE | Noted: 2021-01-01
